# Patient Record
Sex: MALE | Race: BLACK OR AFRICAN AMERICAN | URBAN - METROPOLITAN AREA
[De-identification: names, ages, dates, MRNs, and addresses within clinical notes are randomized per-mention and may not be internally consistent; named-entity substitution may affect disease eponyms.]

---

## 2017-10-09 ENCOUNTER — EMERGENCY (EMERGENCY)
Facility: HOSPITAL | Age: 72
LOS: 1 days | End: 2017-10-09
Payer: MEDICARE

## 2017-10-09 PROCEDURE — 99284 EMERGENCY DEPT VISIT MOD MDM: CPT

## 2018-11-11 ENCOUNTER — EMERGENCY (EMERGENCY)
Facility: HOSPITAL | Age: 73
LOS: 1 days | End: 2018-11-11
Payer: MEDICARE

## 2018-11-11 PROCEDURE — 99284 EMERGENCY DEPT VISIT MOD MDM: CPT

## 2018-11-11 PROCEDURE — 72125 CT NECK SPINE W/O DYE: CPT | Mod: 26

## 2018-11-11 PROCEDURE — 71045 X-RAY EXAM CHEST 1 VIEW: CPT | Mod: 26

## 2018-11-11 PROCEDURE — 70450 CT HEAD/BRAIN W/O DYE: CPT | Mod: 26

## 2018-11-11 PROCEDURE — 72170 X-RAY EXAM OF PELVIS: CPT | Mod: 26

## 2019-08-10 ENCOUNTER — EMERGENCY (EMERGENCY)
Facility: HOSPITAL | Age: 74
LOS: 1 days | End: 2019-08-10
Admitting: EMERGENCY MEDICINE
Payer: MEDICARE

## 2019-08-10 PROCEDURE — 99283 EMERGENCY DEPT VISIT LOW MDM: CPT

## 2019-10-29 ENCOUNTER — EMERGENCY (EMERGENCY)
Facility: HOSPITAL | Age: 74
LOS: 1 days | End: 2019-10-29
Admitting: EMERGENCY MEDICINE
Payer: MEDICARE

## 2019-10-29 PROCEDURE — 99284 EMERGENCY DEPT VISIT MOD MDM: CPT

## 2019-10-29 PROCEDURE — 93010 ELECTROCARDIOGRAM REPORT: CPT

## 2020-01-05 ENCOUNTER — EMERGENCY (EMERGENCY)
Facility: HOSPITAL | Age: 75
LOS: 1 days | End: 2020-01-05
Admitting: EMERGENCY MEDICINE
Payer: MEDICARE

## 2020-01-05 PROCEDURE — 70450 CT HEAD/BRAIN W/O DYE: CPT | Mod: 26

## 2020-01-05 PROCEDURE — 99284 EMERGENCY DEPT VISIT MOD MDM: CPT

## 2020-01-24 ENCOUNTER — OUTPATIENT (OUTPATIENT)
Dept: OUTPATIENT SERVICES | Facility: HOSPITAL | Age: 75
LOS: 1 days | End: 2020-01-24

## 2020-01-24 ENCOUNTER — INPATIENT (INPATIENT)
Facility: HOSPITAL | Age: 75
LOS: 6 days | Discharge: HOME | End: 2020-01-31
Payer: MEDICARE

## 2020-01-24 PROCEDURE — 99285 EMERGENCY DEPT VISIT HI MDM: CPT

## 2020-01-24 PROCEDURE — 70450 CT HEAD/BRAIN W/O DYE: CPT | Mod: 26

## 2020-01-24 PROCEDURE — 71046 X-RAY EXAM CHEST 2 VIEWS: CPT | Mod: 26

## 2020-01-25 ENCOUNTER — OUTPATIENT (OUTPATIENT)
Dept: OUTPATIENT SERVICES | Facility: HOSPITAL | Age: 75
LOS: 1 days | End: 2020-01-25

## 2020-01-25 PROCEDURE — 76770 US EXAM ABDO BACK WALL COMP: CPT | Mod: 26

## 2020-01-25 PROCEDURE — 71250 CT THORAX DX C-: CPT | Mod: 26

## 2020-01-26 ENCOUNTER — OUTPATIENT (OUTPATIENT)
Dept: OUTPATIENT SERVICES | Facility: HOSPITAL | Age: 75
LOS: 1 days | End: 2020-01-26

## 2020-01-27 ENCOUNTER — OUTPATIENT (OUTPATIENT)
Dept: OUTPATIENT SERVICES | Facility: HOSPITAL | Age: 75
LOS: 1 days | End: 2020-01-27

## 2020-01-28 ENCOUNTER — OUTPATIENT (OUTPATIENT)
Dept: OUTPATIENT SERVICES | Facility: HOSPITAL | Age: 75
LOS: 1 days | End: 2020-01-28

## 2020-01-29 ENCOUNTER — OUTPATIENT (OUTPATIENT)
Dept: OUTPATIENT SERVICES | Facility: HOSPITAL | Age: 75
LOS: 1 days | End: 2020-01-29

## 2020-01-30 ENCOUNTER — OUTPATIENT (OUTPATIENT)
Dept: OUTPATIENT SERVICES | Facility: HOSPITAL | Age: 75
LOS: 1 days | End: 2020-01-30

## 2020-01-31 ENCOUNTER — OUTPATIENT (OUTPATIENT)
Dept: OUTPATIENT SERVICES | Facility: HOSPITAL | Age: 75
LOS: 1 days | End: 2020-01-31

## 2023-12-19 ENCOUNTER — INPATIENT (INPATIENT)
Facility: HOSPITAL | Age: 78
LOS: 8 days | Discharge: NURSING, REHAB OR LONG-TERM CARE | DRG: 682 | End: 2023-12-28
Attending: INTERNAL MEDICINE | Admitting: INTERNAL MEDICINE
Payer: MEDICARE

## 2023-12-19 VITALS
WEIGHT: 179.9 LBS | HEART RATE: 82 BPM | OXYGEN SATURATION: 100 % | HEIGHT: 69 IN | RESPIRATION RATE: 18 BRPM | SYSTOLIC BLOOD PRESSURE: 192 MMHG | DIASTOLIC BLOOD PRESSURE: 70 MMHG

## 2023-12-19 DIAGNOSIS — F20.9 SCHIZOPHRENIA, UNSPECIFIED: ICD-10-CM

## 2023-12-19 DIAGNOSIS — I10 ESSENTIAL (PRIMARY) HYPERTENSION: ICD-10-CM

## 2023-12-19 DIAGNOSIS — N19 UNSPECIFIED KIDNEY FAILURE: ICD-10-CM

## 2023-12-19 DIAGNOSIS — D64.9 ANEMIA, UNSPECIFIED: ICD-10-CM

## 2023-12-19 LAB
ALBUMIN SERPL ELPH-MCNC: 4.1 G/DL — SIGNIFICANT CHANGE UP (ref 3.3–5)
ALBUMIN SERPL ELPH-MCNC: 4.1 G/DL — SIGNIFICANT CHANGE UP (ref 3.3–5)
ALP SERPL-CCNC: 96 U/L — SIGNIFICANT CHANGE UP (ref 40–120)
ALP SERPL-CCNC: 96 U/L — SIGNIFICANT CHANGE UP (ref 40–120)
ALT FLD-CCNC: 11 U/L — SIGNIFICANT CHANGE UP (ref 10–45)
ALT FLD-CCNC: 11 U/L — SIGNIFICANT CHANGE UP (ref 10–45)
AMPHET UR-MCNC: NEGATIVE — SIGNIFICANT CHANGE UP
AMPHET UR-MCNC: NEGATIVE — SIGNIFICANT CHANGE UP
ANION GAP SERPL CALC-SCNC: 12 MMOL/L — SIGNIFICANT CHANGE UP (ref 5–17)
ANION GAP SERPL CALC-SCNC: 12 MMOL/L — SIGNIFICANT CHANGE UP (ref 5–17)
APAP SERPL-MCNC: <15 UG/ML — SIGNIFICANT CHANGE UP (ref 10–30)
APAP SERPL-MCNC: <15 UG/ML — SIGNIFICANT CHANGE UP (ref 10–30)
APPEARANCE UR: CLEAR — SIGNIFICANT CHANGE UP
APPEARANCE UR: CLEAR — SIGNIFICANT CHANGE UP
AST SERPL-CCNC: 21 U/L — SIGNIFICANT CHANGE UP (ref 10–40)
AST SERPL-CCNC: 21 U/L — SIGNIFICANT CHANGE UP (ref 10–40)
BACTERIA # UR AUTO: NEGATIVE /HPF — SIGNIFICANT CHANGE UP
BACTERIA # UR AUTO: NEGATIVE /HPF — SIGNIFICANT CHANGE UP
BARBITURATES UR SCN-MCNC: NEGATIVE — SIGNIFICANT CHANGE UP
BARBITURATES UR SCN-MCNC: NEGATIVE — SIGNIFICANT CHANGE UP
BASOPHILS # BLD AUTO: 0.02 K/UL — SIGNIFICANT CHANGE UP (ref 0–0.2)
BASOPHILS # BLD AUTO: 0.02 K/UL — SIGNIFICANT CHANGE UP (ref 0–0.2)
BASOPHILS NFR BLD AUTO: 0.5 % — SIGNIFICANT CHANGE UP (ref 0–2)
BASOPHILS NFR BLD AUTO: 0.5 % — SIGNIFICANT CHANGE UP (ref 0–2)
BENZODIAZ UR-MCNC: NEGATIVE — SIGNIFICANT CHANGE UP
BENZODIAZ UR-MCNC: NEGATIVE — SIGNIFICANT CHANGE UP
BILIRUB SERPL-MCNC: 0.2 MG/DL — SIGNIFICANT CHANGE UP (ref 0.2–1.2)
BILIRUB SERPL-MCNC: 0.2 MG/DL — SIGNIFICANT CHANGE UP (ref 0.2–1.2)
BILIRUB UR-MCNC: NEGATIVE — SIGNIFICANT CHANGE UP
BILIRUB UR-MCNC: NEGATIVE — SIGNIFICANT CHANGE UP
BUN SERPL-MCNC: 75 MG/DL — HIGH (ref 7–23)
BUN SERPL-MCNC: 75 MG/DL — HIGH (ref 7–23)
CALCIUM SERPL-MCNC: 8.8 MG/DL — SIGNIFICANT CHANGE UP (ref 8.4–10.5)
CALCIUM SERPL-MCNC: 8.8 MG/DL — SIGNIFICANT CHANGE UP (ref 8.4–10.5)
CAST: 0 /LPF — SIGNIFICANT CHANGE UP (ref 0–4)
CAST: 0 /LPF — SIGNIFICANT CHANGE UP (ref 0–4)
CHLORIDE SERPL-SCNC: 105 MMOL/L — SIGNIFICANT CHANGE UP (ref 96–108)
CHLORIDE SERPL-SCNC: 105 MMOL/L — SIGNIFICANT CHANGE UP (ref 96–108)
CO2 SERPL-SCNC: 22 MMOL/L — SIGNIFICANT CHANGE UP (ref 22–31)
CO2 SERPL-SCNC: 22 MMOL/L — SIGNIFICANT CHANGE UP (ref 22–31)
COCAINE METAB.OTHER UR-MCNC: NEGATIVE — SIGNIFICANT CHANGE UP
COCAINE METAB.OTHER UR-MCNC: NEGATIVE — SIGNIFICANT CHANGE UP
COLOR SPEC: YELLOW — SIGNIFICANT CHANGE UP
COLOR SPEC: YELLOW — SIGNIFICANT CHANGE UP
CREAT SERPL-MCNC: 4.46 MG/DL — HIGH (ref 0.5–1.3)
CREAT SERPL-MCNC: 4.46 MG/DL — HIGH (ref 0.5–1.3)
DIFF PNL FLD: ABNORMAL
DIFF PNL FLD: ABNORMAL
EGFR: 13 ML/MIN/1.73M2 — LOW
EGFR: 13 ML/MIN/1.73M2 — LOW
EOSINOPHIL # BLD AUTO: 0.04 K/UL — SIGNIFICANT CHANGE UP (ref 0–0.5)
EOSINOPHIL # BLD AUTO: 0.04 K/UL — SIGNIFICANT CHANGE UP (ref 0–0.5)
EOSINOPHIL NFR BLD AUTO: 1 % — SIGNIFICANT CHANGE UP (ref 0–6)
EOSINOPHIL NFR BLD AUTO: 1 % — SIGNIFICANT CHANGE UP (ref 0–6)
ETHANOL SERPL-MCNC: <10 MG/DL — SIGNIFICANT CHANGE UP (ref 0–10)
ETHANOL SERPL-MCNC: <10 MG/DL — SIGNIFICANT CHANGE UP (ref 0–10)
GLUCOSE BLDC GLUCOMTR-MCNC: 167 MG/DL — HIGH (ref 70–99)
GLUCOSE BLDC GLUCOMTR-MCNC: 167 MG/DL — HIGH (ref 70–99)
GLUCOSE SERPL-MCNC: 59 MG/DL — LOW (ref 70–99)
GLUCOSE SERPL-MCNC: 59 MG/DL — LOW (ref 70–99)
GLUCOSE UR QL: NEGATIVE MG/DL — SIGNIFICANT CHANGE UP
GLUCOSE UR QL: NEGATIVE MG/DL — SIGNIFICANT CHANGE UP
HCT VFR BLD CALC: 29.4 % — LOW (ref 39–50)
HCT VFR BLD CALC: 29.4 % — LOW (ref 39–50)
HGB BLD-MCNC: 9.6 G/DL — LOW (ref 13–17)
HGB BLD-MCNC: 9.6 G/DL — LOW (ref 13–17)
IMM GRANULOCYTES NFR BLD AUTO: 0.5 % — SIGNIFICANT CHANGE UP (ref 0–0.9)
IMM GRANULOCYTES NFR BLD AUTO: 0.5 % — SIGNIFICANT CHANGE UP (ref 0–0.9)
KETONES UR-MCNC: NEGATIVE MG/DL — SIGNIFICANT CHANGE UP
KETONES UR-MCNC: NEGATIVE MG/DL — SIGNIFICANT CHANGE UP
LEUKOCYTE ESTERASE UR-ACNC: NEGATIVE — SIGNIFICANT CHANGE UP
LEUKOCYTE ESTERASE UR-ACNC: NEGATIVE — SIGNIFICANT CHANGE UP
LYMPHOCYTES # BLD AUTO: 1.17 K/UL — SIGNIFICANT CHANGE UP (ref 1–3.3)
LYMPHOCYTES # BLD AUTO: 1.17 K/UL — SIGNIFICANT CHANGE UP (ref 1–3.3)
LYMPHOCYTES # BLD AUTO: 28.6 % — SIGNIFICANT CHANGE UP (ref 13–44)
LYMPHOCYTES # BLD AUTO: 28.6 % — SIGNIFICANT CHANGE UP (ref 13–44)
MCHC RBC-ENTMCNC: 28.9 PG — SIGNIFICANT CHANGE UP (ref 27–34)
MCHC RBC-ENTMCNC: 28.9 PG — SIGNIFICANT CHANGE UP (ref 27–34)
MCHC RBC-ENTMCNC: 32.7 GM/DL — SIGNIFICANT CHANGE UP (ref 32–36)
MCHC RBC-ENTMCNC: 32.7 GM/DL — SIGNIFICANT CHANGE UP (ref 32–36)
MCV RBC AUTO: 88.6 FL — SIGNIFICANT CHANGE UP (ref 80–100)
MCV RBC AUTO: 88.6 FL — SIGNIFICANT CHANGE UP (ref 80–100)
METHADONE UR-MCNC: NEGATIVE — SIGNIFICANT CHANGE UP
METHADONE UR-MCNC: NEGATIVE — SIGNIFICANT CHANGE UP
MONOCYTES # BLD AUTO: 0.42 K/UL — SIGNIFICANT CHANGE UP (ref 0–0.9)
MONOCYTES # BLD AUTO: 0.42 K/UL — SIGNIFICANT CHANGE UP (ref 0–0.9)
MONOCYTES NFR BLD AUTO: 10.3 % — SIGNIFICANT CHANGE UP (ref 2–14)
MONOCYTES NFR BLD AUTO: 10.3 % — SIGNIFICANT CHANGE UP (ref 2–14)
NEUTROPHILS # BLD AUTO: 2.42 K/UL — SIGNIFICANT CHANGE UP (ref 1.8–7.4)
NEUTROPHILS # BLD AUTO: 2.42 K/UL — SIGNIFICANT CHANGE UP (ref 1.8–7.4)
NEUTROPHILS NFR BLD AUTO: 59.1 % — SIGNIFICANT CHANGE UP (ref 43–77)
NEUTROPHILS NFR BLD AUTO: 59.1 % — SIGNIFICANT CHANGE UP (ref 43–77)
NITRITE UR-MCNC: NEGATIVE — SIGNIFICANT CHANGE UP
NITRITE UR-MCNC: NEGATIVE — SIGNIFICANT CHANGE UP
NRBC # BLD: 0 /100 WBCS — SIGNIFICANT CHANGE UP (ref 0–0)
NRBC # BLD: 0 /100 WBCS — SIGNIFICANT CHANGE UP (ref 0–0)
OPIATES UR-MCNC: NEGATIVE — SIGNIFICANT CHANGE UP
OPIATES UR-MCNC: NEGATIVE — SIGNIFICANT CHANGE UP
OXYCODONE UR-MCNC: NEGATIVE — SIGNIFICANT CHANGE UP
OXYCODONE UR-MCNC: NEGATIVE — SIGNIFICANT CHANGE UP
PCP SPEC-MCNC: SIGNIFICANT CHANGE UP
PCP SPEC-MCNC: SIGNIFICANT CHANGE UP
PCP UR-MCNC: NEGATIVE — SIGNIFICANT CHANGE UP
PCP UR-MCNC: NEGATIVE — SIGNIFICANT CHANGE UP
PH UR: 6.5 — SIGNIFICANT CHANGE UP (ref 5–8)
PH UR: 6.5 — SIGNIFICANT CHANGE UP (ref 5–8)
PLATELET # BLD AUTO: 159 K/UL — SIGNIFICANT CHANGE UP (ref 150–400)
PLATELET # BLD AUTO: 159 K/UL — SIGNIFICANT CHANGE UP (ref 150–400)
POTASSIUM SERPL-MCNC: 4.4 MMOL/L — SIGNIFICANT CHANGE UP (ref 3.5–5.3)
POTASSIUM SERPL-MCNC: 4.4 MMOL/L — SIGNIFICANT CHANGE UP (ref 3.5–5.3)
POTASSIUM SERPL-SCNC: 4.4 MMOL/L — SIGNIFICANT CHANGE UP (ref 3.5–5.3)
POTASSIUM SERPL-SCNC: 4.4 MMOL/L — SIGNIFICANT CHANGE UP (ref 3.5–5.3)
PROT SERPL-MCNC: 7.2 G/DL — SIGNIFICANT CHANGE UP (ref 6–8.3)
PROT SERPL-MCNC: 7.2 G/DL — SIGNIFICANT CHANGE UP (ref 6–8.3)
PROT UR-MCNC: 30 MG/DL
PROT UR-MCNC: 30 MG/DL
RBC # BLD: 3.32 M/UL — LOW (ref 4.2–5.8)
RBC # BLD: 3.32 M/UL — LOW (ref 4.2–5.8)
RBC # FLD: 14.5 % — SIGNIFICANT CHANGE UP (ref 10.3–14.5)
RBC # FLD: 14.5 % — SIGNIFICANT CHANGE UP (ref 10.3–14.5)
RBC CASTS # UR COMP ASSIST: 0 /HPF — SIGNIFICANT CHANGE UP (ref 0–4)
RBC CASTS # UR COMP ASSIST: 0 /HPF — SIGNIFICANT CHANGE UP (ref 0–4)
SALICYLATES SERPL-MCNC: <2 MG/DL — LOW (ref 15–30)
SALICYLATES SERPL-MCNC: <2 MG/DL — LOW (ref 15–30)
SODIUM SERPL-SCNC: 139 MMOL/L — SIGNIFICANT CHANGE UP (ref 135–145)
SODIUM SERPL-SCNC: 139 MMOL/L — SIGNIFICANT CHANGE UP (ref 135–145)
SP GR SPEC: 1.01 — SIGNIFICANT CHANGE UP (ref 1–1.03)
SP GR SPEC: 1.01 — SIGNIFICANT CHANGE UP (ref 1–1.03)
SQUAMOUS # UR AUTO: 0 /HPF — SIGNIFICANT CHANGE UP (ref 0–5)
SQUAMOUS # UR AUTO: 0 /HPF — SIGNIFICANT CHANGE UP (ref 0–5)
THC UR QL: NEGATIVE — SIGNIFICANT CHANGE UP
THC UR QL: NEGATIVE — SIGNIFICANT CHANGE UP
UROBILINOGEN FLD QL: 0.2 MG/DL — SIGNIFICANT CHANGE UP (ref 0.2–1)
UROBILINOGEN FLD QL: 0.2 MG/DL — SIGNIFICANT CHANGE UP (ref 0.2–1)
VALPROATE SERPL-MCNC: 39 UG/ML — LOW (ref 50–100)
VALPROATE SERPL-MCNC: 39 UG/ML — LOW (ref 50–100)
WBC # BLD: 4.09 K/UL — SIGNIFICANT CHANGE UP (ref 3.8–10.5)
WBC # BLD: 4.09 K/UL — SIGNIFICANT CHANGE UP (ref 3.8–10.5)
WBC # FLD AUTO: 4.09 K/UL — SIGNIFICANT CHANGE UP (ref 3.8–10.5)
WBC # FLD AUTO: 4.09 K/UL — SIGNIFICANT CHANGE UP (ref 3.8–10.5)
WBC UR QL: 1 /HPF — SIGNIFICANT CHANGE UP (ref 0–5)
WBC UR QL: 1 /HPF — SIGNIFICANT CHANGE UP (ref 0–5)

## 2023-12-19 PROCEDURE — 99222 1ST HOSP IP/OBS MODERATE 55: CPT

## 2023-12-19 PROCEDURE — 99285 EMERGENCY DEPT VISIT HI MDM: CPT

## 2023-12-19 PROCEDURE — 76770 US EXAM ABDO BACK WALL COMP: CPT | Mod: 26

## 2023-12-19 RX ORDER — DEXTROSE 50 % IN WATER 50 %
12.5 SYRINGE (ML) INTRAVENOUS ONCE
Refills: 0 | Status: DISCONTINUED | OUTPATIENT
Start: 2023-12-19 | End: 2023-12-28

## 2023-12-19 RX ORDER — HYDRALAZINE HCL 50 MG
25 TABLET ORAL EVERY 8 HOURS
Refills: 0 | Status: DISCONTINUED | OUTPATIENT
Start: 2023-12-19 | End: 2023-12-28

## 2023-12-19 RX ORDER — ISOSORBIDE MONONITRATE 60 MG/1
30 TABLET, EXTENDED RELEASE ORAL DAILY
Refills: 0 | Status: DISCONTINUED | OUTPATIENT
Start: 2023-12-19 | End: 2023-12-28

## 2023-12-19 RX ORDER — GLUCAGON INJECTION, SOLUTION 0.5 MG/.1ML
1 INJECTION, SOLUTION SUBCUTANEOUS ONCE
Refills: 0 | Status: DISCONTINUED | OUTPATIENT
Start: 2023-12-19 | End: 2023-12-28

## 2023-12-19 RX ORDER — SODIUM CHLORIDE 9 MG/ML
1000 INJECTION, SOLUTION INTRAVENOUS
Refills: 0 | Status: DISCONTINUED | OUTPATIENT
Start: 2023-12-19 | End: 2023-12-28

## 2023-12-19 RX ORDER — INSULIN LISPRO 100/ML
VIAL (ML) SUBCUTANEOUS
Refills: 0 | Status: DISCONTINUED | OUTPATIENT
Start: 2023-12-19 | End: 2023-12-28

## 2023-12-19 RX ORDER — TRAZODONE HCL 50 MG
100 TABLET ORAL AT BEDTIME
Refills: 0 | Status: DISCONTINUED | OUTPATIENT
Start: 2023-12-19 | End: 2023-12-28

## 2023-12-19 RX ORDER — INSULIN GLARGINE 100 [IU]/ML
8 INJECTION, SOLUTION SUBCUTANEOUS AT BEDTIME
Refills: 0 | Status: DISCONTINUED | OUTPATIENT
Start: 2023-12-19 | End: 2023-12-28

## 2023-12-19 RX ORDER — HYDRALAZINE HCL 50 MG
25 TABLET ORAL ONCE
Refills: 0 | Status: COMPLETED | OUTPATIENT
Start: 2023-12-19 | End: 2023-12-19

## 2023-12-19 RX ORDER — DEXTROSE 50 % IN WATER 50 %
25 SYRINGE (ML) INTRAVENOUS ONCE
Refills: 0 | Status: DISCONTINUED | OUTPATIENT
Start: 2023-12-19 | End: 2023-12-28

## 2023-12-19 RX ORDER — CLOPIDOGREL BISULFATE 75 MG/1
75 TABLET, FILM COATED ORAL DAILY
Refills: 0 | Status: DISCONTINUED | OUTPATIENT
Start: 2023-12-19 | End: 2023-12-28

## 2023-12-19 RX ORDER — TAMSULOSIN HYDROCHLORIDE 0.4 MG/1
0.4 CAPSULE ORAL AT BEDTIME
Refills: 0 | Status: DISCONTINUED | OUTPATIENT
Start: 2023-12-19 | End: 2023-12-28

## 2023-12-19 RX ORDER — FINASTERIDE 5 MG/1
5 TABLET, FILM COATED ORAL DAILY
Refills: 0 | Status: DISCONTINUED | OUTPATIENT
Start: 2023-12-19 | End: 2023-12-28

## 2023-12-19 RX ORDER — METOPROLOL TARTRATE 50 MG
100 TABLET ORAL DAILY
Refills: 0 | Status: DISCONTINUED | OUTPATIENT
Start: 2023-12-19 | End: 2023-12-28

## 2023-12-19 RX ORDER — DIVALPROEX SODIUM 500 MG/1
500 TABLET, DELAYED RELEASE ORAL
Refills: 0 | Status: DISCONTINUED | OUTPATIENT
Start: 2023-12-19 | End: 2023-12-28

## 2023-12-19 RX ORDER — DEXTROSE 50 % IN WATER 50 %
15 SYRINGE (ML) INTRAVENOUS ONCE
Refills: 0 | Status: DISCONTINUED | OUTPATIENT
Start: 2023-12-19 | End: 2023-12-28

## 2023-12-19 RX ADMIN — TAMSULOSIN HYDROCHLORIDE 0.4 MILLIGRAM(S): 0.4 CAPSULE ORAL at 22:18

## 2023-12-19 RX ADMIN — INSULIN GLARGINE 8 UNIT(S): 100 INJECTION, SOLUTION SUBCUTANEOUS at 22:17

## 2023-12-19 RX ADMIN — Medication 1: at 22:17

## 2023-12-19 RX ADMIN — Medication 100 MILLIGRAM(S): at 22:18

## 2023-12-19 RX ADMIN — Medication 25 MILLIGRAM(S): at 20:17

## 2023-12-19 RX ADMIN — Medication 1 MILLIGRAM(S): at 18:46

## 2023-12-19 RX ADMIN — Medication 25 MILLIGRAM(S): at 22:18

## 2023-12-19 NOTE — ED PROVIDER NOTE - CLINICAL SUMMARY MEDICAL DECISION MAKING FREE TEXT BOX
elderly male with prior psych history presents due to being an apparent threat to others. denies somatic complaints or recent trauma. no focal neuro deficit. VS with htn, otherwise wnl on arrival. screening ecg for QTC, lytes, tsh, drug screen, psych cs.

## 2023-12-19 NOTE — CONSULT NOTE ADULT - ATTENDING COMMENTS
Patient seen and evaluated this morning.  patient not cooperative with examination offers no history to me.  spoke with primary team who got collateral sounds like patient has known advanced renal dysfunction and was being worked up for dialysis.  at this moment there are no emergency indications for HD will monitor - continue to gather collateral and goals of care.
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
## 2023-12-19 NOTE — CONSULT NOTE ADULT - PROBLEM SELECTOR RECOMMENDATION 2
Pt with -190s in the ED. Pt on Hydralazine 25mg TID, Imdur 30mg QD, and Metoprolol tartrate 100mg QD. Recommend resuming home BP medications. Titrate medications for goal SBP <140.

## 2023-12-19 NOTE — H&P ADULT - ASSESSMENT
78 male h/o schizophrenia, suspected ESRD on HD, sent in for agitation and combativeness    schizophrenia  psy audra noted  resume home meds    suspected esrd on hd  renal consult f/u  pt has reportedly been refusing HD  unable to verify history as pt confused, and no one available to provide info from the nursing home  will need to clarify in am    htn  resume home meds    anemia  likely chronic  monitor    will need to clarify home meds             Advanced care planning was discussed with patient and family.  Advanced care planning forms were reviewed and discussed as appropriate.  Differential diagnosis and plan of care discussed with patient after the evaluation.   Pain assessed and judicious use of narcotics when appropriate was discussed.  Importance of Fall prevention discussed.  Counseling on Smoking and Alcohol cessation was offered when appropriate.  Counseling on Diet, exercise, and medication compliance was done.       Approx 75 minutes spent.
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
## 2023-12-19 NOTE — ED ADULT NURSE REASSESSMENT NOTE - DESCRIPTION
pt received ativan 1 mg po at 1846H for agitation pt received ativan 1 mg po at 1846H for agitation, Pt is being admitted to medicine
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
## 2023-12-19 NOTE — ED PROVIDER NOTE - ATTENDING CONTRIBUTION TO CARE
I, Kamran Milan, have performed a history and physical exam on this patient, and discussed their management with the resident. I have fully participated in the care of this patient. I agree with the history, physical exam, and plan as documented by the resident

## 2023-12-19 NOTE — ED ADULT NURSE REASSESSMENT NOTE - NS ED NURSE REASSESS COMMENT FT1
ACP Chambers contacted about pt elevated BP. Pt asymptomatic at this time. Pt pending med orders at this time.

## 2023-12-19 NOTE — CONSULT NOTE ADULT - PROBLEM SELECTOR RECOMMENDATION 9
Pt with HAYLEY in the setting of uncontrolled hypertension and BPH. On review of labs on [place], no prior labs available. Admission Scr was 4.46 with eGFR of 13 on 12/19/23. Attempted but unable to reach sister Enedina Keyes for further history ([phone]). Length of HAYLEY unknown. Pt with B/L LE edema on exam. Pt on finasteride and tamsulosin. Pt clinically stable. UA with 30mg of protein, SG 1.009, and trace blood. No UOP documented but urinating frequently in ED. Urine drug screen negative. Recommend checking urine lytes, UPCR, BNP, and to obtain kidney US. Bladder scan to evaluate for urinary retention. Recommend IV Lasix 40mg x1 if no evidence of urinary retention. If pt has urinary retention place houser catheter. Place on renal diet. Will assess HD needs daily. No HD at this time. Monitor labs and urine output. Avoid nephrotoxins. Dose medications as per eGFR. Pt with HAYLEY in the setting of uncontrolled hypertension and BPH. On review of labs on [place], no prior labs available. Admission Scr was 4.46 with eGFR of 13 on 12/19/23. Attempted but unable to reach sister Enedina Keyes for further history ([phone]). Length of HAYLEY unknown. Pt with B/L LE edema on exam. Pt on finasteride and tamsulosin. Pt clinically stable. UA with 30mg of protein, SG 1.009, and trace blood. No UOP documented but urinating frequently in ED. Urine drug screen negative. Recommend checking urine lytes, UPCR, BNP, and to obtain kidney US. Bladder scan to evaluate for urinary retention. Recommend IV Lasix 40mg x1 if no evidence of urinary retention. If pt has urinary retention place houser catheter. Place on renal diet. Will assess HD needs daily. No HD at this time. Monitor labs and urine output. Avoid nephrotoxins. Dose medications as per eGFR.

## 2023-12-19 NOTE — ED ADULT NURSE NOTE - NSFALLHARMRISKINTERV_ED_ALL_ED
Assistance OOB with selected safe patient handling equipment if applicable/Communicate risk of Fall with Harm to all staff, patient, and family/Monitor gait and stability/Provide patient with walking aids/Provide visual cue: red socks, yellow wristband, yellow gown, etc/Reinforce activity limits and safety measures with patient and family/Bed in lowest position, wheels locked, appropriate side rails in place/Call bell, personal items and telephone in reach/Instruct patient to call for assistance before getting out of bed/chair/stretcher/Non-slip footwear applied when patient is off stretcher/[place] to call system/Physically safe environment - no spills, clutter or unnecessary equipment/Purposeful Proactive Rounding/Room/bathroom lighting operational, light cord in reach Assistance OOB with selected safe patient handling equipment if applicable/Communicate risk of Fall with Harm to all staff, patient, and family/Monitor gait and stability/Provide patient with walking aids/Provide visual cue: red socks, yellow wristband, yellow gown, etc/Reinforce activity limits and safety measures with patient and family/Bed in lowest position, wheels locked, appropriate side rails in place/Call bell, personal items and telephone in reach/Instruct patient to call for assistance before getting out of bed/chair/stretcher/Non-slip footwear applied when patient is off stretcher/[place] to call system/Physically safe environment - no spills, clutter or unnecessary equipment/Purposeful Proactive Rounding/Room/bathroom lighting operational, light cord in reach

## 2023-12-19 NOTE — ED PROVIDER NOTE - PHYSICAL EXAMINATION
General: non-toxic, NAD  HEENT: NCAT, PERRL  Cardiac: RRR, no murmurs, 2+ peripheral pulses  Resp: CTAB  Extremities: no peripheral edema, calf tenderness, or leg size discrepancies  Skin: no visible rashes  Neuro: AAOx4, 5+motor, sensation grossly intact  Psych: speaks loudly. mildly agitated intermittently refusing blood work. no tangential speech. mildly pressured speech.

## 2023-12-19 NOTE — ED BEHAVIORAL HEALTH ASSESSMENT NOTE - CURRENT PASSIVE IDEATION:
- Pain well controlled, continue current pain regimen  - Increase ambulation, SCDs when not ambulating  - Continue regular diet  - AM H/H    Robyn Frankel PGY-1 None known
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
## 2023-12-19 NOTE — CHART NOTE - NSCHARTNOTEFT_GEN_A_CORE
Emergency Room : Patient is a 78 year old single  male presented to the ED for evaluation for aggressive behavior. Patient is wearing 5 hats on his head and speaking loudly.  Per chart review patient has history of dementia and Schizoaffective Disorder. Patient is resident of [facility].  LMSW introduced herself to the patient. Patient speaks loudly.  He informed he has been at the nursing home for years.  When asked about the aggressive behavior towards other residents and staff members patient denied. Presently patient is calmed and socializing with staff appropriately.    MARYSW contacted the facility staff at [facility] ([phone]) for collateral information.  Nursing Supervisor, Jennifer the patient was sent to the ED to be evaluated for abnormal labs (elevated Creatine). Nurse explained the patient has been increasingly confused and aggressive which they believe is contributed due to the abnormal lab results.  MARYSW met with the ED medical team and provided an update. Patient is pending further testing. Disposition is pending. Emergency Room : Patient is a 78 year old single  male presented to the ED for evaluation for aggressive behavior. Patient is wearing 5 hats on his head and speaking loudly.  Per chart review patient has history of dementia and Schizoaffective Disorder. Patient is resident of [facility].  LMSW introduced herself to the patient. Patient speaks loudly.  He informed he has been at the nursing home for years.  When asked about the aggressive behavior towards other residents and staff members patient denied. Presently patient is calmed and socializing with staff appropriately.    MARYSW contacted the facility staff at [facility] ([phone]) for collateral information.  Nursing Supervisor, Jennifer the patient was sent to the ED to be evaluated for abnormal labs (elevated Creatine). Nurse explained the patient has been increasingly confused and aggressive which they believe is contributed due to the abnormal lab results.  MARYSW met with the ED medical team and provided an update. Patient is pending further testing. Disposition is pending.

## 2023-12-19 NOTE — H&P ADULT - NSHPPHYSICALEXAM_GEN_ALL_CORE
Vital Signs Last 24 Hrs  T(C): 36.8 (19 Dec 2023 20:45), Max: 36.8 (19 Dec 2023 20:45)  T(F): 98.2 (19 Dec 2023 20:45), Max: 98.2 (19 Dec 2023 20:45)  HR: 82 (19 Dec 2023 20:45) (77 - 96)  BP: 185/84 (19 Dec 2023 20:45) (185/84 - 196/101)  BP(mean): --  RR: 18 (19 Dec 2023 20:45) (16 - 18)  SpO2: 99% (19 Dec 2023 20:45) (97% - 100%)    Parameters below as of 19 Dec 2023 20:45  Patient On (Oxygen Delivery Method): room air      PHYSICAL EXAM:  GENERAL: NAD, well-developed  HEAD:  Atraumatic, Normocephalic  EYES: EOMI, PERRLA, conjunctiva and sclera clear  NECK: Supple, No JVD  CHEST/LUNG: Clear to auscultation bilaterally; No wheeze  HEART: Regular rate and rhythm; No murmurs, rubs, or gallops  ABDOMEN: Soft, Nontender, Nondistended; Bowel sounds present  EXTREMITIES:  2+ Peripheral Pulses, No clubbing, cyanosis, or edema  PSYCH: AAOx3  NEUROLOGY: non-focal  SKIN: No rashes or lesions

## 2023-12-19 NOTE — CONSULT NOTE ADULT - SUBJECTIVE AND OBJECTIVE BOX
[facility] DIVISION OF KIDNEY DISEASES AND HYPERTENSION -- [phone]  -- INITIAL CONSULT NOTE  --------------------------------------------------------------------------------  HPI: 78M with PMH of HTN, HLD, Afib, BPH, COVID-19, dementia, insomnia, anemia, chronic viral hepatitis, MDD, and schizoaffective disorder who was sent in from [facility] for aggressive behavior and agitation. Nephrology Service consulted for HAYLEY. Of note, pt is poor historian therefore majority of history obtained from paperwork from Nursing Home paperwork.     On review of labs on [place], no prior labs available. Admission Scr was 4.46 with eGFR of 13 on 12/19/23.     Pt seen and evaluated in the ED. Pt reports feeling well and offers no complaints at this time. Denies HA, fevers/chills, CP, SOB, abdominal pain, urinary symptoms, n/v, diarrhea/constipation.     PAST HISTORY  --------------------------------------------------------------------------------  PAST MEDICAL & SURGICAL HISTORY:  As per HPI    FAMILY HISTORY:  Sister with kidney disease    PAST SOCIAL HISTORY:  No history of smoking    ALLERGIES & MEDICATIONS  --------------------------------------------------------------------------------  Allergies  Allergy Status Unknown    Intolerances    Standing Inpatient Medications  PRN Inpatient Medications  REVIEW OF SYSTEMS  --------------------------------------------------------------------------------  Gen: No fevers/chills  Skin: No rashes  Head/Eyes/Ears: No HA  Respiratory: No dyspnea, cough  CV: No chest pain  GI: No abdominal pain, diarrhea/constipation,   : No dysuria, hematuria  MSK: No edema    All other systems were reviewed and are negative, except as noted.    VITALS/PHYSICAL EXAM  --------------------------------------------------------------------------------  T(C): 36.7 (12-19-23 @ 18:30), Max: 36.7 (12-19-23 @ 18:30)  HR: 96 (12-19-23 @ 18:30) (82 - 96)  BP: 185/135 (12-19-23 @ 18:30) (185/135 - 192/70)  RR: 16 (12-19-23 @ 18:30) (16 - 18)  SpO2: 100% (12-19-23 @ 13:29) (100% - 100%)  Wt(kg): --  Height (cm): 175.3 (12-19-23 @ 13:29)  Weight (kg): 81.6 (12-19-23 @ 13:29)  BMI (kg/m2): 26.6 (12-19-23 @ 13:29)  BSA (m2): 1.98 (12-19-23 @ 13:29)    Physical Exam:  Gen: NAD, eating dinner  HEENT: MMM  Pulm: CTA B/L  CV: S1S2  Abd: Soft, +BS, non-tender to palpation  Ext: B/L LE edema  Neuro: Awake, confused but answers most questions appropriately  Skin: Warm and dry    LABS/STUDIES  --------------------------------------------------------------------------------              9.6    4.09  >-----------<  159      [12-19-23 @ 15:21]              29.4     139  |  105  |  75  ----------------------------<  59      [12-19-23 @ 15:21]  4.4   |  22  |  4.46        Ca     8.8     [12-19-23 @ 15:21]    TPro  7.2  /  Alb  4.1  /  TBili  0.2  /  DBili  x   /  AST  21  /  ALT  11  /  AlkPhos  96  [12-19-23 @ 15:21]    Creatinine Trend:  SCr 4.46 [12-19 @ 15:21] [facility] DIVISION OF KIDNEY DISEASES AND HYPERTENSION -- [phone]  -- INITIAL CONSULT NOTE  --------------------------------------------------------------------------------  HPI: 78M with PMH of HTN, HLD, Afib, BPH, COVID-19, dementia, insomnia, anemia, chronic viral hepatitis, MDD, and schizoaffective disorder who was sent in from [facility] for aggressive behavior and agitation. Nephrology Service consulted for HAYLEY. Of note, pt is poor historian therefore majority of history obtained from paperwork from Nursing Home paperwork.     On review of labs on [place], no prior labs available. Admission Scr was 4.46 with eGFR of 13 on 12/19/23.     Pt seen and evaluated in the ED. Pt reports feeling well and offers no complaints at this time. Denies HA, fevers/chills, CP, SOB, abdominal pain, urinary symptoms, n/v, diarrhea/constipation.     PAST HISTORY  --------------------------------------------------------------------------------  PAST MEDICAL & SURGICAL HISTORY:  As per HPI    FAMILY HISTORY:  Sister with kidney disease    PAST SOCIAL HISTORY:  No history of smoking    ALLERGIES & MEDICATIONS  --------------------------------------------------------------------------------  Allergies  Allergy Status Unknown    Intolerances    Standing Inpatient Medications  PRN Inpatient Medications  REVIEW OF SYSTEMS  --------------------------------------------------------------------------------  Gen: No fevers/chills  Skin: No rashes  Head/Eyes/Ears: No HA  Respiratory: No dyspnea, cough  CV: No chest pain  GI: No abdominal pain, diarrhea/constipation,   : No dysuria, hematuria  MSK: No edema    All other systems were reviewed and are negative, except as noted.    VITALS/PHYSICAL EXAM  --------------------------------------------------------------------------------  T(C): 36.7 (12-19-23 @ 18:30), Max: 36.7 (12-19-23 @ 18:30)  HR: 96 (12-19-23 @ 18:30) (82 - 96)  BP: 185/135 (12-19-23 @ 18:30) (185/135 - 192/70)  RR: 16 (12-19-23 @ 18:30) (16 - 18)  SpO2: 100% (12-19-23 @ 13:29) (100% - 100%)  Wt(kg): --  Height (cm): 175.3 (12-19-23 @ 13:29)  Weight (kg): 81.6 (12-19-23 @ 13:29)  BMI (kg/m2): 26.6 (12-19-23 @ 13:29)  BSA (m2): 1.98 (12-19-23 @ 13:29)    Physical Exam:  Gen: NAD, eating dinner  HEENT: MMM  Pulm: CTA B/L  CV: S1S2  Abd: Soft, +BS, non-tender to palpation  Ext: B/L LE edema  Neuro: Awake, confused but answers most questions appropriately  Skin: Warm and dry    LABS/STUDIES  --------------------------------------------------------------------------------              9.6    4.09  >-----------<  159      [12-19-23 @ 15:21]              29.4     139  |  105  |  75  ----------------------------<  59      [12-19-23 @ 15:21]  4.4   |  22  |  4.46        Ca     8.8     [12-19-23 @ 15:21]    TPro  7.2  /  Alb  4.1  /  TBili  0.2  /  DBili  x   /  AST  21  /  ALT  11  /  AlkPhos  96  [12-19-23 @ 15:21]    Creatinine Trend:  SCr 4.46 [12-19 @ 15:21] [facility] DIVISION OF KIDNEY DISEASES AND HYPERTENSION -- [phone]  -- INITIAL CONSULT NOTE  --------------------------------------------------------------------------------    HPI: 78M with PMH of HTN, HLD, Afib, BPH, COVID-19, dementia, insomnia, anemia, chronic viral hepatitis, MDD, and schizoaffective disorder who was sent in from [facility] for aggressive behavior and agitation. Nephrology Service consulted for HAYLEY. Of note, pt is poor historian therefore majority of history obtained from paperwork from Nursing Home paperwork.     On review of labs on [place], no prior labs available. Admission Scr was 4.46 with eGFR of 13 on 12/19/23.     Pt seen and evaluated in the ED. Pt reports feeling well and offers no complaints at this time. Denies HA, fevers/chills, CP, SOB, abdominal pain, urinary symptoms, n/v, diarrhea/constipation.     PAST HISTORY  --------------------------------------------------------------------------------  PAST MEDICAL & SURGICAL HISTORY:  As per HPI    FAMILY HISTORY:  Sister with kidney disease    PAST SOCIAL HISTORY:  No history of smoking    ALLERGIES & MEDICATIONS  --------------------------------------------------------------------------------  Allergies  Allergy Status Unknown    Intolerances    Standing Inpatient Medications  PRN Inpatient Medications  REVIEW OF SYSTEMS  --------------------------------------------------------------------------------  Gen: No fevers/chills  Skin: No rashes  Head/Eyes/Ears: No HA  Respiratory: No dyspnea, cough  CV: No chest pain  GI: No abdominal pain, diarrhea/constipation,   : No dysuria, hematuria  MSK: No edema    All other systems were reviewed and are negative, except as noted.    VITALS/PHYSICAL EXAM  --------------------------------------------------------------------------------  T(C): 36.7 (12-19-23 @ 18:30), Max: 36.7 (12-19-23 @ 18:30)  HR: 96 (12-19-23 @ 18:30) (82 - 96)  BP: 185/135 (12-19-23 @ 18:30) (185/135 - 192/70)  RR: 16 (12-19-23 @ 18:30) (16 - 18)  SpO2: 100% (12-19-23 @ 13:29) (100% - 100%)  Wt(kg): --  Height (cm): 175.3 (12-19-23 @ 13:29)  Weight (kg): 81.6 (12-19-23 @ 13:29)  BMI (kg/m2): 26.6 (12-19-23 @ 13:29)  BSA (m2): 1.98 (12-19-23 @ 13:29)    Physical Exam:  Gen: NAD, eating dinner  HEENT: MMM  Pulm: CTA B/L  CV: S1S2  Abd: Soft, +BS, non-tender to palpation  Ext: B/L LE edema  Neuro: Awake, confused but answers most questions appropriately  Skin: Warm and dry    LABS/STUDIES  --------------------------------------------------------------------------------              9.6    4.09  >-----------<  159      [12-19-23 @ 15:21]              29.4     139  |  105  |  75  ----------------------------<  59      [12-19-23 @ 15:21]  4.4   |  22  |  4.46        Ca     8.8     [12-19-23 @ 15:21]    TPro  7.2  /  Alb  4.1  /  TBili  0.2  /  DBili  x   /  AST  21  /  ALT  11  /  AlkPhos  96  [12-19-23 @ 15:21]    Creatinine Trend:  SCr 4.46 [12-19 @ 15:21] [facility] DIVISION OF KIDNEY DISEASES AND HYPERTENSION -- [phone]  -- INITIAL CONSULT NOTE  --------------------------------------------------------------------------------    HPI: 78M with PMH of HTN, HLD, Afib, BPH, COVID-19, dementia, insomnia, anemia, chronic viral hepatitis, MDD, and schizoaffective disorder who was sent in from [facility] for aggressive behavior and agitation. Nephrology Service consulted for HAYLEY. Of note, pt is poor historian therefore majority of history obtained from paperwork from Nursing Home paperwork.     On review of labs on [place], no prior labs available. Admission Scr was 4.46 with eGFR of 13 on 12/19/23.     Pt seen and evaluated in the ED. Pt reports feeling well and offers no complaints at this time. Denies HA, fevers/chills, CP, SOB, abdominal pain, urinary symptoms, n/v, diarrhea/constipation.     PAST HISTORY  --------------------------------------------------------------------------------  PAST MEDICAL & SURGICAL HISTORY:  As per HPI    FAMILY HISTORY:  Sister with kidney disease    PAST SOCIAL HISTORY:  No history of smoking    ALLERGIES & MEDICATIONS  --------------------------------------------------------------------------------  Allergies  Allergy Status Unknown    Intolerances    Standing Inpatient Medications  PRN Inpatient Medications  REVIEW OF SYSTEMS  --------------------------------------------------------------------------------  Gen: No fevers/chills  Skin: No rashes  Head/Eyes/Ears: No HA  Respiratory: No dyspnea, cough  CV: No chest pain  GI: No abdominal pain, diarrhea/constipation,   : No dysuria, hematuria  MSK: No edema    All other systems were reviewed and are negative, except as noted.    VITALS/PHYSICAL EXAM  --------------------------------------------------------------------------------  T(C): 36.7 (12-19-23 @ 18:30), Max: 36.7 (12-19-23 @ 18:30)  HR: 96 (12-19-23 @ 18:30) (82 - 96)  BP: 185/135 (12-19-23 @ 18:30) (185/135 - 192/70)  RR: 16 (12-19-23 @ 18:30) (16 - 18)  SpO2: 100% (12-19-23 @ 13:29) (100% - 100%)  Wt(kg): --  Height (cm): 175.3 (12-19-23 @ 13:29)  Weight (kg): 81.6 (12-19-23 @ 13:29)  BMI (kg/m2): 26.6 (12-19-23 @ 13:29)  BSA (m2): 1.98 (12-19-23 @ 13:29)    Physical Exam:  Gen: NAD, eating dinner  HEENT: MMM  Pulm: CTA B/L  CV: S1S2  Abd: Soft, +BS, non-tender to palpation  Ext: B/L LE edema  Neuro: Awake, confused but answers most questions appropriately  Skin: Warm and dry    LABS/STUDIES  --------------------------------------------------------------------------------              9.6    4.09  >-----------<  159      [12-19-23 @ 15:21]              29.4     139  |  105  |  75  ----------------------------<  59      [12-19-23 @ 15:21]  4.4   |  22  |  4.46        Ca     8.8     [12-19-23 @ 15:21]    TPro  7.2  /  Alb  4.1  /  TBili  0.2  /  DBili  x   /  AST  21  /  ALT  11  /  AlkPhos  96  [12-19-23 @ 15:21]    Creatinine Trend:  SCr 4.46 [12-19 @ 15:21]

## 2023-12-19 NOTE — ED PROVIDER NOTE - PROGRESS NOTE DETAILS
Jennifer (Gonzalo Levin PGY3 psych aware and will eval. Jennifer Levin (Rodriguez) PGY3 psych at bedside. Junior PGY3: contacted [facility]. Patient was sent in for elevated BUN/Cr (Cr 5) yesterday. Unknown baseline. hx of DM. Patient is cleared by psych. Will be admitted to medicine. Nephrology consult placed. Agreeable to evaluate the patient. Junior PGY3: contacted [facility]. Patient was sent in for elevated BUN/Cr (Cr 5) yesterday. Unknown baseline. hx of DM. Patient is cleared by psych. Will be admitted to medicine. Nephrology consult placed. Agreeable to evaluate the patient.

## 2023-12-19 NOTE — ED PROVIDER NOTE - NS ED ROS FT
Constitutional: no fevers, chills  HEENT: no HA, vision changes, rhinorrhea, sore throat  Cardiac: no chest pain, palpitations  Respiratory: no SOB, cough or hemoptysis  GI: no n/v/d/c, abd pain, bloody or dark stools  : no dysuria, frequency, or hematuria  MSK: no joint pain, neck pain or back pain  Skin: no rashes, jaundice, pruritis  Neuro: no numbness/tingling, weakness, unsteady gait  Psych: no suicidal thoughts

## 2023-12-19 NOTE — ED ADULT TRIAGE NOTE - ARRIVAL FROM
[facility]

## 2023-12-19 NOTE — ED ADULT NURSE NOTE - OBJECTIVE STATEMENT
78 year old male BIB ambulance from [facility] for aggression towards staff and residents. Pt denied suicidal  and homicidal ideations, delusions, hallucinations. Pt said he has lived at the residence for a few years and he uses a wheelchair to get around. Pt arrived very loud and verbally abusive to staff but eventually calmed down w/o PRN. Pt said he usually goes to the [facility] for follow up. Pt stated he was at dialysis two days ago and has been compliant with meds. Pt denied use of illicit drugs and alcohol but said he smokes cigarettes. 78 year old male BIB ambulance from [facility] for aggression towards staff and residents. Pt denied suicidal  and homicidal ideations, delusions, hallucinations. Pt said he has lived at the residence for a few years and he uses a wheelchair to get around. Pt arrived very loud and verbally abusive to staff but eventually calmed down w/o PRN. Pt said he usually goes to the [facility] for follow up. Pt stated he was at dialysis two days ago and has been compliant with meds. Pt denied use of illicit drugs and alcohol but said he smokes cigarettes.

## 2023-12-19 NOTE — ED PROVIDER NOTE - OBJECTIVE STATEMENT
78 hx MDD schizoaffective presents from [facility] for threatening to hit people with a bat. denies any recent trauma fever chills cp sob suicidal thoughts. "nothing is wrong [with me] just take the blood and send me back" 78 hx MDD schizoaffective presents from [facility] for threatening to hit people with a bat. denies any recent trauma fever chills cp sob suicidal thoughts. "nothing is wrong [with me] just take the blood and send me back"

## 2023-12-20 LAB
GLUCOSE BLDC GLUCOMTR-MCNC: 123 MG/DL — HIGH (ref 70–99)
GLUCOSE BLDC GLUCOMTR-MCNC: 123 MG/DL — HIGH (ref 70–99)
GLUCOSE BLDC GLUCOMTR-MCNC: 148 MG/DL — HIGH (ref 70–99)
GLUCOSE BLDC GLUCOMTR-MCNC: 148 MG/DL — HIGH (ref 70–99)
GLUCOSE BLDC GLUCOMTR-MCNC: 149 MG/DL — HIGH (ref 70–99)
GLUCOSE BLDC GLUCOMTR-MCNC: 149 MG/DL — HIGH (ref 70–99)
GLUCOSE BLDC GLUCOMTR-MCNC: 213 MG/DL — HIGH (ref 70–99)
GLUCOSE BLDC GLUCOMTR-MCNC: 213 MG/DL — HIGH (ref 70–99)
GLUCOSE BLDC GLUCOMTR-MCNC: 56 MG/DL — LOW (ref 70–99)
GLUCOSE BLDC GLUCOMTR-MCNC: 56 MG/DL — LOW (ref 70–99)
GLUCOSE BLDC GLUCOMTR-MCNC: 59 MG/DL — LOW (ref 70–99)
GLUCOSE BLDC GLUCOMTR-MCNC: 59 MG/DL — LOW (ref 70–99)
GLUCOSE BLDC GLUCOMTR-MCNC: 76 MG/DL — SIGNIFICANT CHANGE UP (ref 70–99)
GLUCOSE BLDC GLUCOMTR-MCNC: 76 MG/DL — SIGNIFICANT CHANGE UP (ref 70–99)
TSH SERPL-MCNC: 2.59 UIU/ML — SIGNIFICANT CHANGE UP (ref 0.27–4.2)
TSH SERPL-MCNC: 2.59 UIU/ML — SIGNIFICANT CHANGE UP (ref 0.27–4.2)

## 2023-12-20 PROCEDURE — 99223 1ST HOSP IP/OBS HIGH 75: CPT

## 2023-12-20 PROCEDURE — 99231 SBSQ HOSP IP/OBS SF/LOW 25: CPT

## 2023-12-20 RX ORDER — DEXTROSE 50 % IN WATER 50 %
12.5 SYRINGE (ML) INTRAVENOUS ONCE
Refills: 0 | Status: COMPLETED | OUTPATIENT
Start: 2023-12-20 | End: 2023-12-20

## 2023-12-20 RX ORDER — HALOPERIDOL DECANOATE 100 MG/ML
5 INJECTION INTRAMUSCULAR ONCE
Refills: 0 | Status: COMPLETED | OUTPATIENT
Start: 2023-12-20 | End: 2023-12-20

## 2023-12-20 RX ORDER — OLANZAPINE 15 MG/1
5 TABLET, FILM COATED ORAL ONCE
Refills: 0 | Status: DISCONTINUED | OUTPATIENT
Start: 2023-12-20 | End: 2023-12-20

## 2023-12-20 RX ORDER — CHLORHEXIDINE GLUCONATE 213 G/1000ML
1 SOLUTION TOPICAL DAILY
Refills: 0 | Status: DISCONTINUED | OUTPATIENT
Start: 2023-12-20 | End: 2023-12-28

## 2023-12-20 RX ORDER — DIVALPROEX SODIUM 500 MG/1
500 TABLET, DELAYED RELEASE ORAL ONCE
Refills: 0 | Status: COMPLETED | OUTPATIENT
Start: 2023-12-20 | End: 2023-12-20

## 2023-12-20 RX ADMIN — Medication 25 MILLIGRAM(S): at 14:09

## 2023-12-20 RX ADMIN — TAMSULOSIN HYDROCHLORIDE 0.4 MILLIGRAM(S): 0.4 CAPSULE ORAL at 22:31

## 2023-12-20 RX ADMIN — Medication 12.5 GRAM(S): at 09:11

## 2023-12-20 RX ADMIN — ISOSORBIDE MONONITRATE 30 MILLIGRAM(S): 60 TABLET, EXTENDED RELEASE ORAL at 11:55

## 2023-12-20 RX ADMIN — Medication 100 MILLIGRAM(S): at 06:04

## 2023-12-20 RX ADMIN — CHLORHEXIDINE GLUCONATE 1 APPLICATION(S): 213 SOLUTION TOPICAL at 11:56

## 2023-12-20 RX ADMIN — INSULIN GLARGINE 8 UNIT(S): 100 INJECTION, SOLUTION SUBCUTANEOUS at 22:32

## 2023-12-20 RX ADMIN — FINASTERIDE 5 MILLIGRAM(S): 5 TABLET, FILM COATED ORAL at 11:54

## 2023-12-20 RX ADMIN — Medication 2: at 16:42

## 2023-12-20 RX ADMIN — DIVALPROEX SODIUM 500 MILLIGRAM(S): 500 TABLET, DELAYED RELEASE ORAL at 06:04

## 2023-12-20 RX ADMIN — DIVALPROEX SODIUM 500 MILLIGRAM(S): 500 TABLET, DELAYED RELEASE ORAL at 18:19

## 2023-12-20 RX ADMIN — HALOPERIDOL DECANOATE 5 MILLIGRAM(S): 100 INJECTION INTRAMUSCULAR at 02:32

## 2023-12-20 RX ADMIN — Medication 25 MILLIGRAM(S): at 22:32

## 2023-12-20 RX ADMIN — Medication 100 MILLIGRAM(S): at 22:32

## 2023-12-20 RX ADMIN — CLOPIDOGREL BISULFATE 75 MILLIGRAM(S): 75 TABLET, FILM COATED ORAL at 11:54

## 2023-12-20 NOTE — PATIENT PROFILE ADULT - FUNCTIONAL ASSESSMENT - BASIC MOBILITY 6.
2-calculated by average/Not able to assess (calculate score using [facility] averaging method)  2-calculated by average/Not able to assess (calculate score using [facility] averaging method)

## 2023-12-20 NOTE — PATIENT PROFILE ADULT - DO YOU FEEL LIKE HURTING YOURSELF OR OTHERS?
Clinic Administered Medication Documentation    MEDICATION LIST:   Injectable Medication Documentation    Patient was given Cyanocobalamin (B-12). Prior to medication administration, verified patients identity using patient s name and date of birth. Please see MAR and medication order for additional information. Patient instructed to remain in clinic for 15 minutes and report any adverse reaction to staff immediately .      Was entire vial of medication used? Yes  Vial/Syringe: Single dose vial  Expiration Date:  07/21  Was this medication supplied by the patient? No   Emmy Hauser LPN       no

## 2023-12-20 NOTE — PATIENT PROFILE ADULT - FALL HARM RISK - HARM RISK INTERVENTIONS
Assistance with ambulation/Assistance OOB with selected safe patient handling equipment/Communicate Risk of Fall with Harm to all staff/Discuss with provider need for PT consult/Monitor for mental status changes/Monitor gait and stability/Move patient closer to nurses' station/Provide patient with walking aids - walker, cane, crutches/Reinforce activity limits and safety measures with patient and family/Reorient to person, place and time as needed/Tailored Fall Risk Interventions/Toileting schedule using arm’s reach rule for commode and bathroom/Use of alarms - bed, chair and/or voice tab/Visual Cue: Yellow wristband and red socks/Bed in lowest position, wheels locked, appropriate side rails in place/Call bell, personal items and telephone in reach/Instruct patient to call for assistance before getting out of bed or chair/Non-slip footwear when patient is out of bed/[place] to call system/Physically safe environment - no spills, clutter or unnecessary equipment/Purposeful Proactive Rounding/Room/bathroom lighting operational, light cord in reach Assistance with ambulation/Assistance OOB with selected safe patient handling equipment/Communicate Risk of Fall with Harm to all staff/Discuss with provider need for PT consult/Monitor for mental status changes/Monitor gait and stability/Move patient closer to nurses' station/Provide patient with walking aids - walker, cane, crutches/Reinforce activity limits and safety measures with patient and family/Reorient to person, place and time as needed/Tailored Fall Risk Interventions/Toileting schedule using arm’s reach rule for commode and bathroom/Use of alarms - bed, chair and/or voice tab/Visual Cue: Yellow wristband and red socks/Bed in lowest position, wheels locked, appropriate side rails in place/Call bell, personal items and telephone in reach/Instruct patient to call for assistance before getting out of bed or chair/Non-slip footwear when patient is out of bed/[place] to call system/Physically safe environment - no spills, clutter or unnecessary equipment/Purposeful Proactive Rounding/Room/bathroom lighting operational, light cord in reach

## 2023-12-20 NOTE — PROGRESS NOTE ADULT - SUBJECTIVE AND OBJECTIVE BOX
MILTON ARIAS  78y Male  MRN:[id number]    Patient is a 78y old  Male who presents with a chief complaint of   HPI:  Pt is a 77 y/o male, hx Schizophrenia, dementia, in current psych tx at [facility] Dr. Dupree, on depakote , trazodone, bib ems for agitation at half-way, was threatening to hurt staff with bat. Pt poor historian, talking very loudly at baseline, yelling. Pt denies being agitated at the nursing home, denies threatening staff. Pt denies depression, anxiety, fili, psychosis. pt reports fair sleep, appetite. pt repeatedly stated he just wants to go home. Pt states he usually goes to the [facility] for dialysis treatment, upset he was brought to [facility]. collateral obtained from supervisor, Yovana [phone], states pt has been noncompliant with meds for long time, not attending dialysis treatmetns. They are concenred pt has become more irritable, threatenign staff at times, sometimes hitting staff. Pt has not reported SI, no psychosis, fili.  SAD, schizophrenia  pt poor historian unable to verify history (19 Dec 2023 20:56)      Patient seen and evaluated at bedside. No acute events overnight except as noted.    Interval HPI: overnight events noted     PAST MEDICAL & SURGICAL HISTORY:  ESRD on dialysis      Schizophrenia          REVIEW OF SYSTEMS:  as per hpi       VITALS:  Vital Signs Last 24 Hrs  T(C): 36.3 (20 Dec 2023 08:58), Max: 36.8 (19 Dec 2023 20:45)  T(F): 97.3 (20 Dec 2023 08:58), Max: 98.2 (19 Dec 2023 20:45)  HR: 65 (20 Dec 2023 11:49) (57 - 96)  BP: 183/96 (20 Dec 2023 11:49) (160/85 - 209/90)  BP(mean): --  RR: 18 (20 Dec 2023 08:58) (16 - 18)  SpO2: 98% (20 Dec 2023 08:58) (97% - 99%)    Parameters below as of 20 Dec 2023 08:58  Patient On (Oxygen Delivery Method): room air      CAPILLARY BLOOD GLUCOSE      POCT Blood Glucose.: 76 mg/dL (20 Dec 2023 12:08)  POCT Blood Glucose.: 148 mg/dL (20 Dec 2023 09:31)  POCT Blood Glucose.: 59 mg/dL (20 Dec 2023 09:07)  POCT Blood Glucose.: 56 mg/dL (20 Dec 2023 08:56)  POCT Blood Glucose.: 167 mg/dL (19 Dec 2023 22:08)    I&O's Summary      PHYSICAL EXAM:  GENERAL: NAD, well-developed  HEAD:  Atraumatic, Normocephalic  EYES: EOMI, PERRLA, conjunctiva and sclera clear  NECK: Supple, No JVD  CHEST/LUNG: Clear to auscultation bilaterally; No wheeze  HEART: S1, S2; No murmurs, rubs, or gallops  ABDOMEN: Soft, Nontender, Nondistended; Bowel sounds present  EXTREMITIES:  2+ Peripheral Pulses, No clubbing, cyanosis, or edema  PSYCH: Normal affect  NEUROLOGY: AAOX2-3; non-focal  SKIN: No rashes or lesions    Consultant(s) Notes Reviewed:  [x ] YES  [ ] NO  Care Discussed with Consultants/Other Providers [ x] YES  [ ] NO    MEDS:  MEDICATIONS  (STANDING):  chlorhexidine 2% Cloths 1 Application(s) Topical daily  clopidogrel Tablet 75 milliGRAM(s) Oral daily  dextrose 5%. 1000 milliLiter(s) (100 mL/Hr) IV Continuous <Continuous>  dextrose 5%. 1000 milliLiter(s) (50 mL/Hr) IV Continuous <Continuous>  dextrose 50% Injectable 25 Gram(s) IV Push once  dextrose 50% Injectable 25 Gram(s) IV Push once  dextrose 50% Injectable 12.5 Gram(s) IV Push once  divalproex  milliGRAM(s) Oral two times a day  finasteride 5 milliGRAM(s) Oral daily  glucagon  Injectable 1 milliGRAM(s) IntraMuscular once  hydrALAZINE 25 milliGRAM(s) Oral every 8 hours  insulin glargine Injectable (LANTUS) 8 Unit(s) SubCutaneous at bedtime  insulin lispro (ADMELOG) corrective regimen sliding scale   SubCutaneous three times a day before meals  isosorbide   mononitrate ER Tablet (IMDUR) 30 milliGRAM(s) Oral daily  metoprolol tartrate 100 milliGRAM(s) Oral daily  tamsulosin 0.4 milliGRAM(s) Oral at bedtime  traZODone 100 milliGRAM(s) Oral at bedtime    MEDICATIONS  (PRN):  dextrose Oral Gel 15 Gram(s) Oral once PRN Blood Glucose LESS THAN 70 milliGRAM(s)/deciliter    ALLERGIES:  Allergy Status Unknown      LABS:                        9.6    4.09  )-----------( 159      ( 19 Dec 2023 15:21 )             29.4         139  |  105  |  75<H>  ----------------------------<  59<L>  4.4   |  22  |  4.46<H>    Ca    8.8      19 Dec 2023 15:21    TPro  7.2  /  Alb  4.1  /  TBili  0.2  /  DBili  x   /  AST  21  /  ALT  11  /  AlkPhos  96            LIVER FUNCTIONS - ( 19 Dec 2023 15:21 )  Alb: 4.1 g/dL / Pro: 7.2 g/dL / ALK PHOS: 96 U/L / ALT: 11 U/L / AST: 21 U/L / GGT: x           Urinalysis Basic - ( 19 Dec 2023 15:51 )    Color: Yellow / Appearance: Clear / S.009 / pH: x  Gluc: x / Ketone: Negative mg/dL  / Bili: Negative / Urobili: 0.2 mg/dL   Blood: x / Protein: 30 mg/dL / Nitrite: Negative   Leuk Esterase: Negative / RBC: 0 /HPF / WBC 1 /HPF   Sq Epi: x / Non Sq Epi: 0 /HPF / Bacteria: Negative /HPF      TSH: Thyroid Stimulating Hormone, Serum: 2.59 uIU/mL ( @ 15:21)     < from: US Kidney and Bladder (23 @ 23:39) >  IMPRESSION:  1.  No hydronephrosis.  2.  Increased echogenicity compatible with medical renal disease.  3.  Layering debris within the urinary bladder. Correlate with urinalysis.    --- End of Report ---        < end of copied text >   MILTON ARIAS  78y Male  MRN:[id number]    Patient is a 78y old  Male who presents with a chief complaint of   HPI:  Pt is a 77 y/o male, hx Schizophrenia, dementia, in current psych tx at [facility] Dr. Dupree, on depakote , trazodone, bib ems for agitation at [facility], was threatening to hurt staff with bat. Pt poor historian, talking very loudly at baseline, yelling. Pt denies being agitated at the nursing home, denies threatening staff. Pt denies depression, anxiety, fili, psychosis. pt reports fair sleep, appetite. pt repeatedly stated he just wants to go home. Pt states he usually goes to the  for dialysis treatment, upset he was brought to [facility]. collateral obtained from supervisor, Yovana [phone], states pt has been noncompliant with meds for long time, not attending dialysis treatmetns. They are concenred pt has become more irritable, threatenign staff at times, sometimes hitting staff. Pt has not reported SI, no psychosis, fili.  SAD, schizophrenia  pt poor historian unable to verify history (19 Dec 2023 20:56)      Patient seen and evaluated at bedside. No acute events overnight except as noted.    Interval HPI: overnight events noted     PAST MEDICAL & SURGICAL HISTORY:  ESRD on dialysis      Schizophrenia          REVIEW OF SYSTEMS:  as per hpi       VITALS:  Vital Signs Last 24 Hrs  T(C): 36.3 (20 Dec 2023 08:58), Max: 36.8 (19 Dec 2023 20:45)  T(F): 97.3 (20 Dec 2023 08:58), Max: 98.2 (19 Dec 2023 20:45)  HR: 65 (20 Dec 2023 11:49) (57 - 96)  BP: 183/96 (20 Dec 2023 11:49) (160/85 - 209/90)  BP(mean): --  RR: 18 (20 Dec 2023 08:58) (16 - 18)  SpO2: 98% (20 Dec 2023 08:58) (97% - 99%)    Parameters below as of 20 Dec 2023 08:58  Patient On (Oxygen Delivery Method): room air      CAPILLARY BLOOD GLUCOSE      POCT Blood Glucose.: 76 mg/dL (20 Dec 2023 12:08)  POCT Blood Glucose.: 148 mg/dL (20 Dec 2023 09:31)  POCT Blood Glucose.: 59 mg/dL (20 Dec 2023 09:07)  POCT Blood Glucose.: 56 mg/dL (20 Dec 2023 08:56)  POCT Blood Glucose.: 167 mg/dL (19 Dec 2023 22:08)    I&O's Summary      PHYSICAL EXAM:  GENERAL: NAD, well-developed  HEAD:  Atraumatic, Normocephalic  EYES: EOMI, PERRLA, conjunctiva and sclera clear  NECK: Supple, No JVD  CHEST/LUNG: Clear to auscultation bilaterally; No wheeze  HEART: S1, S2; No murmurs, rubs, or gallops  ABDOMEN: Soft, Nontender, Nondistended; Bowel sounds present  EXTREMITIES:  2+ Peripheral Pulses, No clubbing, cyanosis, or edema  PSYCH: Normal affect  NEUROLOGY: AAOX2-3; non-focal  SKIN: No rashes or lesions    Consultant(s) Notes Reviewed:  [x ] YES  [ ] NO  Care Discussed with Consultants/Other Providers [ x] YES  [ ] NO    MEDS:  MEDICATIONS  (STANDING):  chlorhexidine 2% Cloths 1 Application(s) Topical daily  clopidogrel Tablet 75 milliGRAM(s) Oral daily  dextrose 5%. 1000 milliLiter(s) (100 mL/Hr) IV Continuous <Continuous>  dextrose 5%. 1000 milliLiter(s) (50 mL/Hr) IV Continuous <Continuous>  dextrose 50% Injectable 25 Gram(s) IV Push once  dextrose 50% Injectable 25 Gram(s) IV Push once  dextrose 50% Injectable 12.5 Gram(s) IV Push once  divalproex  milliGRAM(s) Oral two times a day  finasteride 5 milliGRAM(s) Oral daily  glucagon  Injectable 1 milliGRAM(s) IntraMuscular once  hydrALAZINE 25 milliGRAM(s) Oral every 8 hours  insulin glargine Injectable (LANTUS) 8 Unit(s) SubCutaneous at bedtime  insulin lispro (ADMELOG) corrective regimen sliding scale   SubCutaneous three times a day before meals  isosorbide   mononitrate ER Tablet (IMDUR) 30 milliGRAM(s) Oral daily  metoprolol tartrate 100 milliGRAM(s) Oral daily  tamsulosin 0.4 milliGRAM(s) Oral at bedtime  traZODone 100 milliGRAM(s) Oral at bedtime    MEDICATIONS  (PRN):  dextrose Oral Gel 15 Gram(s) Oral once PRN Blood Glucose LESS THAN 70 milliGRAM(s)/deciliter    ALLERGIES:  Allergy Status Unknown      LABS:                        9.6    4.09  )-----------( 159      ( 19 Dec 2023 15:21 )             29.4         139  |  105  |  75<H>  ----------------------------<  59<L>  4.4   |  22  |  4.46<H>    Ca    8.8      19 Dec 2023 15:21    TPro  7.2  /  Alb  4.1  /  TBili  0.2  /  DBili  x   /  AST  21  /  ALT  11  /  AlkPhos  96            LIVER FUNCTIONS - ( 19 Dec 2023 15:21 )  Alb: 4.1 g/dL / Pro: 7.2 g/dL / ALK PHOS: 96 U/L / ALT: 11 U/L / AST: 21 U/L / GGT: x           Urinalysis Basic - ( 19 Dec 2023 15:51 )    Color: Yellow / Appearance: Clear / S.009 / pH: x  Gluc: x / Ketone: Negative mg/dL  / Bili: Negative / Urobili: 0.2 mg/dL   Blood: x / Protein: 30 mg/dL / Nitrite: Negative   Leuk Esterase: Negative / RBC: 0 /HPF / WBC 1 /HPF   Sq Epi: x / Non Sq Epi: 0 /HPF / Bacteria: Negative /HPF      TSH: Thyroid Stimulating Hormone, Serum: 2.59 uIU/mL ( @ 15:21)     < from: US Kidney and Bladder (23 @ 23:39) >  IMPRESSION:  1.  No hydronephrosis.  2.  Increased echogenicity compatible with medical renal disease.  3.  Layering debris within the urinary bladder. Correlate with urinalysis.    --- End of Report ---        < end of copied text >

## 2023-12-20 NOTE — BH CONSULTATION LIAISON PROGRESS NOTE - NSBHASSESSMENTFT_PSY_ALL_CORE
Pt is a 77 y/o male, hx Schizophrenia, dementia, in current psych tx at [facility] Dr. Dupree, on depakote , trazodone, bib ems for agitation at senior care, was threatening to hurt staff with bat. Pt poor historian, talking very loudly at baseline, yelling. Pt denies being agitated at the nursing home, denies threatening staff. Pt denies depression, anxiety, fili, psychosis. pt reports fair sleep, appetite. pt repeatedly stated he just wants to go home. Pt states he usually goes to the [facility] for dialysis treatment, upset he was brought to [facility]. collateral obtained from supervisor, Yovana  Pt is a 77 y/o male, hx Schizophrenia, dementia, in current psych tx at [facility] Dr. Dupree, on depakote , trazodone, bib ems for agitation at longterm, was threatening to hurt staff with bat. Pt poor historian, talking very loudly at baseline, yelling. Pt denies being agitated at the nursing home, denies threatening staff. Pt denies depression, anxiety, fili, psychosis. pt reports fair sleep, appetite. pt repeatedly stated he just wants to go home. Pt states he usually goes to the [facility] for dialysis treatment, upset he was brought to [facility]. collateral obtained from supervisor, Yovana

## 2023-12-20 NOTE — BH CONSULTATION LIAISON PROGRESS NOTE - NSBHCONSULTFOLLOWAFTERCARE_PSY_A_CORE FT
f/u with outpt psychiarist Dr. Toledo at [facility]  f/u with outpt psychiarist Dr. Toledo at [facility]

## 2023-12-20 NOTE — CHART NOTE - NSCHARTNOTEFT_GEN_A_CORE
pt is very agitated, combative, climbing out of bed, unable to be reoriented,   s/p Ativan 1 mg IM in ER,   --   - -Haldol 5 mg IM administered, pt is still severely agitated, and Zyprexa 5 mg ordered (not given),   __ but eventually calmed down and is resting in bed comfortably,   -- continue home meds: depakote 500 mg BID and Trazodone 100 mg at bedtime   __ follow up psych recs,   -- continue monitoring

## 2023-12-21 LAB
GLUCOSE BLDC GLUCOMTR-MCNC: 112 MG/DL — HIGH (ref 70–99)
GLUCOSE BLDC GLUCOMTR-MCNC: 112 MG/DL — HIGH (ref 70–99)
GLUCOSE BLDC GLUCOMTR-MCNC: 126 MG/DL — HIGH (ref 70–99)
GLUCOSE BLDC GLUCOMTR-MCNC: 126 MG/DL — HIGH (ref 70–99)
GLUCOSE BLDC GLUCOMTR-MCNC: 176 MG/DL — HIGH (ref 70–99)
GLUCOSE BLDC GLUCOMTR-MCNC: 176 MG/DL — HIGH (ref 70–99)
GLUCOSE BLDC GLUCOMTR-MCNC: 195 MG/DL — HIGH (ref 70–99)
GLUCOSE BLDC GLUCOMTR-MCNC: 195 MG/DL — HIGH (ref 70–99)

## 2023-12-21 PROCEDURE — 99232 SBSQ HOSP IP/OBS MODERATE 35: CPT

## 2023-12-21 RX ADMIN — DIVALPROEX SODIUM 500 MILLIGRAM(S): 500 TABLET, DELAYED RELEASE ORAL at 17:43

## 2023-12-21 RX ADMIN — Medication 100 MILLIGRAM(S): at 05:22

## 2023-12-21 RX ADMIN — FINASTERIDE 5 MILLIGRAM(S): 5 TABLET, FILM COATED ORAL at 12:32

## 2023-12-21 RX ADMIN — ISOSORBIDE MONONITRATE 30 MILLIGRAM(S): 60 TABLET, EXTENDED RELEASE ORAL at 12:33

## 2023-12-21 RX ADMIN — Medication 1: at 12:32

## 2023-12-21 RX ADMIN — Medication 25 MILLIGRAM(S): at 14:10

## 2023-12-21 RX ADMIN — Medication 25 MILLIGRAM(S): at 21:45

## 2023-12-21 RX ADMIN — DIVALPROEX SODIUM 500 MILLIGRAM(S): 500 TABLET, DELAYED RELEASE ORAL at 05:22

## 2023-12-21 RX ADMIN — CHLORHEXIDINE GLUCONATE 1 APPLICATION(S): 213 SOLUTION TOPICAL at 12:33

## 2023-12-21 RX ADMIN — CLOPIDOGREL BISULFATE 75 MILLIGRAM(S): 75 TABLET, FILM COATED ORAL at 12:33

## 2023-12-21 RX ADMIN — Medication 100 MILLIGRAM(S): at 21:45

## 2023-12-21 RX ADMIN — INSULIN GLARGINE 8 UNIT(S): 100 INJECTION, SOLUTION SUBCUTANEOUS at 21:45

## 2023-12-21 RX ADMIN — TAMSULOSIN HYDROCHLORIDE 0.4 MILLIGRAM(S): 0.4 CAPSULE ORAL at 21:45

## 2023-12-21 RX ADMIN — Medication 25 MILLIGRAM(S): at 05:22

## 2023-12-21 NOTE — DISCHARGE NOTE PROVIDER - NSDCCPCAREPLAN_GEN_ALL_CORE_FT
PRINCIPAL DISCHARGE DIAGNOSIS  Diagnosis: Kidney failure  Assessment and Plan of Treatment: Avoid taking NSAIDs (ex: Ibuprofen, Advil, Celebrex, Naprosyn) and other agents that can harm the kidneys such as intravenous contrast for diagnostic testing, combination cold medications, etc. until you are instructed to do so by your Primary Care Physician.  Have all of your medications adjusted for your renal function by your Health Care Provider.  Blood pressure control is important.  Take all medication as prescribed.  Do not overconsume foods that are high in potassium, such as bananas, until you are instructed to do so by your primary care physician.        SECONDARY DISCHARGE DIAGNOSES  Diagnosis: Schizophrenia, unspecified type  Assessment and Plan of Treatment: Continue outpatient follow up with psychiatrist Dr. Toledo at [facility].     PRINCIPAL DISCHARGE DIAGNOSIS  Diagnosis: Kidney failure  Assessment and Plan of Treatment: Avoid taking NSAIDs (ex: Ibuprofen, Advil, Celebrex, Naprosyn) and other agents that can harm the kidneys such as intravenous contrast for diagnostic testing, combination cold medications, etc. until you are instructed to do so by your Primary Care Physician.  Have all of your medications adjusted for your renal function by your Health Care Provider.  Blood pressure control is important.  Take all medication as prescribed.  Do not overconsume foods that are high in potassium, such as bananas, until you are instructed to do so by your primary care physician.        SECONDARY DISCHARGE DIAGNOSES  Diagnosis: Schizophrenia, unspecified type  Assessment and Plan of Treatment: Continue outpatient follow up with psychiatrist Dr. Toledo at [facility].     PRINCIPAL DISCHARGE DIAGNOSIS  Diagnosis: Kidney failure  Assessment and Plan of Treatment: ESRD. Avoid taking NSAIDs (ex: Ibuprofen, Advil, Celebrex, Naprosyn) and other agents that can harm the kidneys such as intravenous contrast for diagnostic testing, combination cold medications, etc. until you are instructed to do so by your Primary Care Physician.  Have all of your medications adjusted for your renal function by your Health Care Provider.  Blood pressure control is important.  Take all medication as prescribed.  Do not overconsume foods that are high in potassium, such as bananas, until you are instructed to do so by your primary care physician.        SECONDARY DISCHARGE DIAGNOSES  Diagnosis: Schizophrenia, unspecified type  Assessment and Plan of Treatment: Continue outpatient follow up with psychiatrist Dr. Toledo at [facility].     PRINCIPAL DISCHARGE DIAGNOSIS  Diagnosis: Kidney failure  Assessment and Plan of Treatment: ESRD. Avoid taking NSAIDs (ex: Ibuprofen, Advil, Celebrex, Naprosyn) and other agents that can harm the kidneys such as intravenous contrast for diagnostic testing, combination cold medications, etc. until you are instructed to do so by your Primary Care Physician.  Have all of your medications adjusted for your renal function by your Health Care Provider.  Blood pressure control is important.  Take all medication as prescribed.  Do not overconsume foods that are high in potassium, such as bananas, until you are instructed to do so by your primary care physician.        SECONDARY DISCHARGE DIAGNOSES  Diagnosis: Schizophrenia, unspecified type  Assessment and Plan of Treatment: Continue outpatient follow up with psychiatrist Dr. Toledo at [facility].

## 2023-12-21 NOTE — DIETITIAN INITIAL EVALUATION ADULT - REASON INDICATOR FOR ASSESSMENT
Nutrition consult warranted for: MST score 2 or more   Information obtained from: electronic medical record, transfer records and RN. Pt with agitation; currently on a 1:1; unable to participate in nutritional assessment at this time.   Chart reviewed, events noted.

## 2023-12-21 NOTE — DISCHARGE NOTE PROVIDER - NSDCFUADDAPPT_GEN_ALL_CORE_FT
APPTS ARE READY TO BE MADE: [x] YES    Best Family or Patient Contact (if needed):    Additional Information about above appointments (if needed):    1:   2:   3:     Other comments or requests:    APPTS ARE READY TO BE MADE: [x] YES    Best Family or Patient Contact (if needed):    Additional Information about above appointments (if needed):    1:   2:   3:     Other comments or requests:   Patient is being discharged to [facility] nursing home/ rehab. Patient/caregiver will arrange follow up appointments.   APPTS ARE READY TO BE MADE: [x] YES    Best Family or Patient Contact (if needed):    Additional Information about above appointments (if needed):    1:   2:   3:     Other comments or requests:   Patient is being discharged to [facility] nursing home/ rehab. Patient/caregiver will arrange follow up appointments.

## 2023-12-21 NOTE — PROGRESS NOTE ADULT - SUBJECTIVE AND OBJECTIVE BOX
MILTON ARIAS  78y Male  MRN:[id number]    Patient is a 78y old  Male who presents with a chief complaint of   HPI:  Pt is a 79 y/o male, hx Schizophrenia, dementia, in current psych tx at [facility] Dr. Dupree, on depakote , trazodone, bib ems for agitation at assisted, was threatening to hurt staff with bat. Pt poor historian, talking very loudly at baseline, yelling. Pt denies being agitated at the nursing home, denies threatening staff. Pt denies depression, anxiety, fili, psychosis. pt reports fair sleep, appetite. pt repeatedly stated he just wants to go home. Pt states he usually goes to the [facility] for dialysis treatment, upset he was brought to [facility]. collateral obtained from supervisor, Yovana [phone], states pt has been noncompliant with meds for long time, not attending dialysis treatmetns. They are concenred pt has become more irritable, threatenign staff at times, sometimes hitting staff. Pt has not reported SI, no psychosis, fili.  SAD, schizophrenia  pt poor historian unable to verify history (19 Dec 2023 20:56)      Patient seen and evaluated at bedside. No acute events overnight except as noted.    Interval HPI: overnight events noted     PAST MEDICAL & SURGICAL HISTORY:  ESRD on dialysis      Schizophrenia          REVIEW OF SYSTEMS:  as per hpi       VITALS:   Vital Signs Last 24 Hrs  T(C): 36.2 (20 Dec 2023 18:13), Max: 36.2 (20 Dec 2023 18:13)  T(F): 97.2 (20 Dec 2023 18:13), Max: 97.2 (20 Dec 2023 18:13)  HR: 69 (21 Dec 2023 14:18) (69 - 73)  BP: 147/67 (21 Dec 2023 14:18) (138/64 - 163/70)  BP(mean): --  RR: 18 (21 Dec 2023 14:18) (18 - 18)  SpO2: 100% (21 Dec 2023 14:18) (99% - 100%)    Parameters below as of 21 Dec 2023 14:18  Patient On (Oxygen Delivery Method): room air        PHYSICAL EXAM:  GENERAL: NAD, well-developed,    HEAD:  Atraumatic, Normocephalic  EYES: EOMI, PERRLA, conjunctiva and sclera clear  NECK: Supple, No JVD  CHEST/LUNG: Clear to auscultation bilaterally; No wheeze  HEART: S1, S2; No murmurs, rubs, or gallops  ABDOMEN: Soft, Nontender, Nondistended; Bowel sounds present  EXTREMITIES:  2+ Peripheral Pulses, No clubbing, cyanosis, or edema  PSYCH: Normal affect  NEUROLOGY: AAOX2-3; non-focal  SKIN: No rashes or lesions    Consultant(s) Notes Reviewed:  [x ] YES  [ ] NO  Care Discussed with Consultants/Other Providers [ x] YES  [ ] NO    MEDS:   MEDICATIONS  (STANDING):  chlorhexidine 2% Cloths 1 Application(s) Topical daily  clopidogrel Tablet 75 milliGRAM(s) Oral daily  dextrose 5%. 1000 milliLiter(s) (50 mL/Hr) IV Continuous <Continuous>  dextrose 5%. 1000 milliLiter(s) (100 mL/Hr) IV Continuous <Continuous>  dextrose 50% Injectable 25 Gram(s) IV Push once  dextrose 50% Injectable 25 Gram(s) IV Push once  dextrose 50% Injectable 12.5 Gram(s) IV Push once  divalproex  milliGRAM(s) Oral two times a day  finasteride 5 milliGRAM(s) Oral daily  glucagon  Injectable 1 milliGRAM(s) IntraMuscular once  hydrALAZINE 25 milliGRAM(s) Oral every 8 hours  insulin glargine Injectable (LANTUS) 8 Unit(s) SubCutaneous at bedtime  insulin lispro (ADMELOG) corrective regimen sliding scale   SubCutaneous three times a day before meals  isosorbide   mononitrate ER Tablet (IMDUR) 30 milliGRAM(s) Oral daily  metoprolol tartrate 100 milliGRAM(s) Oral daily  tamsulosin 0.4 milliGRAM(s) Oral at bedtime  traZODone 100 milliGRAM(s) Oral at bedtime    MEDICATIONS  (PRN):  dextrose Oral Gel 15 Gram(s) Oral once PRN Blood Glucose LESS THAN 70 milliGRAM(s)/deciliter        ALLERGIES:  Allergy Status Unknown      LABS:       TSH: Thyroid Stimulating Hormone, Serum: 2.59 uIU/mL (12-19 @ 15:21)     < from: US Kidney and Bladder (12.19.23 @ 23:39) >  IMPRESSION:  1.  No hydronephrosis.  2.  Increased echogenicity compatible with medical renal disease.  3.  Layering debris within the urinary bladder. Correlate with urinalysis.    --- End of Report ---        < end of copied text >   MILTON ARIAS  78y Male  MRN:[id number]    Patient is a 78y old  Male who presents with a chief complaint of   HPI:  Pt is a 79 y/o male, hx Schizophrenia, dementia, in current psych tx at [facility] Dr. Dupree, on depakote , trazodone, bib ems for agitation at correction, was threatening to hurt staff with bat. Pt poor historian, talking very loudly at baseline, yelling. Pt denies being agitated at the nursing home, denies threatening staff. Pt denies depression, anxiety, fili, psychosis. pt reports fair sleep, appetite. pt repeatedly stated he just wants to go home. Pt states he usually goes to the [facility] for dialysis treatment, upset he was brought to [facility]. collateral obtained from supervisor, Yovana [phone], states pt has been noncompliant with meds for long time, not attending dialysis treatmetns. They are concenred pt has become more irritable, threatenign staff at times, sometimes hitting staff. Pt has not reported SI, no psychosis, fili.  SAD, schizophrenia  pt poor historian unable to verify history (19 Dec 2023 20:56)      Patient seen and evaluated at bedside. No acute events overnight except as noted.    Interval HPI: overnight events noted     PAST MEDICAL & SURGICAL HISTORY:  ESRD on dialysis      Schizophrenia          REVIEW OF SYSTEMS:  as per hpi       VITALS:   Vital Signs Last 24 Hrs  T(C): 36.2 (20 Dec 2023 18:13), Max: 36.2 (20 Dec 2023 18:13)  T(F): 97.2 (20 Dec 2023 18:13), Max: 97.2 (20 Dec 2023 18:13)  HR: 69 (21 Dec 2023 14:18) (69 - 73)  BP: 147/67 (21 Dec 2023 14:18) (138/64 - 163/70)  BP(mean): --  RR: 18 (21 Dec 2023 14:18) (18 - 18)  SpO2: 100% (21 Dec 2023 14:18) (99% - 100%)    Parameters below as of 21 Dec 2023 14:18  Patient On (Oxygen Delivery Method): room air        PHYSICAL EXAM:  GENERAL: NAD, well-developed,    HEAD:  Atraumatic, Normocephalic  EYES: EOMI, PERRLA, conjunctiva and sclera clear  NECK: Supple, No JVD  CHEST/LUNG: Clear to auscultation bilaterally; No wheeze  HEART: S1, S2; No murmurs, rubs, or gallops  ABDOMEN: Soft, Nontender, Nondistended; Bowel sounds present  EXTREMITIES:  2+ Peripheral Pulses, No clubbing, cyanosis, or edema  PSYCH: Normal affect  NEUROLOGY: AAOX2-3; non-focal  SKIN: No rashes or lesions    Consultant(s) Notes Reviewed:  [x ] YES  [ ] NO  Care Discussed with Consultants/Other Providers [ x] YES  [ ] NO    MEDS:   MEDICATIONS  (STANDING):  chlorhexidine 2% Cloths 1 Application(s) Topical daily  clopidogrel Tablet 75 milliGRAM(s) Oral daily  dextrose 5%. 1000 milliLiter(s) (50 mL/Hr) IV Continuous <Continuous>  dextrose 5%. 1000 milliLiter(s) (100 mL/Hr) IV Continuous <Continuous>  dextrose 50% Injectable 25 Gram(s) IV Push once  dextrose 50% Injectable 25 Gram(s) IV Push once  dextrose 50% Injectable 12.5 Gram(s) IV Push once  divalproex  milliGRAM(s) Oral two times a day  finasteride 5 milliGRAM(s) Oral daily  glucagon  Injectable 1 milliGRAM(s) IntraMuscular once  hydrALAZINE 25 milliGRAM(s) Oral every 8 hours  insulin glargine Injectable (LANTUS) 8 Unit(s) SubCutaneous at bedtime  insulin lispro (ADMELOG) corrective regimen sliding scale   SubCutaneous three times a day before meals  isosorbide   mononitrate ER Tablet (IMDUR) 30 milliGRAM(s) Oral daily  metoprolol tartrate 100 milliGRAM(s) Oral daily  tamsulosin 0.4 milliGRAM(s) Oral at bedtime  traZODone 100 milliGRAM(s) Oral at bedtime    MEDICATIONS  (PRN):  dextrose Oral Gel 15 Gram(s) Oral once PRN Blood Glucose LESS THAN 70 milliGRAM(s)/deciliter        ALLERGIES:  Allergy Status Unknown      LABS:       TSH: Thyroid Stimulating Hormone, Serum: 2.59 uIU/mL (12-19 @ 15:21)     < from: US Kidney and Bladder (12.19.23 @ 23:39) >  IMPRESSION:  1.  No hydronephrosis.  2.  Increased echogenicity compatible with medical renal disease.  3.  Layering debris within the urinary bladder. Correlate with urinalysis.    --- End of Report ---        < end of copied text >

## 2023-12-21 NOTE — PROGRESS NOTE ADULT - PROBLEM SELECTOR PLAN 1
Pt with HAYLEY in the setting of uncontrolled hypertension and BPH. On review of labs [facility] [place], no prior labs available. Admission Scr was 4.46 with eGFR of 13 on 12/19/23. UA unremarkable and ultrasound with no obstruction.  At present there is no emergency indication for dialysis.  Will need collateral information about underyling disease according to patient he was being evaluated for dialysis.  Monitor labs and urine output. Avoid nephrotoxins. Dose medications as per eGFR. Pt with HAYLEY in the setting of uncontrolled hypertension and BPH. [facility] [place], no prior labs available. Admission Scr was 4.46 with eGFR of 13 on 12/19/23. UA unremarkable and ultrasound with no obstruction.  At present there is no emergency indication for dialysis.  Will need collateral information about underyling disease according to patient he was being evaluated for dialysis.  Monitor labs and urine output. Avoid nephrotoxins. Dose medications as per eGFR.

## 2023-12-21 NOTE — DISCHARGE NOTE PROVIDER - DISCHARGE SERVICE FOR PATIENT
on the discharge service for the patient. I have reviewed and made amendments to the documentation where necessary. -5.89

## 2023-12-21 NOTE — DIETITIAN INITIAL EVALUATION ADULT - ORAL INTAKE PTA/DIET HISTORY
Pt transferred from [facility]. Per transfer records, Pt was previously on a regular diet and thin liquids; receiving 3 meals a day. Pt was receiving Magic cups TID; noted use of ferrous sulfate and multivitamin. NKFA/intolerances recorded. No difficulties chewing/swallowing recorded.  Pt transferred from [facility]. Per transfer records, Pt was previously on a regular diet and thin liquids; receiving 3 meals a day. Pt was receiving Magic cups TID; noted use of ferrous sulfate and multivitamin. NKFA/intolerances recorded. No difficulties chewing/swallowing recorded.

## 2023-12-21 NOTE — DISCHARGE NOTE PROVIDER - HOSPITAL COURSE
HPI:  Pt is a 77 y/o male, hx Schizophrenia, dementia, in current psych tx at nursing home [facility] Dr. Dupree, on depakote , trazodone, bib ems for agitation at alf, was threatening to hurt staff with bat. Pt poor historian, talking very loudly at baseline, yelling. Pt denies being agitated at the nursing home, denies threatening staff. Pt denies depression, anxiety, fili, psychosis. pt reports fair sleep, appetite. pt repeatedly stated he just wants to go home. Pt states he usually goes to the [facility] for dialysis treatment, upset he was brought to [facility]. collateral obtained from supervisor, Yovana [phone], states pt has been noncompliant with meds for long time, not attending dialysis treatments. They are concerned pt has become more irritable, threatening staff at times, sometimes hitting staff. Pt has not reported SI, no psychosis, fili.  SAD, schizophrenia  pt poor historian unable to verify history (19 Dec 2023 20:56)    Hospital Course: Behavorial health team was consulted recommending continue depakote, trazodone and f/u with outpt psychiatrist Dr. Toledo at [facility]. Nephrology was consult for HAYLEY in the setting of uncontrolled hypertension and BPH. On [facility] [place], no prior labs available. Admission Scr was 4.46 with eGFR of 13 on 12/19/23. UA unremarkable and ultrasound with no obstruction. Per nephro - at present there is no emergency indication for dialysis. Avoid nephrotoxins.   Discharge/Dispo/Med rec discussed with attending  ____. Patient medically cleared for discharge ____ with outpatient follow up     Important Medication Changes and Reason:    Active or Pending Issues Requiring Follow-up:    Advanced Directives:   [ ] Full code  [ ] DNR  [ ] Hospice    Discharge Diagnoses:         HPI:  Pt is a 77 y/o male, hx Schizophrenia, dementia, in current psych tx at nursing home [facility] Dr. Dupree, on depakote , trazodone, bib ems for agitation at residential, was threatening to hurt staff with bat. Pt poor historian, talking very loudly at baseline, yelling. Pt denies being agitated at the nursing home, denies threatening staff. Pt denies depression, anxiety, fili, psychosis. pt reports fair sleep, appetite. pt repeatedly stated he just wants to go home. Pt states he usually goes to the [facility] for dialysis treatment, upset he was brought to [facility]. collateral obtained from supervisor, Yovana [phone], states pt has been noncompliant with meds for long time, not attending dialysis treatments. They are concerned pt has become more irritable, threatening staff at times, sometimes hitting staff. Pt has not reported SI, no psychosis, fili.  SAD, schizophrenia  pt poor historian unable to verify history (19 Dec 2023 20:56)    Hospital Course: Behavorial health team was consulted recommending continue depakote, trazodone and f/u with outpt psychiatrist Dr. Toledo at [facility]. Nephrology was consult for HAYLEY in the setting of uncontrolled hypertension and BPH. On [facility] [place], no prior labs available. Admission Scr was 4.46 with eGFR of 13 on 12/19/23. UA unremarkable and ultrasound with no obstruction. Per nephro - at present there is no emergency indication for dialysis. Avoid nephrotoxins.   Discharge/Dispo/Med rec discussed with attending  ____. Patient medically cleared for discharge ____ with outpatient follow up     Important Medication Changes and Reason:    Active or Pending Issues Requiring Follow-up:    Advanced Directives:   [ ] Full code  [ ] DNR  [ ] Hospice    Discharge Diagnoses:         HPI:  Pt is a 79 y/o male, hx Schizophrenia, dementia, in current psych tx at nursing home [facility] Dr. Dupree, on depakote , trazodone, bib ems for agitation at senior living, was threatening to hurt staff with bat. Pt poor historian, talking very loudly at baseline, yelling. Pt denies being agitated at the nursing home, denies threatening staff. Pt denies depression, anxiety, fili, psychosis. pt reports fair sleep, appetite. pt repeatedly stated he just wants to go home. Pt states he usually goes to the [facility] for dialysis treatment, upset he was brought to [facility]. collateral obtained from supervisor, Yovana [phone], states pt has been noncompliant with meds for long time, not attending dialysis treatments. They are concerned pt has become more irritable, threatening staff at times, sometimes hitting staff. Pt has not reported SI, no psychosis, fili.  SAD, schizophrenia  pt poor historian unable to verify history (19 Dec 2023 20:56)    Hospital Course: Behavorial health team was consulted recommending continue depakote, trazodone and f/u with outpt psychiatrist Dr. Toledo at [facility]. Nephrology was consult for HAYLEY in the setting of uncontrolled hypertension and BPH. On [facility] [place], no prior labs available. Admission Scr was 4.46 with eGFR of 13 on 12/19/23. UA unremarkable and ultrasound with no obstruction. Per nephro - at present there is no emergency indication for dialysis. Avoid nephrotoxins.   Discharge/Dispo/Med rec discussed with attending  ____. Patient medically cleared for discharge ____ with outpatient follow up     Important Medication Changes and Reason:    Active or Pending Issues Requiring Follow-up:  Renal  Psych    Advanced Directives:   [ x] Full code  [ ] DNR  [ ] Hospice    Discharge Diagnoses:  Agitation/Schizophrenia  ESRD         HPI:  Pt is a 77 y/o male, hx Schizophrenia, dementia, in current psych tx at nursing home [facility] Dr. Dupree, on depakote , trazodone, bib ems for agitation at halfway, was threatening to hurt staff with bat. Pt poor historian, talking very loudly at baseline, yelling. Pt denies being agitated at the nursing home, denies threatening staff. Pt denies depression, anxiety, fili, psychosis. pt reports fair sleep, appetite. pt repeatedly stated he just wants to go home. Pt states he usually goes to the [facility] for dialysis treatment, upset he was brought to [facility]. collateral obtained from supervisor, Yovana [phone], states pt has been noncompliant with meds for long time, not attending dialysis treatments. They are concerned pt has become more irritable, threatening staff at times, sometimes hitting staff. Pt has not reported SI, no psychosis, fili.  SAD, schizophrenia  pt poor historian unable to verify history (19 Dec 2023 20:56)    Hospital Course: Behavorial health team was consulted recommending continue depakote, trazodone and f/u with outpt psychiatrist Dr. Toledo at [facility]. Nephrology was consult for HAYLEY in the setting of uncontrolled hypertension and BPH. [facility] [place], no prior labs available. Admission Scr was 4.46 with eGFR of 13 on 12/19/23. UA unremarkable and ultrasound with no obstruction. Per nephro - at present there is no emergency indication for dialysis. Avoid nephrotoxins.   Discharge/Dispo/Med rec discussed with attending  ____. Patient medically cleared for discharge ____ with outpatient follow up     Important Medication Changes and Reason:    Active or Pending Issues Requiring Follow-up:  Renal  Psych    Advanced Directives:   [ x] Full code  [ ] DNR  [ ] Hospice    Discharge Diagnoses:  Agitation/Schizophrenia  ESRD         HPI:  Pt is a 79 y/o male, hx Schizophrenia, dementia, in current psych tx at nursing home [facility] Dr. Dupree, on depakote , trazodone, bib ems for agitation at correction, was threatening to hurt staff with bat. Pt poor historian, talking very loudly at baseline, yelling. Pt denies being agitated at the nursing home, denies threatening staff. Pt denies depression, anxiety, fili, psychosis. pt reports fair sleep, appetite. pt repeatedly stated he just wants to go home. Pt states he usually goes to the [facility] for dialysis treatment, upset he was brought to [facility]. collateral obtained from supervisor, Yovana [phone], states pt has been noncompliant with meds for long time, not attending dialysis treatments. They are concerned pt has become more irritable, threatening staff at times, sometimes hitting staff. Pt has not reported SI, no psychosis, fili.  SAD, schizophrenia  pt poor historian unable to verify history (19 Dec 2023 20:56)    Hospital Course: Behavorial health team was consulted recommending continue depakote, trazodone and f/u with outpt psychiatrist Dr. Toledo at [facility]. Nephrology was consult for HAYLEY in the setting of uncontrolled hypertension and BPH. On review of labs on [place] HIE, no prior labs available. Admission Scr was 4.46 with eGFR of 13 on 12/19/23. UA unremarkable and ultrasound with no obstruction. Per nephro - at present there is no emergency indication for dialysis. Avoid nephrotoxins.   Discharge/Dispo/Med rec discussed with attending Dr. Granger. Patient medically cleared for discharge  with outpatient follow up .Dispo/Disch/Med rec  DALTON Granger    Important Medication Changes and Reason:    Active or Pending Issues Requiring Follow-up:  Renal  Psych    Advanced Directives:   [ x] Full code  [ ] DNR  [ ] Hospice    Discharge Diagnoses:  Agitation/Schizophrenia  ESRD         HPI:  Pt is a 77 y/o male, hx Schizophrenia, dementia, in current psych tx at nursing home [facility] Dr. Dupree, on depakote , trazodone, bib ems for agitation at prison, was threatening to hurt staff with bat. Pt poor historian, talking very loudly at baseline, yelling. Pt denies being agitated at the nursing home, denies threatening staff. Pt denies depression, anxiety, fili, psychosis. pt reports fair sleep, appetite. pt repeatedly stated he just wants to go home. Pt states he usually goes to the [facility] for dialysis treatment, upset he was brought to [facility]. collateral obtained from supervisor, Yovana [phone], states pt has been noncompliant with meds for long time, not attending dialysis treatments. They are concerned pt has become more irritable, threatening staff at times, sometimes hitting staff. Pt has not reported SI, no psychosis, fili.  SAD, schizophrenia  pt poor historian unable to verify history (19 Dec 2023 20:56)    Hospital Course: Behavorial health team was consulted recommending continue depakote, trazodone and f/u with outpt psychiatrist Dr. Toledo at [facility]. Nephrology was consult for HAYLEY in the setting of uncontrolled hypertension and BPH. On review of labs on [place] HIE, no prior labs available. Admission Scr was 4.46 with eGFR of 13 on 12/19/23. UA unremarkable and ultrasound with no obstruction. Per nephro - at present there is no emergency indication for dialysis. Avoid nephrotoxins.   Discharge/Dispo/Med rec discussed with attending Dr. Granger. Patient medically cleared for discharge  with outpatient follow up .Dispo/Disch/Med rec  DALTON Granger    Important Medication Changes and Reason:    Active or Pending Issues Requiring Follow-up:  Renal  Psych    Advanced Directives:   [ x] Full code  [ ] DNR  [ ] Hospice    Discharge Diagnoses:  Agitation/Schizophrenia  ESRD

## 2023-12-21 NOTE — DISCHARGE NOTE PROVIDER - PROVIDER TOKENS
PROVIDER:[TOKEN:[[id number]],FOLLOWUP:[1 week]] PROVIDER:[TOKEN:[[id number]],FOLLOWUP:[1 week]]

## 2023-12-21 NOTE — PROGRESS NOTE ADULT - SUBJECTIVE AND OBJECTIVE BOX
[facility] Division of Kidney Diseases & Hypertension  FOLLOW UP NOTE  --------------------------------------------------------------------------------  Chief Complaint: Agitation    24 hour events/subjective: Patient seen and examined this morning agitated but more conversants reports he was being evaluated for dialysis but has not started yet.  Patient was very distracted yelling about his cologne.  Unable to obtain meaningful review of systems.    PAST HISTORY  --------------------------------------------------------------------------------  No significant changes to PMH, PSH, FHx, SHx, unless otherwise noted    ALLERGIES & MEDICATIONS  --------------------------------------------------------------------------------  Allergies    Allergy Status Unknown    Intolerances      Standing Inpatient Medications  chlorhexidine 2% Cloths 1 Application(s) Topical daily  clopidogrel Tablet 75 milliGRAM(s) Oral daily  dextrose 5%. 1000 milliLiter(s) IV Continuous <Continuous>  dextrose 5%. 1000 milliLiter(s) IV Continuous <Continuous>  dextrose 50% Injectable 25 Gram(s) IV Push once  dextrose 50% Injectable 12.5 Gram(s) IV Push once  dextrose 50% Injectable 25 Gram(s) IV Push once  divalproex  milliGRAM(s) Oral two times a day  finasteride 5 milliGRAM(s) Oral daily  glucagon  Injectable 1 milliGRAM(s) IntraMuscular once  hydrALAZINE 25 milliGRAM(s) Oral every 8 hours  insulin glargine Injectable (LANTUS) 8 Unit(s) SubCutaneous at bedtime  insulin lispro (ADMELOG) corrective regimen sliding scale   SubCutaneous three times a day before meals  isosorbide   mononitrate ER Tablet (IMDUR) 30 milliGRAM(s) Oral daily  metoprolol tartrate 100 milliGRAM(s) Oral daily  tamsulosin 0.4 milliGRAM(s) Oral at bedtime  traZODone 100 milliGRAM(s) Oral at bedtime    PRN Inpatient Medications  dextrose Oral Gel 15 Gram(s) Oral once PRN      REVIEW OF SYSTEMS  --------------------------------------------------------------------------------  limited as above    VITALS/PHYSICAL EXAM  --------------------------------------------------------------------------------  T(C): 36.2 (12-20-23 @ 18:13), Max: 36.3 (12-20-23 @ 08:58)  HR: 69 (12-21-23 @ 05:29) (57 - 73)  BP: 163/70 (12-21-23 @ 05:29) (138/64 - 183/96)  ABP: --  ABP(mean): --  RR: 18 (12-21-23 @ 05:29) (18 - 18)  SpO2: 99% (12-21-23 @ 05:29) (98% - 99%)  CVP(mm Hg): --  Height (cm): 175.3 (12-19-23 @ 13:29)  Weight (kg): 81.6 (12-19-23 @ 13:29)  BMI (kg/m2): 26.6 (12-19-23 @ 13:29)  BSA (m2): 1.98 (12-19-23 @ 13:29)      Physical Exam:  	Gen: no distress  	HEENT: anicteric  	Pulm: CTA B/L  	CV: S1 and S2  	Abd: soft  	: no houser  	LE: no edema  	Psych: angry affect  	Skin: dry no catheters, no fistulas noted    LABS/STUDIES  --------------------------------------------------------------------------------              9.6    4.09  >-----------<  159      [12-19-23 @ 15:21]              29.4     139  |  105  |  75  ----------------------------<  59      [12-19-23 @ 15:21]  4.4   |  22  |  4.46        Ca     8.8     [12-19-23 @ 15:21]    TPro  7.2  /  Alb  4.1  /  TBili  0.2  /  DBili  x   /  AST  21  /  ALT  11  /  AlkPhos  96  [12-19-23 @ 15:21]          Creatinine Trend:  SCr 4.46 [12-19 @ 15:21]    Urinalysis - [12-19-23 @ 15:51]      Color Yellow / Appearance Clear / SG 1.009 / pH 6.5      Gluc Negative / Ketone Negative  / Bili Negative / Urobili 0.2       Blood Trace / Protein 30 / Leuk Est Negative / Nitrite Negative      RBC 0 / WBC 1 / Hyaline  / Gran  / Sq Epi  / Non Sq Epi 0 / Bacteria Negative      TSH 2.59      [12-19-23 @ 15:21]       [facility] Division of Kidney Diseases & Hypertension  FOLLOW UP NOTE  --------------------------------------------------------------------------------  Chief Complaint: Agitation    24 hour events/subjective: Patient seen and examined this morning agitated but more conversants reports he was being evaluated for dialysis but has not started yet.  Patient was very distracted yelling about his cologne.  Unable to obtain meaningful review of systems.    PAST HISTORY  --------------------------------------------------------------------------------  No significant changes to PMH, PSH, FHx, SHx, unless otherwise noted    ALLERGIES & MEDICATIONS  --------------------------------------------------------------------------------  Allergies    Allergy Status Unknown    Intolerances      Standing Inpatient Medications  chlorhexidine 2% Cloths 1 Application(s) Topical daily  clopidogrel Tablet 75 milliGRAM(s) Oral daily  dextrose 5%. 1000 milliLiter(s) IV Continuous <Continuous>  dextrose 5%. 1000 milliLiter(s) IV Continuous <Continuous>  dextrose 50% Injectable 25 Gram(s) IV Push once  dextrose 50% Injectable 12.5 Gram(s) IV Push once  dextrose 50% Injectable 25 Gram(s) IV Push once  divalproex  milliGRAM(s) Oral two times a day  finasteride 5 milliGRAM(s) Oral daily  glucagon  Injectable 1 milliGRAM(s) IntraMuscular once  hydrALAZINE 25 milliGRAM(s) Oral every 8 hours  insulin glargine Injectable (LANTUS) 8 Unit(s) SubCutaneous at bedtime  insulin lispro (ADMELOG) corrective regimen sliding scale   SubCutaneous three times a day before meals  isosorbide   mononitrate ER Tablet (IMDUR) 30 milliGRAM(s) Oral daily  metoprolol tartrate 100 milliGRAM(s) Oral daily  tamsulosin 0.4 milliGRAM(s) Oral at bedtime  traZODone 100 milliGRAM(s) Oral at bedtime    PRN Inpatient Medications  dextrose Oral Gel 15 Gram(s) Oral once PRN      REVIEW OF SYSTEMS  --------------------------------------------------------------------------------  limited as above    VITALS/PHYSICAL EXAM  --------------------------------------------------------------------------------  T(C): 36.2 (12-20-23 @ 18:13), Max: 36.3 (12-20-23 @ 08:58)  HR: 69 (12-21-23 @ 05:29) (57 - 73)  BP: 163/70 (12-21-23 @ 05:29) (138/64 - 183/96)  ABP: --  ABP(mean): --  RR: 18 (12-21-23 @ 05:29) (18 - 18)  SpO2: 99% (12-21-23 @ 05:29) (98% - 99%)  CVP(mm Hg): --  Height (cm): 175.3 (12-19-23 @ 13:29)  Weight (kg): 81.6 (12-19-23 @ 13:29)  BMI (kg/m2): 26.6 (12-19-23 @ 13:29)  BSA (m2): 1.98 (12-19-23 @ 13:29)      Physical Exam:  	Gen: no distress  	HEENT: anicteric  	Pulm: CTA B/L  	CV: S1 and S2  	Abd: soft  	: no houser  	LE: no edema  	Psych: angry affect  	Skin: dry no catheters, no fistulas noted    LABS/STUDIES  --------------------------------------------------------------------------------              9.6    4.09  >-----------<  159      [12-19-23 @ 15:21]              29.4     139  |  105  |  75  ----------------------------<  59      [12-19-23 @ 15:21]  4.4   |  22  |  4.46        Ca     8.8     [12-19-23 @ 15:21]    TPro  7.2  /  Alb  4.1  /  TBili  0.2  /  DBili  x   /  AST  21  /  ALT  11  /  AlkPhos  96  [12-19-23 @ 15:21]          Creatinine Trend:  SCr 4.46 [12-19 @ 15:21]    Urinalysis - [12-19-23 @ 15:51]      Color Yellow / Appearance Clear / SG 1.009 / pH 6.5      Gluc Negative / Ketone Negative  / Bili Negative / Urobili 0.2       Blood Trace / Protein 30 / Leuk Est Negative / Nitrite Negative      RBC 0 / WBC 1 / Hyaline  / Gran  / Sq Epi  / Non Sq Epi 0 / Bacteria Negative      TSH 2.59      [12-19-23 @ 15:21]

## 2023-12-21 NOTE — DISCHARGE NOTE PROVIDER - NSDCMRMEDTOKEN_GEN_ALL_CORE_FT
acetaminophen 325 mg oral tablet: 2 tab(s) orally every 12 hours as needed  Artificial Tears ophthalmic solution: 1 drop(s) in the right eye 2 times a day  clopidogrel 75 mg oral tablet: 1 tab(s) orally once a day  Depakote 500 mg oral delayed release tablet: 1 tab(s) orally 2 times a day  DuoNeb 0.5 mg-2.5 mg/3 mL inhalation solution: 3 milliliter(s) by nebulizer every 6 hours  ferrous sulfate 325 mg (65 mg elemental iron) oral tablet: 1 tab(s) orally once a day (in the evening)  finasteride 5 mg oral tablet: 1 tab(s) orally once a day  glipiZIDE 5 mg oral tablet: 1 tab(s) orally once a day  hydrALAZINE 25 mg oral tablet: 1 tab(s) orally every 8 hours  isosorbide mononitrate 30 mg oral tablet, extended release: 1 tab(s) orally once a day  Lac-Hydrin 12% topical cream: Apply topically to affected area 2 times a day  Lantus 100 units/mL subcutaneous solution: 10 unit(s) subcutaneous once a day (at bedtime)  melatonin 10 mg oral tablet: 1 tab(s) orally once a day (at bedtime)  metoprolol tartrate 100 mg oral tablet: 1 tab(s) orally once a day  Multiple Vitamins oral tablet: 1 tab(s) orally once a day  Senna S 50 mg-8.6 mg oral tablet: 1 tab(s) orally once a day as needed  tamsulosin 0.4 mg oral capsule: 1 cap(s) orally once a day (at bedtime)  traZODone 100 mg oral tablet: 1 tab(s) orally once a day (at bedtime)   clopidogrel 75 mg oral tablet: 1 tab(s) orally once a day  Depakote 500 mg oral delayed release tablet: 1 tab(s) orally 2 times a day  ferrous sulfate 325 mg (65 mg elemental iron) oral tablet: 1 tab(s) orally once a day (in the evening)  finasteride 5 mg oral tablet: 1 tab(s) orally once a day  glipiZIDE 5 mg oral tablet: 1 tab(s) orally once a day  haloperidol 2 mg oral tablet: 1 tab(s) orally once a day (at bedtime)  hydrALAZINE 25 mg oral tablet: 1 tab(s) orally every 8 hours  isosorbide mononitrate 30 mg oral tablet, extended release: 1 tab(s) orally once a day  Lantus 100 units/mL subcutaneous solution: 10 unit(s) subcutaneous once a day (at bedtime)  metoprolol tartrate 100 mg oral tablet: 1 tab(s) orally once a day  Multiple Vitamins oral tablet: 1 tab(s) orally once a day  ocular lubricant ophthalmic solution: 1 drop(s) to each affected eye 4 times a day As needed Dry Eyes  Senna S 50 mg-8.6 mg oral tablet: 1 tab(s) orally once a day as needed  sodium bicarbonate 650 mg oral tablet: 1 tab(s) orally 3 times a day  tamsulosin 0.4 mg oral capsule: 1 cap(s) orally once a day (at bedtime)  traZODone 100 mg oral tablet: 1 tab(s) orally once a day (at bedtime)

## 2023-12-21 NOTE — DISCHARGE NOTE PROVIDER - NSDCQMSTROKE_NEU_ALL_CORE
Goal Outcome Evaluation:           Progress: improving  Outcome Evaluation: Pt resting in bed w/ spouse at bedside assisting w/ care. AOx4, VSS, some complaints of headache that was relieved w/ repositioing, otherwise no s/s of acute distress noted. Wound care performed w/ pt tolerating well. IV access and telemetry monitoring patent and maintained, will continue to monitor.   No

## 2023-12-21 NOTE — DIETITIAN INITIAL EVALUATION ADULT - ADD RECOMMEND
1) Recommend liberalizing diet to regular diet to optimize PO intake and offer more meal selections   2) RD to add diet mighty shakes TID + Magic cups TID  3) Monitor and encourage PO intake. Encourage use of daily menus. Honor dietary preferences as expressed as able.   4) Monitor weights, labs, hydration status, bowels, and skin integrity.

## 2023-12-21 NOTE — DISCHARGE NOTE PROVIDER - CARE PROVIDER_API CALL
Tena Dupree  Psychiatry  [address] [place] [id number]  Phone: [phone]  Fax: [phone]  Follow Up Time: 1 week   Tena Dupree  Psychiatry  [address] [place] [id number]  Phone: [phone]  Fax: [phone]  Follow Up Time: 1 week

## 2023-12-21 NOTE — DIETITIAN INITIAL EVALUATION ADULT - NS FNS DIET ORDER
Diet, Renal Restrictions:   For patients receiving Renal Replacement - No Protein Restr, No Conc K, No Conc Phos, Low Sodium (12-19-23 @ 21:16) [Active]

## 2023-12-21 NOTE — DIETITIAN INITIAL EVALUATION ADULT - PERTINENT MEDS FT
MEDICATIONS  (STANDING):  chlorhexidine 2% Cloths 1 Application(s) Topical daily  clopidogrel Tablet 75 milliGRAM(s) Oral daily  dextrose 5%. 1000 milliLiter(s) (50 mL/Hr) IV Continuous <Continuous>  dextrose 5%. 1000 milliLiter(s) (100 mL/Hr) IV Continuous <Continuous>  dextrose 50% Injectable 25 Gram(s) IV Push once  dextrose 50% Injectable 12.5 Gram(s) IV Push once  dextrose 50% Injectable 25 Gram(s) IV Push once  divalproex  milliGRAM(s) Oral two times a day  finasteride 5 milliGRAM(s) Oral daily  glucagon  Injectable 1 milliGRAM(s) IntraMuscular once  hydrALAZINE 25 milliGRAM(s) Oral every 8 hours  insulin glargine Injectable (LANTUS) 8 Unit(s) SubCutaneous at bedtime  insulin lispro (ADMELOG) corrective regimen sliding scale   SubCutaneous three times a day before meals  isosorbide   mononitrate ER Tablet (IMDUR) 30 milliGRAM(s) Oral daily  metoprolol tartrate 100 milliGRAM(s) Oral daily  tamsulosin 0.4 milliGRAM(s) Oral at bedtime  traZODone 100 milliGRAM(s) Oral at bedtime    MEDICATIONS  (PRN):  dextrose Oral Gel 15 Gram(s) Oral once PRN Blood Glucose LESS THAN 70 milliGRAM(s)/deciliter

## 2023-12-21 NOTE — DIETITIAN INITIAL EVALUATION ADULT - PERTINENT LABORATORY DATA
12-19    139  |  105  |  75<H>  ----------------------------<  59<L>  4.4   |  22  |  4.46<H>    Ca    8.8      19 Dec 2023 15:21    TPro  7.2  /  Alb  4.1  /  TBili  0.2  /  DBili  x   /  AST  21  /  ALT  11  /  AlkPhos  96  12-19  POCT Blood Glucose.: 112 mg/dL (12-21-23 @ 08:02)

## 2023-12-21 NOTE — DIETITIAN INITIAL EVALUATION ADULT - PHYSCIAL ASSESSMENT
Weight Hx Per:  - Source: transfer records, electronic medical record   - UBW: unknown    - Reported weight changes: none     Weight Hx Per [facility] HIE: none noted     Current Admission Weights:  - Dosing weight: 179.1 pounds/81.6 kg (12/19)    Weight Change:  - none     **  Will continue to monitor weight trends as available/able.     IBW: 160 pounds   %IBW: 112% Weight Hx Per:  - Source: transfer records, electronic medical record   - UBW: unknown    - Reported weight changes: none     Weight Hx Per [facility] HIE: none noted     Current Admission Weights:  - Dosing weight: 179.1 pounds/81.6 kg (12/19)    Weight Change:  - none     **  Will continue to monitor weight trends as available/able.     IBW: 160 pounds   %IBW: 112%

## 2023-12-21 NOTE — DIETITIAN INITIAL EVALUATION ADULT - OTHER INFO
"Chief Complaint   Patient presents with     Well Child     13 year     Pre Visit Planning - Done       Initial /81 (BP Location: Left arm, Patient Position: Chair, Cuff Size: Adult Regular)  Pulse 95  Temp 97.5  F (36.4  C) (Oral)  Ht 5' 3.5\" (1.613 m)  Wt 133 lb 2 oz (60.4 kg)  SpO2 98%  BMI 23.21 kg/m2 Estimated body mass index is 23.21 kg/(m^2) as calculated from the following:    Height as of this encounter: 5' 3.5\" (1.613 m).    Weight as of this encounter: 133 lb 2 oz (60.4 kg).  Medication Reconciliation: complete   Estee Luna MA      "
-Endo: BG being managed with Lantus 8u at bedtime and SSI.   -Renal: Pt with [facility]; nephrology following

## 2023-12-22 LAB
GLUCOSE BLDC GLUCOMTR-MCNC: 162 MG/DL — HIGH (ref 70–99)
GLUCOSE BLDC GLUCOMTR-MCNC: 162 MG/DL — HIGH (ref 70–99)
GLUCOSE BLDC GLUCOMTR-MCNC: 166 MG/DL — HIGH (ref 70–99)
GLUCOSE BLDC GLUCOMTR-MCNC: 166 MG/DL — HIGH (ref 70–99)
GLUCOSE BLDC GLUCOMTR-MCNC: 216 MG/DL — HIGH (ref 70–99)
GLUCOSE BLDC GLUCOMTR-MCNC: 216 MG/DL — HIGH (ref 70–99)
GLUCOSE BLDC GLUCOMTR-MCNC: 98 MG/DL — SIGNIFICANT CHANGE UP (ref 70–99)
GLUCOSE BLDC GLUCOMTR-MCNC: 98 MG/DL — SIGNIFICANT CHANGE UP (ref 70–99)

## 2023-12-22 PROCEDURE — 99232 SBSQ HOSP IP/OBS MODERATE 35: CPT

## 2023-12-22 PROCEDURE — 93010 ELECTROCARDIOGRAM REPORT: CPT

## 2023-12-22 RX ORDER — HALOPERIDOL DECANOATE 100 MG/ML
2 INJECTION INTRAMUSCULAR AT BEDTIME
Refills: 0 | Status: DISCONTINUED | OUTPATIENT
Start: 2023-12-22 | End: 2023-12-22

## 2023-12-22 RX ORDER — HALOPERIDOL DECANOATE 100 MG/ML
2 INJECTION INTRAMUSCULAR AT BEDTIME
Refills: 0 | Status: DISCONTINUED | OUTPATIENT
Start: 2023-12-22 | End: 2023-12-28

## 2023-12-22 RX ORDER — HALOPERIDOL DECANOATE 100 MG/ML
5 INJECTION INTRAMUSCULAR ONCE
Refills: 0 | Status: COMPLETED | OUTPATIENT
Start: 2023-12-22 | End: 2023-12-22

## 2023-12-22 RX ORDER — OLANZAPINE 15 MG/1
5 TABLET, FILM COATED ORAL ONCE
Refills: 0 | Status: DISCONTINUED | OUTPATIENT
Start: 2023-12-22 | End: 2023-12-22

## 2023-12-22 RX ADMIN — CLOPIDOGREL BISULFATE 75 MILLIGRAM(S): 75 TABLET, FILM COATED ORAL at 11:41

## 2023-12-22 RX ADMIN — Medication 25 MILLIGRAM(S): at 14:56

## 2023-12-22 RX ADMIN — Medication 25 MILLIGRAM(S): at 21:27

## 2023-12-22 RX ADMIN — DIVALPROEX SODIUM 500 MILLIGRAM(S): 500 TABLET, DELAYED RELEASE ORAL at 00:32

## 2023-12-22 RX ADMIN — INSULIN GLARGINE 8 UNIT(S): 100 INJECTION, SOLUTION SUBCUTANEOUS at 21:27

## 2023-12-22 RX ADMIN — FINASTERIDE 5 MILLIGRAM(S): 5 TABLET, FILM COATED ORAL at 11:41

## 2023-12-22 RX ADMIN — HALOPERIDOL DECANOATE 5 MILLIGRAM(S): 100 INJECTION INTRAMUSCULAR at 00:28

## 2023-12-22 RX ADMIN — Medication 100 MILLIGRAM(S): at 21:27

## 2023-12-22 RX ADMIN — Medication 2: at 12:23

## 2023-12-22 RX ADMIN — Medication 100 MILLIGRAM(S): at 06:04

## 2023-12-22 RX ADMIN — HALOPERIDOL DECANOATE 2 MILLIGRAM(S): 100 INJECTION INTRAMUSCULAR at 22:22

## 2023-12-22 RX ADMIN — DIVALPROEX SODIUM 500 MILLIGRAM(S): 500 TABLET, DELAYED RELEASE ORAL at 17:59

## 2023-12-22 RX ADMIN — TAMSULOSIN HYDROCHLORIDE 0.4 MILLIGRAM(S): 0.4 CAPSULE ORAL at 21:27

## 2023-12-22 RX ADMIN — Medication 25 MILLIGRAM(S): at 06:04

## 2023-12-22 RX ADMIN — ISOSORBIDE MONONITRATE 30 MILLIGRAM(S): 60 TABLET, EXTENDED RELEASE ORAL at 11:41

## 2023-12-22 NOTE — PROGRESS NOTE ADULT - PROBLEM SELECTOR PLAN 1
Pt with kidney failure in the setting of uncontrolled hypertension duration and etiology unknown at this time. On review of labs on [place], no prior labs available. Admission Scr was 4.46 with eGFR of 13 on 12/19/23. UA unremarkable and ultrasound with no obstruction.  No labs since 12/19.  At present there is no emergency indication for dialysis although would need labs to assess.  Primary team has been in touch with facility and patient corroborated that he is being "worked up" for dialysis but has not started.  Monitor labs and urine output. Avoid nephrotoxins. Dose medications as per eGFR. Pt with kidney failure in the setting of uncontrolled hypertension duration and etiology unknown at this time. On review of labs on [facility], no prior labs available. Admission Scr was 4.46 with eGFR of 13 on 12/19/23. UA unremarkable and ultrasound with no obstruction.  No labs since 12/19.  At present there is no emergency indication for dialysis although would need labs to assess.  Primary team has been in touch with facility and patient corroborated that he is being "worked up" for dialysis but has not started.  Monitor labs and urine output. Avoid nephrotoxins. Dose medications as per eGFR.

## 2023-12-22 NOTE — BH CONSULTATION LIAISON PROGRESS NOTE - NSBHCONSULTFOLLOWAFTERCARE_PSY_A_CORE FT
f/u with outpt psychiarist Dr. Toledo at [facility]  f/u with outpt psychiarist Dr. Toledo at [facility]

## 2023-12-22 NOTE — PROGRESS NOTE ADULT - SUBJECTIVE AND OBJECTIVE BOX
[facility] Division of Kidney Diseases & Hypertension  FOLLOW UP NOTE  --------------------------------------------------------------------------------  Chief Complaint: elevated creatinine    24 hour events/subjective: Patient seen and evaluated this morning.  Overnight events noted code gray called.  He is very angry and upset today and not cooperative with review of systems keeps shouting that he wants to go back to "his" hospital.  allowed exam.        PAST HISTORY  --------------------------------------------------------------------------------  No significant changes to PMH, PSH, FHx, SHx, unless otherwise noted    ALLERGIES & MEDICATIONS  --------------------------------------------------------------------------------  Allergies    Allergy Status Unknown    Intolerances      Standing Inpatient Medications  chlorhexidine 2% Cloths 1 Application(s) Topical daily  clopidogrel Tablet 75 milliGRAM(s) Oral daily  dextrose 5%. 1000 milliLiter(s) IV Continuous <Continuous>  dextrose 5%. 1000 milliLiter(s) IV Continuous <Continuous>  dextrose 50% Injectable 25 Gram(s) IV Push once  dextrose 50% Injectable 25 Gram(s) IV Push once  dextrose 50% Injectable 12.5 Gram(s) IV Push once  divalproex  milliGRAM(s) Oral two times a day  finasteride 5 milliGRAM(s) Oral daily  glucagon  Injectable 1 milliGRAM(s) IntraMuscular once  hydrALAZINE 25 milliGRAM(s) Oral every 8 hours  insulin glargine Injectable (LANTUS) 8 Unit(s) SubCutaneous at bedtime  insulin lispro (ADMELOG) corrective regimen sliding scale   SubCutaneous three times a day before meals  isosorbide   mononitrate ER Tablet (IMDUR) 30 milliGRAM(s) Oral daily  metoprolol tartrate 100 milliGRAM(s) Oral daily  tamsulosin 0.4 milliGRAM(s) Oral at bedtime  traZODone 100 milliGRAM(s) Oral at bedtime    PRN Inpatient Medications  dextrose Oral Gel 15 Gram(s) Oral once PRN      REVIEW OF SYSTEMS  --------------------------------------------------------------------------------  unable to obtain due to patient's agitation    VITALS/PHYSICAL EXAM  --------------------------------------------------------------------------------  T(C): 36.4 (12-21-23 @ 23:50), Max: 36.4 (12-21-23 @ 23:50)  HR: 78 (12-21-23 @ 23:50) (69 - 78)  BP: 158/74 (12-21-23 @ 23:50) (139/65 - 158/74)  ABP: --  ABP(mean): --  RR: 18 (12-21-23 @ 23:50) (18 - 18)  SpO2: 100% (12-21-23 @ 23:50) (100% - 100%)  CVP(mm Hg): --        12-21-23 @ 07:01  -  12-22-23 @ 06:58  --------------------------------------------------------  IN: 0 mL / OUT: 500 mL / NET: -500 mL    Physical Exam:  	Gen: no distress  	HEENT: anicteric  	Pulm: CTA B/L  	CV: S1 and S2  	Abd: soft  	: no houser  	LE: no edema  	Psych: angry affect  	Skin: dry no catheters, no fistulas noted    LABS/STUDIES  --------------------------------------------------------------------------------                Creatinine Trend:  SCr 4.46 [12-19 @ 15:21]    Urinalysis - [12-19-23 @ 15:51]      Color Yellow / Appearance Clear / SG 1.009 / pH 6.5      Gluc Negative / Ketone Negative  / Bili Negative / Urobili 0.2       Blood Trace / Protein 30 / Leuk Est Negative / Nitrite Negative      RBC 0 / WBC 1 / Hyaline  / Gran  / Sq Epi  / Non Sq Epi 0 / Bacteria Negative      TSH 2.59      [12-19-23 @ 15:21]       [facility] Division of Kidney Diseases & Hypertension  FOLLOW UP NOTE  --------------------------------------------------------------------------------  Chief Complaint: elevated creatinine    24 hour events/subjective: Patient seen and evaluated this morning.  Overnight events noted code gray called.  He is very angry and upset today and not cooperative with review of systems keeps shouting that he wants to go back to "his" hospital.  allowed exam.        PAST HISTORY  --------------------------------------------------------------------------------  No significant changes to PMH, PSH, FHx, SHx, unless otherwise noted    ALLERGIES & MEDICATIONS  --------------------------------------------------------------------------------  Allergies    Allergy Status Unknown    Intolerances      Standing Inpatient Medications  chlorhexidine 2% Cloths 1 Application(s) Topical daily  clopidogrel Tablet 75 milliGRAM(s) Oral daily  dextrose 5%. 1000 milliLiter(s) IV Continuous <Continuous>  dextrose 5%. 1000 milliLiter(s) IV Continuous <Continuous>  dextrose 50% Injectable 25 Gram(s) IV Push once  dextrose 50% Injectable 25 Gram(s) IV Push once  dextrose 50% Injectable 12.5 Gram(s) IV Push once  divalproex  milliGRAM(s) Oral two times a day  finasteride 5 milliGRAM(s) Oral daily  glucagon  Injectable 1 milliGRAM(s) IntraMuscular once  hydrALAZINE 25 milliGRAM(s) Oral every 8 hours  insulin glargine Injectable (LANTUS) 8 Unit(s) SubCutaneous at bedtime  insulin lispro (ADMELOG) corrective regimen sliding scale   SubCutaneous three times a day before meals  isosorbide   mononitrate ER Tablet (IMDUR) 30 milliGRAM(s) Oral daily  metoprolol tartrate 100 milliGRAM(s) Oral daily  tamsulosin 0.4 milliGRAM(s) Oral at bedtime  traZODone 100 milliGRAM(s) Oral at bedtime    PRN Inpatient Medications  dextrose Oral Gel 15 Gram(s) Oral once PRN      REVIEW OF SYSTEMS  --------------------------------------------------------------------------------  unable to obtain due to patient's agitation    VITALS/PHYSICAL EXAM  --------------------------------------------------------------------------------  T(C): 36.4 (12-21-23 @ 23:50), Max: 36.4 (12-21-23 @ 23:50)  HR: 78 (12-21-23 @ 23:50) (69 - 78)  BP: 158/74 (12-21-23 @ 23:50) (139/65 - 158/74)  ABP: --  ABP(mean): --  RR: 18 (12-21-23 @ 23:50) (18 - 18)  SpO2: 100% (12-21-23 @ 23:50) (100% - 100%)  CVP(mm Hg): --        12-21-23 @ 07:01  -  12-22-23 @ 06:58  --------------------------------------------------------  IN: 0 mL / OUT: 500 mL / NET: -500 mL    Physical Exam:  	Gen: no distress  	HEENT: anicteric  	Pulm: CTA B/L  	CV: S1 and S2  	Abd: soft  	: no houser  	LE: no edema  	Psych: angry affect  	Skin: dry no catheters, no fistulas noted    LABS/STUDIES  --------------------------------------------------------------------------------                Creatinine Trend:  SCr 4.46 [12-19 @ 15:21]    Urinalysis - [12-19-23 @ 15:51]      Color Yellow / Appearance Clear / SG 1.009 / pH 6.5      Gluc Negative / Ketone Negative  / Bili Negative / Urobili 0.2       Blood Trace / Protein 30 / Leuk Est Negative / Nitrite Negative      RBC 0 / WBC 1 / Hyaline  / Gran  / Sq Epi  / Non Sq Epi 0 / Bacteria Negative      TSH 2.59      [12-19-23 @ 15:21]

## 2023-12-22 NOTE — PROGRESS NOTE ADULT - SUBJECTIVE AND OBJECTIVE BOX
MILTON ARIAS  78y Male  MRN:[id number]    Patient is a 78y old  Male who presents with a chief complaint of   HPI:  Pt is a 79 y/o male, hx Schizophrenia, dementia, in current psych tx at [facility] Dr. Dupree, on depakote , trazodone, bib [facility], was threatening to hurt staff with bat. Pt poor historian, talking very loudly at baseline, yelling. Pt denies being agitated at the nursing home, denies threatening staff. Pt denies depression, anxiety, fili, psychosis. pt reports fair sleep, appetite. pt repeatedly stated he just wants to go home. Pt states he usually goes to the [facility] for dialysis treatment, upset he was brought to [facility]. collateral obtained from supervisor, Yovana [phone], states pt has been noncompliant with meds for long time, not attending dialysis treatmetns. They are concenred pt has become more irritable, threatenign staff at times, sometimes hitting staff. Pt has not reported SI, no psychosis, fili.  SAD, schizophrenia  pt poor historian unable to verify history (19 Dec 2023 20:56)      Patient seen and evaluated at bedside. No acute events overnight except as noted.    Interval HPI: overnight events noted     PAST MEDICAL & SURGICAL HISTORY:  ESRD on dialysis      Schizophrenia          REVIEW OF SYSTEMS:  as per hpi       VITALS:   Vital Signs Last 24 Hrs  T(C): 36.4 (21 Dec 2023 23:50), Max: 36.4 (21 Dec 2023 23:50)  T(F): 97.6 (21 Dec 2023 23:50), Max: 97.6 (21 Dec 2023 23:50)  HR: 78 (21 Dec 2023 23:50) (69 - 78)  BP: 158/74 (21 Dec 2023 23:50) (139/65 - 158/74)  BP(mean): --  RR: 18 (21 Dec 2023 23:50) (18 - 18)  SpO2: 100% (21 Dec 2023 23:50) (100% - 100%)    Parameters below as of 21 Dec 2023 23:50  Patient On (Oxygen Delivery Method): room air        PHYSICAL EXAM:  GENERAL: NAD, well-developed,    HEAD:  Atraumatic, Normocephalic  EYES: EOMI, PERRLA, conjunctiva and sclera clear  NECK: Supple, No JVD  CHEST/LUNG: Clear to auscultation bilaterally; No wheeze  HEART: S1, S2; No murmurs, rubs, or gallops  ABDOMEN: Soft, Nontender, Nondistended; Bowel sounds present  EXTREMITIES:  2+ Peripheral Pulses, No clubbing, cyanosis, or edema  PSYCH: Normal affect  NEUROLOGY: AAOX2-3; non-focal  SKIN: No rashes or lesions    Consultant(s) Notes Reviewed:  [x ] YES  [ ] NO  Care Discussed with Consultants/Other Providers [ x] YES  [ ] NO    MEDS:   MEDICATIONS  (STANDING):  chlorhexidine 2% Cloths 1 Application(s) Topical daily  clopidogrel Tablet 75 milliGRAM(s) Oral daily  dextrose 5%. 1000 milliLiter(s) (50 mL/Hr) IV Continuous <Continuous>  dextrose 5%. 1000 milliLiter(s) (100 mL/Hr) IV Continuous <Continuous>  dextrose 50% Injectable 25 Gram(s) IV Push once  dextrose 50% Injectable 25 Gram(s) IV Push once  dextrose 50% Injectable 12.5 Gram(s) IV Push once  divalproex  milliGRAM(s) Oral two times a day  finasteride 5 milliGRAM(s) Oral daily  glucagon  Injectable 1 milliGRAM(s) IntraMuscular once  hydrALAZINE 25 milliGRAM(s) Oral every 8 hours  insulin glargine Injectable (LANTUS) 8 Unit(s) SubCutaneous at bedtime  insulin lispro (ADMELOG) corrective regimen sliding scale   SubCutaneous three times a day before meals  isosorbide   mononitrate ER Tablet (IMDUR) 30 milliGRAM(s) Oral daily  metoprolol tartrate 100 milliGRAM(s) Oral daily  tamsulosin 0.4 milliGRAM(s) Oral at bedtime  traZODone 100 milliGRAM(s) Oral at bedtime    MEDICATIONS  (PRN):  dextrose Oral Gel 15 Gram(s) Oral once PRN Blood Glucose LESS THAN 70 milliGRAM(s)/deciliter        ALLERGIES:  Allergy Status Unknown      LABS:       TSH: Thyroid Stimulating Hormone, Serum: 2.59 uIU/mL (12-19 @ 15:21)     < from: US Kidney and Bladder (12.19.23 @ 23:39) >  IMPRESSION:  1.  No hydronephrosis.  2.  Increased echogenicity compatible with medical renal disease.  3.  Layering debris within the urinary bladder. Correlate with urinalysis.    --- End of Report ---        < end of copied text >   MILTON ARIAS  78y Male  MRN:[id number]    Patient is a 78y old  Male who presents with a chief complaint of   HPI:  Pt is a 79 y/o male, hx Schizophrenia, dementia, in current psych tx at [facility] Dr. Dupree, on depakote , trazodone, bib ems for agitation at skilled nursing, was threatening to hurt staff with bat. Pt poor historian, talking very loudly at baseline, yelling. Pt denies being agitated at the nursing home, denies threatening staff. Pt denies depression, anxiety, fili, psychosis. pt reports fair sleep, appetite. pt repeatedly stated he just wants to go home. Pt states he usually goes to the [facility] for dialysis treatment, upset he was brought to [facility]. collateral obtained from supervisor, Yovana [phone], states pt has been noncompliant with meds for long time, not attending dialysis treatmetns. They are concenred pt has become more irritable, threatenign staff at times, sometimes hitting staff. Pt has not reported SI, no psychosis, fili.  SAD, schizophrenia  pt poor historian unable to verify history (19 Dec 2023 20:56)      Patient seen and evaluated at bedside. No acute events overnight except as noted.    Interval HPI: overnight events noted     PAST MEDICAL & SURGICAL HISTORY:  ESRD on dialysis      Schizophrenia          REVIEW OF SYSTEMS:  as per hpi       VITALS:   Vital Signs Last 24 Hrs  T(C): 36.4 (21 Dec 2023 23:50), Max: 36.4 (21 Dec 2023 23:50)  T(F): 97.6 (21 Dec 2023 23:50), Max: 97.6 (21 Dec 2023 23:50)  HR: 78 (21 Dec 2023 23:50) (69 - 78)  BP: 158/74 (21 Dec 2023 23:50) (139/65 - 158/74)  BP(mean): --  RR: 18 (21 Dec 2023 23:50) (18 - 18)  SpO2: 100% (21 Dec 2023 23:50) (100% - 100%)    Parameters below as of 21 Dec 2023 23:50  Patient On (Oxygen Delivery Method): room air        PHYSICAL EXAM:  GENERAL: NAD, well-developed,    HEAD:  Atraumatic, Normocephalic  EYES: EOMI, PERRLA, conjunctiva and sclera clear  NECK: Supple, No JVD  CHEST/LUNG: Clear to auscultation bilaterally; No wheeze  HEART: S1, S2; No murmurs, rubs, or gallops  ABDOMEN: Soft, Nontender, Nondistended; Bowel sounds present  EXTREMITIES:  2+ Peripheral Pulses, No clubbing, cyanosis, or edema  PSYCH: Normal affect  NEUROLOGY: AAOX2-3; non-focal  SKIN: No rashes or lesions    Consultant(s) Notes Reviewed:  [x ] YES  [ ] NO  Care Discussed with Consultants/Other Providers [ x] YES  [ ] NO    MEDS:   MEDICATIONS  (STANDING):  chlorhexidine 2% Cloths 1 Application(s) Topical daily  clopidogrel Tablet 75 milliGRAM(s) Oral daily  dextrose 5%. 1000 milliLiter(s) (50 mL/Hr) IV Continuous <Continuous>  dextrose 5%. 1000 milliLiter(s) (100 mL/Hr) IV Continuous <Continuous>  dextrose 50% Injectable 25 Gram(s) IV Push once  dextrose 50% Injectable 25 Gram(s) IV Push once  dextrose 50% Injectable 12.5 Gram(s) IV Push once  divalproex  milliGRAM(s) Oral two times a day  finasteride 5 milliGRAM(s) Oral daily  glucagon  Injectable 1 milliGRAM(s) IntraMuscular once  hydrALAZINE 25 milliGRAM(s) Oral every 8 hours  insulin glargine Injectable (LANTUS) 8 Unit(s) SubCutaneous at bedtime  insulin lispro (ADMELOG) corrective regimen sliding scale   SubCutaneous three times a day before meals  isosorbide   mononitrate ER Tablet (IMDUR) 30 milliGRAM(s) Oral daily  metoprolol tartrate 100 milliGRAM(s) Oral daily  tamsulosin 0.4 milliGRAM(s) Oral at bedtime  traZODone 100 milliGRAM(s) Oral at bedtime    MEDICATIONS  (PRN):  dextrose Oral Gel 15 Gram(s) Oral once PRN Blood Glucose LESS THAN 70 milliGRAM(s)/deciliter        ALLERGIES:  Allergy Status Unknown      LABS:       TSH: Thyroid Stimulating Hormone, Serum: 2.59 uIU/mL (12-19 @ 15:21)     < from: US Kidney and Bladder (12.19.23 @ 23:39) >  IMPRESSION:  1.  No hydronephrosis.  2.  Increased echogenicity compatible with medical renal disease.  3.  Layering debris within the urinary bladder. Correlate with urinalysis.    --- End of Report ---        < end of copied text >

## 2023-12-22 NOTE — CHART NOTE - NSCHARTNOTEFT_GEN_A_CORE
Medicine PA Agitation Note    Code Grey called by RN for agitation. Patient seen and assessed, appears to be acutely agitated, combative, and throwing objects. Patient is A&Ox1  and is unable to be re-oriented. Patient poses a risk to both themselves and staff.     Plan  #Acute Agitation   -Pt on Depakote 500 mg, dose administered. Psych following.   -Haldol 5 mg IM administered STAT.   -C/w Depakote 500 mg BID and Trazadone 100 mg at bedtime.   - Enhanced supervision  -Will endorse to AM team     Karen Walter PA-C  Department of Medicine

## 2023-12-22 NOTE — BH CONSULTATION LIAISON PROGRESS NOTE - NSBHASSESSMENTFT_PSY_ALL_CORE
Pt is a 79 y/o male, hx Schizophrenia, dementia, in current psych tx at nursing home [facility] Dr. Dupree, on depakote , trazodone, bib EMS for agitation at residential, was threatening to hurt staff with bat. Pt poor historian, talking very loudly at baseline, yelling. Pt denies being agitated at the nursing home, denies threatening staff. Pt denies depression, anxiety, fili, psychosis. pt reports fair sleep, appetite. pt repeatedly stated he just wants to go home. Pt states he usually goes to the [facility] for dialysis treatment, upset he was brought to [facility].    Patient continues to be intermittently agitated overnight. Consider initiating Haldol 2mg p.o. at bedtime if QTc <500ms. Continue valproate, obtain levels. Pt is a 79 y/o male, hx Schizophrenia, dementia, in current psych tx at nursing home [facility] Dr. Dupree, on depakote , trazodone, bib EMS for agitation at [facility], was threatening to hurt staff with bat. Pt poor historian, talking very loudly at baseline, yelling. Pt denies being agitated at the nursing home, denies threatening staff. Pt denies depression, anxiety, fili, psychosis. pt reports fair sleep, appetite. pt repeatedly stated he just wants to go home. Pt states he usually goes to the [facility] for dialysis treatment, upset he was brought to [facility].    Patient continues to be intermittently agitated overnight. Consider initiating Haldol 2mg p.o. at bedtime if QTc <500ms. Continue valproate, obtain levels.

## 2023-12-23 LAB
GLUCOSE BLDC GLUCOMTR-MCNC: 185 MG/DL — HIGH (ref 70–99)
GLUCOSE BLDC GLUCOMTR-MCNC: 185 MG/DL — HIGH (ref 70–99)
GLUCOSE BLDC GLUCOMTR-MCNC: 204 MG/DL — HIGH (ref 70–99)
GLUCOSE BLDC GLUCOMTR-MCNC: 204 MG/DL — HIGH (ref 70–99)
GLUCOSE BLDC GLUCOMTR-MCNC: 245 MG/DL — HIGH (ref 70–99)
GLUCOSE BLDC GLUCOMTR-MCNC: 245 MG/DL — HIGH (ref 70–99)
GLUCOSE BLDC GLUCOMTR-MCNC: 75 MG/DL — SIGNIFICANT CHANGE UP (ref 70–99)
GLUCOSE BLDC GLUCOMTR-MCNC: 75 MG/DL — SIGNIFICANT CHANGE UP (ref 70–99)
GLUCOSE BLDC GLUCOMTR-MCNC: >600 MG/DL — CRITICAL HIGH (ref 70–99)
GLUCOSE BLDC GLUCOMTR-MCNC: >600 MG/DL — CRITICAL HIGH (ref 70–99)

## 2023-12-23 RX ADMIN — Medication 100 MILLIGRAM(S): at 06:19

## 2023-12-23 RX ADMIN — Medication 2: at 17:52

## 2023-12-23 RX ADMIN — ISOSORBIDE MONONITRATE 30 MILLIGRAM(S): 60 TABLET, EXTENDED RELEASE ORAL at 11:49

## 2023-12-23 RX ADMIN — DIVALPROEX SODIUM 500 MILLIGRAM(S): 500 TABLET, DELAYED RELEASE ORAL at 17:47

## 2023-12-23 RX ADMIN — Medication 25 MILLIGRAM(S): at 22:20

## 2023-12-23 RX ADMIN — CHLORHEXIDINE GLUCONATE 1 APPLICATION(S): 213 SOLUTION TOPICAL at 11:50

## 2023-12-23 RX ADMIN — Medication 25 MILLIGRAM(S): at 14:03

## 2023-12-23 RX ADMIN — INSULIN GLARGINE 8 UNIT(S): 100 INJECTION, SOLUTION SUBCUTANEOUS at 22:18

## 2023-12-23 RX ADMIN — Medication 100 MILLIGRAM(S): at 22:19

## 2023-12-23 RX ADMIN — Medication 2: at 12:27

## 2023-12-23 RX ADMIN — TAMSULOSIN HYDROCHLORIDE 0.4 MILLIGRAM(S): 0.4 CAPSULE ORAL at 22:20

## 2023-12-23 RX ADMIN — CLOPIDOGREL BISULFATE 75 MILLIGRAM(S): 75 TABLET, FILM COATED ORAL at 11:49

## 2023-12-23 RX ADMIN — Medication 25 MILLIGRAM(S): at 06:19

## 2023-12-23 RX ADMIN — HALOPERIDOL DECANOATE 2 MILLIGRAM(S): 100 INJECTION INTRAMUSCULAR at 22:20

## 2023-12-23 RX ADMIN — FINASTERIDE 5 MILLIGRAM(S): 5 TABLET, FILM COATED ORAL at 11:49

## 2023-12-23 RX ADMIN — DIVALPROEX SODIUM 500 MILLIGRAM(S): 500 TABLET, DELAYED RELEASE ORAL at 06:19

## 2023-12-23 NOTE — PROGRESS NOTE ADULT - SUBJECTIVE AND OBJECTIVE BOX
MILTON ARIAS  78y Male  MRN:[id number]    Patient is a 78y old  Male who presents with a chief complaint of   HPI:  Pt is a 79 y/o male, hx Schizophrenia, dementia, in current psych tx at [facility] Dr. Dupree, on depakote , trazodone, bib ems for agitation at long term, was threatening to hurt staff with bat. Pt poor historian, talking very loudly at baseline, yelling. Pt denies being agitated at the nursing home, denies threatening staff. Pt denies depression, anxiety, fili, psychosis. pt reports fair sleep, appetite. pt repeatedly stated he just wants to go home. Pt states he usually goes to the [facility] for dialysis treatment, upset he was brought to [facility]. collateral obtained from supervisor, Yovana [phone], states pt has been noncompliant with meds for long time, not attending dialysis treatmetns. They are concenred pt has become more irritable, threatenign staff at times, sometimes hitting staff. Pt has not reported SI, no psychosis, fili.  SAD, schizophrenia  pt poor historian unable to verify history (19 Dec 2023 20:56)      Patient seen and evaluated at bedside. No acute events overnight except as noted.    Interval HPI: overnight events noted     PAST MEDICAL & SURGICAL HISTORY:  ESRD on dialysis      Schizophrenia          REVIEW OF SYSTEMS:  as per hpi       VITALS:   Vital Signs Last 24 Hrs  T(C): 36.3 (23 Dec 2023 04:43), Max: 36.3 (23 Dec 2023 04:43)  T(F): 97.4 (23 Dec 2023 04:43), Max: 97.4 (23 Dec 2023 04:43)  HR: 84 (23 Dec 2023 04:43) (84 - 84)  BP: 155/62 (23 Dec 2023 04:43) (155/62 - 155/62)  BP(mean): --  RR: 18 (23 Dec 2023 04:43) (18 - 18)  SpO2: 98% (23 Dec 2023 04:43) (98% - 98%)    Parameters below as of 23 Dec 2023 04:43  Patient On (Oxygen Delivery Method): room air          PHYSICAL EXAM:  GENERAL: NAD, well-developed,    HEAD:  Atraumatic, Normocephalic  EYES: EOMI, PERRLA, conjunctiva and sclera clear  NECK: Supple, No JVD  CHEST/LUNG: Clear to auscultation bilaterally; No wheeze  HEART: S1, S2; No murmurs, rubs, or gallops  ABDOMEN: Soft, Nontender, Nondistended; Bowel sounds present  EXTREMITIES:  2+ Peripheral Pulses, No clubbing, cyanosis, or edema  PSYCH: Normal affect  NEUROLOGY: AAOX2-3; non-focal  SKIN: No rashes or lesions    Consultant(s) Notes Reviewed:  [x ] YES  [ ] NO  Care Discussed with Consultants/Other Providers [ x] YES  [ ] NO    MEDS:   MEDICATIONS  (STANDING):  chlorhexidine 2% Cloths 1 Application(s) Topical daily  clopidogrel Tablet 75 milliGRAM(s) Oral daily  dextrose 5%. 1000 milliLiter(s) (100 mL/Hr) IV Continuous <Continuous>  dextrose 5%. 1000 milliLiter(s) (50 mL/Hr) IV Continuous <Continuous>  dextrose 50% Injectable 25 Gram(s) IV Push once  dextrose 50% Injectable 12.5 Gram(s) IV Push once  dextrose 50% Injectable 25 Gram(s) IV Push once  divalproex  milliGRAM(s) Oral two times a day  finasteride 5 milliGRAM(s) Oral daily  glucagon  Injectable 1 milliGRAM(s) IntraMuscular once  haloperidol     Tablet 2 milliGRAM(s) Oral at bedtime  hydrALAZINE 25 milliGRAM(s) Oral every 8 hours  insulin glargine Injectable (LANTUS) 8 Unit(s) SubCutaneous at bedtime  insulin lispro (ADMELOG) corrective regimen sliding scale   SubCutaneous three times a day before meals  isosorbide   mononitrate ER Tablet (IMDUR) 30 milliGRAM(s) Oral daily  metoprolol tartrate 100 milliGRAM(s) Oral daily  tamsulosin 0.4 milliGRAM(s) Oral at bedtime  traZODone 100 milliGRAM(s) Oral at bedtime    MEDICATIONS  (PRN):  dextrose Oral Gel 15 Gram(s) Oral once PRN Blood Glucose LESS THAN 70 milliGRAM(s)/deciliter        ALLERGIES:  Allergy Status Unknown      LABS:       TSH: Thyroid Stimulating Hormone, Serum: 2.59 uIU/mL (12-19 @ 15:21)     < from: US Kidney and Bladder (12.19.23 @ 23:39) >  IMPRESSION:  1.  No hydronephrosis.  2.  Increased echogenicity compatible with medical renal disease.  3.  Layering debris within the urinary bladder. Correlate with urinalysis.    --- End of Report ---        < end of copied text >   MILTON ARIAS  78y Male  MRN:[id number]    Patient is a 78y old  Male who presents with a chief complaint of   HPI:  Pt is a 79 y/o male, hx Schizophrenia, dementia, in current psych tx at [facility] Dr. Dupree, on depakote , trazodone, bib ems for agitation at [facility], was threatening to hurt staff with bat. Pt poor historian, talking very loudly at baseline, yelling. Pt denies being agitated at the nursing home, denies threatening staff. Pt denies depression, anxiety, fili, psychosis. pt reports fair sleep, appetite. pt repeatedly stated he just wants to go home. Pt states he usually goes to the [facility] for dialysis treatment, upset he was brought to [facility]. collateral obtained from supervisor, Yovana [phone], states pt has been noncompliant with meds for long time, not attending dialysis treatmetns. They are concenred pt has become more irritable, threatenign staff at times, sometimes hitting staff. Pt has not reported SI, no psychosis, fili.  SAD, schizophrenia  pt poor historian unable to verify history (19 Dec 2023 20:56)      Patient seen and evaluated at bedside. No acute events overnight except as noted.    Interval HPI: overnight events noted     PAST MEDICAL & SURGICAL HISTORY:  ESRD on dialysis      Schizophrenia          REVIEW OF SYSTEMS:  as per hpi       VITALS:   Vital Signs Last 24 Hrs  T(C): 36.3 (23 Dec 2023 04:43), Max: 36.3 (23 Dec 2023 04:43)  T(F): 97.4 (23 Dec 2023 04:43), Max: 97.4 (23 Dec 2023 04:43)  HR: 84 (23 Dec 2023 04:43) (84 - 84)  BP: 155/62 (23 Dec 2023 04:43) (155/62 - 155/62)  BP(mean): --  RR: 18 (23 Dec 2023 04:43) (18 - 18)  SpO2: 98% (23 Dec 2023 04:43) (98% - 98%)    Parameters below as of 23 Dec 2023 04:43  Patient On (Oxygen Delivery Method): room air          PHYSICAL EXAM:  GENERAL: NAD, well-developed,    HEAD:  Atraumatic, Normocephalic  EYES: EOMI, PERRLA, conjunctiva and sclera clear  NECK: Supple, No JVD  CHEST/LUNG: Clear to auscultation bilaterally; No wheeze  HEART: S1, S2; No murmurs, rubs, or gallops  ABDOMEN: Soft, Nontender, Nondistended; Bowel sounds present  EXTREMITIES:  2+ Peripheral Pulses, No clubbing, cyanosis, or edema  PSYCH: Normal affect  NEUROLOGY: AAOX2-3; non-focal  SKIN: No rashes or lesions    Consultant(s) Notes Reviewed:  [x ] YES  [ ] NO  Care Discussed with Consultants/Other Providers [ x] YES  [ ] NO    MEDS:   MEDICATIONS  (STANDING):  chlorhexidine 2% Cloths 1 Application(s) Topical daily  clopidogrel Tablet 75 milliGRAM(s) Oral daily  dextrose 5%. 1000 milliLiter(s) (100 mL/Hr) IV Continuous <Continuous>  dextrose 5%. 1000 milliLiter(s) (50 mL/Hr) IV Continuous <Continuous>  dextrose 50% Injectable 25 Gram(s) IV Push once  dextrose 50% Injectable 12.5 Gram(s) IV Push once  dextrose 50% Injectable 25 Gram(s) IV Push once  divalproex  milliGRAM(s) Oral two times a day  finasteride 5 milliGRAM(s) Oral daily  glucagon  Injectable 1 milliGRAM(s) IntraMuscular once  haloperidol     Tablet 2 milliGRAM(s) Oral at bedtime  hydrALAZINE 25 milliGRAM(s) Oral every 8 hours  insulin glargine Injectable (LANTUS) 8 Unit(s) SubCutaneous at bedtime  insulin lispro (ADMELOG) corrective regimen sliding scale   SubCutaneous three times a day before meals  isosorbide   mononitrate ER Tablet (IMDUR) 30 milliGRAM(s) Oral daily  metoprolol tartrate 100 milliGRAM(s) Oral daily  tamsulosin 0.4 milliGRAM(s) Oral at bedtime  traZODone 100 milliGRAM(s) Oral at bedtime    MEDICATIONS  (PRN):  dextrose Oral Gel 15 Gram(s) Oral once PRN Blood Glucose LESS THAN 70 milliGRAM(s)/deciliter        ALLERGIES:  Allergy Status Unknown      LABS:       TSH: Thyroid Stimulating Hormone, Serum: 2.59 uIU/mL (12-19 @ 15:21)     < from: US Kidney and Bladder (12.19.23 @ 23:39) >  IMPRESSION:  1.  No hydronephrosis.  2.  Increased echogenicity compatible with medical renal disease.  3.  Layering debris within the urinary bladder. Correlate with urinalysis.    --- End of Report ---        < end of copied text >

## 2023-12-24 LAB
GLUCOSE BLDC GLUCOMTR-MCNC: 105 MG/DL — HIGH (ref 70–99)
GLUCOSE BLDC GLUCOMTR-MCNC: 105 MG/DL — HIGH (ref 70–99)
GLUCOSE BLDC GLUCOMTR-MCNC: 123 MG/DL — HIGH (ref 70–99)
GLUCOSE BLDC GLUCOMTR-MCNC: 123 MG/DL — HIGH (ref 70–99)
GLUCOSE BLDC GLUCOMTR-MCNC: 144 MG/DL — HIGH (ref 70–99)
GLUCOSE BLDC GLUCOMTR-MCNC: 144 MG/DL — HIGH (ref 70–99)
GLUCOSE BLDC GLUCOMTR-MCNC: 153 MG/DL — HIGH (ref 70–99)
GLUCOSE BLDC GLUCOMTR-MCNC: 153 MG/DL — HIGH (ref 70–99)

## 2023-12-24 RX ADMIN — Medication 100 MILLIGRAM(S): at 22:43

## 2023-12-24 RX ADMIN — Medication 1: at 12:26

## 2023-12-24 RX ADMIN — CHLORHEXIDINE GLUCONATE 1 APPLICATION(S): 213 SOLUTION TOPICAL at 12:25

## 2023-12-24 RX ADMIN — Medication 100 MILLIGRAM(S): at 06:14

## 2023-12-24 RX ADMIN — FINASTERIDE 5 MILLIGRAM(S): 5 TABLET, FILM COATED ORAL at 12:26

## 2023-12-24 RX ADMIN — Medication 25 MILLIGRAM(S): at 13:17

## 2023-12-24 RX ADMIN — CLOPIDOGREL BISULFATE 75 MILLIGRAM(S): 75 TABLET, FILM COATED ORAL at 12:25

## 2023-12-24 RX ADMIN — Medication 25 MILLIGRAM(S): at 22:43

## 2023-12-24 RX ADMIN — INSULIN GLARGINE 8 UNIT(S): 100 INJECTION, SOLUTION SUBCUTANEOUS at 22:43

## 2023-12-24 RX ADMIN — DIVALPROEX SODIUM 500 MILLIGRAM(S): 500 TABLET, DELAYED RELEASE ORAL at 06:14

## 2023-12-24 RX ADMIN — ISOSORBIDE MONONITRATE 30 MILLIGRAM(S): 60 TABLET, EXTENDED RELEASE ORAL at 12:25

## 2023-12-24 RX ADMIN — HALOPERIDOL DECANOATE 2 MILLIGRAM(S): 100 INJECTION INTRAMUSCULAR at 22:43

## 2023-12-24 RX ADMIN — DIVALPROEX SODIUM 500 MILLIGRAM(S): 500 TABLET, DELAYED RELEASE ORAL at 17:20

## 2023-12-24 RX ADMIN — TAMSULOSIN HYDROCHLORIDE 0.4 MILLIGRAM(S): 0.4 CAPSULE ORAL at 22:43

## 2023-12-24 RX ADMIN — Medication 25 MILLIGRAM(S): at 06:14

## 2023-12-24 NOTE — PROGRESS NOTE ADULT - SUBJECTIVE AND OBJECTIVE BOX
MILTON ARIAS  78y Male  MRN:[id number]    Patient is a 78y old  Male who presents with a chief complaint of   HPI:  Pt is a 79 y/o male, hx Schizophrenia, dementia, in current psych tx at [facility] Dr. Dupree, on depakote , trazodone, bib ems for [facility] jail, was threatening to hurt staff with bat. Pt poor historian, talking very loudly at baseline, yelling. Pt denies being agitated at the nursing home, denies threatening staff. Pt denies depression, anxiety, fili, psychosis. pt reports fair sleep, appetite. pt repeatedly stated he just wants to go home. Pt states he usually goes to the [facility] for dialysis treatment, upset he was brought to [facility]. collateral obtained from supervisor, Yovana [phone], states pt has been noncompliant with meds for long time, not attending dialysis treatmetns. They are concenred pt has become more irritable, threatenign staff at times, sometimes hitting staff. Pt has not reported SI, no psychosis, fili.  SAD, schizophrenia  pt poor historian unable to verify history (19 Dec 2023 20:56)      Patient seen and evaluated at bedside. No acute events overnight except as noted.    Interval HPI: overnight events noted     PAST MEDICAL & SURGICAL HISTORY:  ESRD on dialysis      Schizophrenia          REVIEW OF SYSTEMS:  as per hpi       VITALS:   Vital Signs Last 24 Hrs  T(C): --  T(F): --  HR: --  BP: --  BP(mean): --  RR: --  SpO2: --            PHYSICAL EXAM:  GENERAL: NAD, well-developed,    HEAD:  Atraumatic, Normocephalic  EYES: EOMI, PERRLA, conjunctiva and sclera clear  NECK: Supple, No JVD  CHEST/LUNG: Clear to auscultation bilaterally; No wheeze  HEART: S1, S2; No murmurs, rubs, or gallops  ABDOMEN: Soft, Nontender, Nondistended; Bowel sounds present  EXTREMITIES:  2+ Peripheral Pulses, No clubbing, cyanosis, or edema  PSYCH: Normal affect  NEUROLOGY: AAOX2-3; non-focal  SKIN: No rashes or lesions    Consultant(s) Notes Reviewed:  [x ] YES  [ ] NO  Care Discussed with Consultants/Other Providers [ x] YES  [ ] NO    MEDS:   MEDICATIONS  (STANDING):  chlorhexidine 2% Cloths 1 Application(s) Topical daily  clopidogrel Tablet 75 milliGRAM(s) Oral daily  dextrose 5%. 1000 milliLiter(s) (100 mL/Hr) IV Continuous <Continuous>  dextrose 5%. 1000 milliLiter(s) (50 mL/Hr) IV Continuous <Continuous>  dextrose 50% Injectable 25 Gram(s) IV Push once  dextrose 50% Injectable 12.5 Gram(s) IV Push once  dextrose 50% Injectable 25 Gram(s) IV Push once  divalproex  milliGRAM(s) Oral two times a day  finasteride 5 milliGRAM(s) Oral daily  glucagon  Injectable 1 milliGRAM(s) IntraMuscular once  haloperidol     Tablet 2 milliGRAM(s) Oral at bedtime  hydrALAZINE 25 milliGRAM(s) Oral every 8 hours  insulin glargine Injectable (LANTUS) 8 Unit(s) SubCutaneous at bedtime  insulin lispro (ADMELOG) corrective regimen sliding scale   SubCutaneous three times a day before meals  isosorbide   mononitrate ER Tablet (IMDUR) 30 milliGRAM(s) Oral daily  metoprolol tartrate 100 milliGRAM(s) Oral daily  tamsulosin 0.4 milliGRAM(s) Oral at bedtime  traZODone 100 milliGRAM(s) Oral at bedtime    MEDICATIONS  (PRN):  dextrose Oral Gel 15 Gram(s) Oral once PRN Blood Glucose LESS THAN 70 milliGRAM(s)/deciliter        ALLERGIES:  Allergy Status Unknown      LABS:       TSH: Thyroid Stimulating Hormone, Serum: 2.59 uIU/mL (12-19 @ 15:21)     < from: US Kidney and Bladder (12.19.23 @ 23:39) >  IMPRESSION:  1.  No hydronephrosis.  2.  Increased echogenicity compatible with medical renal disease.  3.  Layering debris within the urinary bladder. Correlate with urinalysis.    --- End of Report ---        < end of copied text >   MILTON ARIAS  78y Male  MRN:[id number]    Patient is a 78y old  Male who presents with a chief complaint of   HPI:  Pt is a 77 y/o male, hx Schizophrenia, dementia, in current psych tx at [facility] Dr. Dupree, on depakote , trazodone, bib ems for agitation at [facility], was threatening to hurt staff with bat. Pt poor historian, talking very loudly at baseline, yelling. Pt denies being agitated at the nursing home, denies threatening staff. Pt denies depression, anxiety, fili, psychosis. pt reports fair sleep, appetite. pt repeatedly stated he just wants to go home. Pt states he usually goes to the [facility] for dialysis treatment, upset he was brought to [facility]. collateral obtained from supervisor, Yovana [phone], states pt has been noncompliant with meds for long time, not attending dialysis treatmetns. They are concenred pt has become more irritable, threatenign staff at times, sometimes hitting staff. Pt has not reported SI, no psychosis, fili.  SAD, schizophrenia  pt poor historian unable to verify history (19 Dec 2023 20:56)      Patient seen and evaluated at bedside. No acute events overnight except as noted.    Interval HPI: overnight events noted     PAST MEDICAL & SURGICAL HISTORY:  ESRD on dialysis      Schizophrenia          REVIEW OF SYSTEMS:  as per hpi       VITALS:   Vital Signs Last 24 Hrs  T(C): --  T(F): --  HR: --  BP: --  BP(mean): --  RR: --  SpO2: --            PHYSICAL EXAM:  GENERAL: NAD, well-developed,    HEAD:  Atraumatic, Normocephalic  EYES: EOMI, PERRLA, conjunctiva and sclera clear  NECK: Supple, No JVD  CHEST/LUNG: Clear to auscultation bilaterally; No wheeze  HEART: S1, S2; No murmurs, rubs, or gallops  ABDOMEN: Soft, Nontender, Nondistended; Bowel sounds present  EXTREMITIES:  2+ Peripheral Pulses, No clubbing, cyanosis, or edema  PSYCH: Normal affect  NEUROLOGY: AAOX2-3; non-focal  SKIN: No rashes or lesions    Consultant(s) Notes Reviewed:  [x ] YES  [ ] NO  Care Discussed with Consultants/Other Providers [ x] YES  [ ] NO    MEDS:   MEDICATIONS  (STANDING):  chlorhexidine 2% Cloths 1 Application(s) Topical daily  clopidogrel Tablet 75 milliGRAM(s) Oral daily  dextrose 5%. 1000 milliLiter(s) (100 mL/Hr) IV Continuous <Continuous>  dextrose 5%. 1000 milliLiter(s) (50 mL/Hr) IV Continuous <Continuous>  dextrose 50% Injectable 25 Gram(s) IV Push once  dextrose 50% Injectable 12.5 Gram(s) IV Push once  dextrose 50% Injectable 25 Gram(s) IV Push once  divalproex  milliGRAM(s) Oral two times a day  finasteride 5 milliGRAM(s) Oral daily  glucagon  Injectable 1 milliGRAM(s) IntraMuscular once  haloperidol     Tablet 2 milliGRAM(s) Oral at bedtime  hydrALAZINE 25 milliGRAM(s) Oral every 8 hours  insulin glargine Injectable (LANTUS) 8 Unit(s) SubCutaneous at bedtime  insulin lispro (ADMELOG) corrective regimen sliding scale   SubCutaneous three times a day before meals  isosorbide   mononitrate ER Tablet (IMDUR) 30 milliGRAM(s) Oral daily  metoprolol tartrate 100 milliGRAM(s) Oral daily  tamsulosin 0.4 milliGRAM(s) Oral at bedtime  traZODone 100 milliGRAM(s) Oral at bedtime    MEDICATIONS  (PRN):  dextrose Oral Gel 15 Gram(s) Oral once PRN Blood Glucose LESS THAN 70 milliGRAM(s)/deciliter        ALLERGIES:  Allergy Status Unknown      LABS:       TSH: Thyroid Stimulating Hormone, Serum: 2.59 uIU/mL (12-19 @ 15:21)     < from: US Kidney and Bladder (12.19.23 @ 23:39) >  IMPRESSION:  1.  No hydronephrosis.  2.  Increased echogenicity compatible with medical renal disease.  3.  Layering debris within the urinary bladder. Correlate with urinalysis.    --- End of Report ---        < end of copied text >

## 2023-12-25 LAB
GLUCOSE BLDC GLUCOMTR-MCNC: 104 MG/DL — HIGH (ref 70–99)
GLUCOSE BLDC GLUCOMTR-MCNC: 104 MG/DL — HIGH (ref 70–99)
GLUCOSE BLDC GLUCOMTR-MCNC: 129 MG/DL — HIGH (ref 70–99)
GLUCOSE BLDC GLUCOMTR-MCNC: 129 MG/DL — HIGH (ref 70–99)
GLUCOSE BLDC GLUCOMTR-MCNC: 157 MG/DL — HIGH (ref 70–99)
GLUCOSE BLDC GLUCOMTR-MCNC: 157 MG/DL — HIGH (ref 70–99)
GLUCOSE BLDC GLUCOMTR-MCNC: 185 MG/DL — HIGH (ref 70–99)
GLUCOSE BLDC GLUCOMTR-MCNC: 185 MG/DL — HIGH (ref 70–99)

## 2023-12-25 RX ADMIN — DIVALPROEX SODIUM 500 MILLIGRAM(S): 500 TABLET, DELAYED RELEASE ORAL at 21:14

## 2023-12-25 RX ADMIN — Medication 25 MILLIGRAM(S): at 21:15

## 2023-12-25 RX ADMIN — ISOSORBIDE MONONITRATE 30 MILLIGRAM(S): 60 TABLET, EXTENDED RELEASE ORAL at 21:15

## 2023-12-25 RX ADMIN — DIVALPROEX SODIUM 500 MILLIGRAM(S): 500 TABLET, DELAYED RELEASE ORAL at 06:21

## 2023-12-25 RX ADMIN — Medication 100 MILLIGRAM(S): at 06:21

## 2023-12-25 RX ADMIN — HALOPERIDOL DECANOATE 2 MILLIGRAM(S): 100 INJECTION INTRAMUSCULAR at 21:15

## 2023-12-25 RX ADMIN — Medication 100 MILLIGRAM(S): at 21:15

## 2023-12-25 RX ADMIN — Medication 25 MILLIGRAM(S): at 06:21

## 2023-12-25 RX ADMIN — TAMSULOSIN HYDROCHLORIDE 0.4 MILLIGRAM(S): 0.4 CAPSULE ORAL at 21:15

## 2023-12-25 RX ADMIN — INSULIN GLARGINE 8 UNIT(S): 100 INJECTION, SOLUTION SUBCUTANEOUS at 21:15

## 2023-12-25 RX ADMIN — Medication 1 DROP(S): at 06:21

## 2023-12-25 RX ADMIN — CHLORHEXIDINE GLUCONATE 1 APPLICATION(S): 213 SOLUTION TOPICAL at 11:09

## 2023-12-25 NOTE — PROGRESS NOTE ADULT - SUBJECTIVE AND OBJECTIVE BOX
MILTON ARIAS  78y Male  MRN:[id number]    Patient is a 78y old  Male who presents with a chief complaint of   HPI:  Pt is a 79 y/o male, hx Schizophrenia, dementia, in current psych tx at [facility] Dr. Dupree, on depakote , trazodone, bib ems for agitation at [employer], was threatening to hurt staff with bat. Pt poor historian, talking very loudly at baseline, yelling. Pt denies being agitated at the nursing home, denies threatening staff. Pt denies depression, anxiety, fili, psychosis. pt reports fair sleep, appetite. pt repeatedly stated he just wants to go home. Pt states he usually goes to the [facility] for dialysis treatment, upset he was brought to [facility]. collateral obtained from supervisor, Yovana [phone], states pt has been noncompliant with meds for long time, not attending dialysis treatmetns. They are concenred pt has become more irritable, threatenign staff at times, sometimes hitting staff. Pt has not reported SI, no psychosis, fili.  SAD, schizophrenia  pt poor historian unable to verify history (19 Dec 2023 20:56)      Patient seen and evaluated at bedside. No acute events overnight except as noted.    Interval HPI: overnight events noted     PAST MEDICAL & SURGICAL HISTORY:  ESRD on dialysis      Schizophrenia          REVIEW OF SYSTEMS:  as per hpi       VITALS:   Vital Signs Last 24 Hrs  T(C): 36.4 (25 Dec 2023 12:45), Max: 36.4 (25 Dec 2023 00:17)  T(F): 97.5 (25 Dec 2023 12:45), Max: 97.5 (25 Dec 2023 00:17)  HR: 89 (25 Dec 2023 12:45) (81 - 89)  BP: 130/69 (25 Dec 2023 12:45) (130/69 - 150/67)  BP(mean): --  RR: 18 (25 Dec 2023 12:45) (18 - 18)  SpO2: 100% (25 Dec 2023 12:45) (100% - 100%)    Parameters below as of 25 Dec 2023 12:45  Patient On (Oxygen Delivery Method): room air          PHYSICAL EXAM:  GENERAL: NAD, well-developed,    HEAD:  Atraumatic, Normocephalic  EYES: EOMI, PERRLA, conjunctiva and sclera clear  NECK: Supple, No JVD  CHEST/LUNG: Clear to auscultation bilaterally; No wheeze  HEART: S1, S2; No murmurs, rubs, or gallops  ABDOMEN: Soft, Nontender, Nondistended; Bowel sounds present  EXTREMITIES:  2+ Peripheral Pulses, No clubbing, cyanosis, or edema  PSYCH: Normal affect  NEUROLOGY: AAOX2-3; non-focal  SKIN: No rashes or lesions    Consultant(s) Notes Reviewed:  [x ] YES  [ ] NO  Care Discussed with Consultants/Other Providers [ x] YES  [ ] NO    MEDS:   MEDICATIONS  (STANDING):  chlorhexidine 2% Cloths 1 Application(s) Topical daily  clopidogrel Tablet 75 milliGRAM(s) Oral daily  dextrose 5%. 1000 milliLiter(s) (100 mL/Hr) IV Continuous <Continuous>  dextrose 5%. 1000 milliLiter(s) (50 mL/Hr) IV Continuous <Continuous>  dextrose 50% Injectable 25 Gram(s) IV Push once  dextrose 50% Injectable 12.5 Gram(s) IV Push once  dextrose 50% Injectable 25 Gram(s) IV Push once  divalproex  milliGRAM(s) Oral two times a day  finasteride 5 milliGRAM(s) Oral daily  glucagon  Injectable 1 milliGRAM(s) IntraMuscular once  haloperidol     Tablet 2 milliGRAM(s) Oral at bedtime  hydrALAZINE 25 milliGRAM(s) Oral every 8 hours  insulin glargine Injectable (LANTUS) 8 Unit(s) SubCutaneous at bedtime  insulin lispro (ADMELOG) corrective regimen sliding scale   SubCutaneous three times a day before meals  isosorbide   mononitrate ER Tablet (IMDUR) 30 milliGRAM(s) Oral daily  metoprolol tartrate 100 milliGRAM(s) Oral daily  tamsulosin 0.4 milliGRAM(s) Oral at bedtime  traZODone 100 milliGRAM(s) Oral at bedtime    MEDICATIONS  (PRN):  dextrose Oral Gel 15 Gram(s) Oral once PRN Blood Glucose LESS THAN 70 milliGRAM(s)/deciliter        ALLERGIES:  Allergy Status Unknown      LABS:       TSH: Thyroid Stimulating Hormone, Serum: 2.59 uIU/mL (12-19 @ 15:21)     < from: US Kidney and Bladder (12.19.23 @ 23:39) >  IMPRESSION:  1.  No hydronephrosis.  2.  Increased echogenicity compatible with medical renal disease.  3.  Layering debris within the urinary bladder. Correlate with urinalysis.    --- End of Report ---        < end of copied text >   MILTON ARIAS  78y Male  MRN:[id number]    Patient is a 78y old  Male who presents with a chief complaint of   HPI:  Pt is a 79 y/o male, hx Schizophrenia, dementia, in current psych tx at [facility] Dr. Dupree, on depakote , trazodone, bib ems for agitation at long term, was threatening to hurt staff with bat. Pt poor historian, talking very loudly at baseline, yelling. Pt denies being agitated at the nursing home, denies threatening staff. Pt denies depression, anxiety, fili, psychosis. pt reports fair sleep, appetite. pt repeatedly stated he just wants to go home. Pt states he usually goes to the [facility] for dialysis treatment, upset he was brought to [facility]. collateral obtained from supervisor, Yovana [phone], states pt has been noncompliant with meds for long time, not attending dialysis treatmetns. They are concenred pt has become more irritable, threatenign staff at times, sometimes hitting staff. Pt has not reported SI, no psychosis, fili.  SAD, schizophrenia  pt poor historian unable to verify history (19 Dec 2023 20:56)      Patient seen and evaluated at bedside. No acute events overnight except as noted.    Interval HPI: overnight events noted     PAST MEDICAL & SURGICAL HISTORY:  ESRD on dialysis      Schizophrenia          REVIEW OF SYSTEMS:  as per hpi       VITALS:   Vital Signs Last 24 Hrs  T(C): 36.4 (25 Dec 2023 12:45), Max: 36.4 (25 Dec 2023 00:17)  T(F): 97.5 (25 Dec 2023 12:45), Max: 97.5 (25 Dec 2023 00:17)  HR: 89 (25 Dec 2023 12:45) (81 - 89)  BP: 130/69 (25 Dec 2023 12:45) (130/69 - 150/67)  BP(mean): --  RR: 18 (25 Dec 2023 12:45) (18 - 18)  SpO2: 100% (25 Dec 2023 12:45) (100% - 100%)    Parameters below as of 25 Dec 2023 12:45  Patient On (Oxygen Delivery Method): room air          PHYSICAL EXAM:  GENERAL: NAD, well-developed,    HEAD:  Atraumatic, Normocephalic  EYES: EOMI, PERRLA, conjunctiva and sclera clear  NECK: Supple, No JVD  CHEST/LUNG: Clear to auscultation bilaterally; No wheeze  HEART: S1, S2; No murmurs, rubs, or gallops  ABDOMEN: Soft, Nontender, Nondistended; Bowel sounds present  EXTREMITIES:  2+ Peripheral Pulses, No clubbing, cyanosis, or edema  PSYCH: Normal affect  NEUROLOGY: AAOX2-3; non-focal  SKIN: No rashes or lesions    Consultant(s) Notes Reviewed:  [x ] YES  [ ] NO  Care Discussed with Consultants/Other Providers [ x] YES  [ ] NO    MEDS:   MEDICATIONS  (STANDING):  chlorhexidine 2% Cloths 1 Application(s) Topical daily  clopidogrel Tablet 75 milliGRAM(s) Oral daily  dextrose 5%. 1000 milliLiter(s) (100 mL/Hr) IV Continuous <Continuous>  dextrose 5%. 1000 milliLiter(s) (50 mL/Hr) IV Continuous <Continuous>  dextrose 50% Injectable 25 Gram(s) IV Push once  dextrose 50% Injectable 12.5 Gram(s) IV Push once  dextrose 50% Injectable 25 Gram(s) IV Push once  divalproex  milliGRAM(s) Oral two times a day  finasteride 5 milliGRAM(s) Oral daily  glucagon  Injectable 1 milliGRAM(s) IntraMuscular once  haloperidol     Tablet 2 milliGRAM(s) Oral at bedtime  hydrALAZINE 25 milliGRAM(s) Oral every 8 hours  insulin glargine Injectable (LANTUS) 8 Unit(s) SubCutaneous at bedtime  insulin lispro (ADMELOG) corrective regimen sliding scale   SubCutaneous three times a day before meals  isosorbide   mononitrate ER Tablet (IMDUR) 30 milliGRAM(s) Oral daily  metoprolol tartrate 100 milliGRAM(s) Oral daily  tamsulosin 0.4 milliGRAM(s) Oral at bedtime  traZODone 100 milliGRAM(s) Oral at bedtime    MEDICATIONS  (PRN):  dextrose Oral Gel 15 Gram(s) Oral once PRN Blood Glucose LESS THAN 70 milliGRAM(s)/deciliter        ALLERGIES:  Allergy Status Unknown      LABS:       TSH: Thyroid Stimulating Hormone, Serum: 2.59 uIU/mL (12-19 @ 15:21)     < from: US Kidney and Bladder (12.19.23 @ 23:39) >  IMPRESSION:  1.  No hydronephrosis.  2.  Increased echogenicity compatible with medical renal disease.  3.  Layering debris within the urinary bladder. Correlate with urinalysis.    --- End of Report ---        < end of copied text >

## 2023-12-25 NOTE — PROVIDER CONTACT NOTE (OTHER) - ASSESSMENT
Pt agitated, code grey called. Pt pulled out IV during code grey- PA aware. Security, nurse managers and PA at bedside.
Pt is stable, resting in bed, restless, agitated when spoken too, no visible signs of distress, pt denies chest pain/SOB. Pt stated to RN: "Get the fuck out, leave me alone, I don't take insulin or other meds." BRAN Maza notified.
Pt is stable, agitated and restless, no visible signs of distress, pt denies chest pain/SOB.
Pt is axo3, asymptomatic of any hypertensive signs & symptoms. Pt bp elevated systolic 200s. NP notified and made aware

## 2023-12-25 NOTE — PROVIDER CONTACT NOTE (OTHER) - ACTION/TREATMENT ORDERED:
Meds to be held unitl 8pm, per BRAN Maza.
Code grey called, PA aware. Stat IM haldol ordered and administered. 6am depakote administered as per provider order.
Insulin and plavix to be held, hydralazine and imdur to be rescheduled and attempt to give later, as per BRAN Maza.
Hydralazine 25mg was given. NP made aware. Continue to monitor for blood pressure.

## 2023-12-25 NOTE — PROVIDER CONTACT NOTE (OTHER) - REASON
Hypertension
Pt extremely agitated
Pt refusing to take/be given any meds, pt becomes agitated when spoken to, NP Ladarius Maza notified.
Pt refused 12pm insulin and PO meds, pt educated, NP Link notified.

## 2023-12-25 NOTE — PROVIDER CONTACT NOTE (OTHER) - BACKGROUND
Pt Vitals have been showing elevated blood pressure.
Pt admitted with renal failure
Pt admitted with renal failure
Pt admitted for renal failure. Has h/o schizophrenia & ESRD but is noncompliant with HD.

## 2023-12-25 NOTE — PROVIDER CONTACT NOTE (OTHER) - SITUATION
Pt extremely agitated, threatening staff
Pt refusing to take/be given any meds, pt becomes agitated when spoken to, NP Ladarius Maza notified.
Pt stated to RN: "Get the fuck out, leave me alone, I don't take insulin or other meds." BRAN Maza notified. Pt educated, meds held as per BRAN Maza.
Pt Vitals have been showing elevated blood pressure.

## 2023-12-26 LAB
ANION GAP SERPL CALC-SCNC: 13 MMOL/L — SIGNIFICANT CHANGE UP (ref 5–17)
ANION GAP SERPL CALC-SCNC: 13 MMOL/L — SIGNIFICANT CHANGE UP (ref 5–17)
BUN SERPL-MCNC: 100 MG/DL — HIGH (ref 7–23)
BUN SERPL-MCNC: 100 MG/DL — HIGH (ref 7–23)
CALCIUM SERPL-MCNC: 8.8 MG/DL — SIGNIFICANT CHANGE UP (ref 8.4–10.5)
CALCIUM SERPL-MCNC: 8.8 MG/DL — SIGNIFICANT CHANGE UP (ref 8.4–10.5)
CHLORIDE SERPL-SCNC: 107 MMOL/L — SIGNIFICANT CHANGE UP (ref 96–108)
CHLORIDE SERPL-SCNC: 107 MMOL/L — SIGNIFICANT CHANGE UP (ref 96–108)
CO2 SERPL-SCNC: 15 MMOL/L — LOW (ref 22–31)
CO2 SERPL-SCNC: 15 MMOL/L — LOW (ref 22–31)
CREAT SERPL-MCNC: 5.04 MG/DL — HIGH (ref 0.5–1.3)
CREAT SERPL-MCNC: 5.04 MG/DL — HIGH (ref 0.5–1.3)
EGFR: 11 ML/MIN/1.73M2 — LOW
EGFR: 11 ML/MIN/1.73M2 — LOW
GLUCOSE BLDC GLUCOMTR-MCNC: 133 MG/DL — HIGH (ref 70–99)
GLUCOSE BLDC GLUCOMTR-MCNC: 133 MG/DL — HIGH (ref 70–99)
GLUCOSE BLDC GLUCOMTR-MCNC: 148 MG/DL — HIGH (ref 70–99)
GLUCOSE BLDC GLUCOMTR-MCNC: 148 MG/DL — HIGH (ref 70–99)
GLUCOSE BLDC GLUCOMTR-MCNC: 160 MG/DL — HIGH (ref 70–99)
GLUCOSE BLDC GLUCOMTR-MCNC: 160 MG/DL — HIGH (ref 70–99)
GLUCOSE BLDC GLUCOMTR-MCNC: 89 MG/DL — SIGNIFICANT CHANGE UP (ref 70–99)
GLUCOSE BLDC GLUCOMTR-MCNC: 89 MG/DL — SIGNIFICANT CHANGE UP (ref 70–99)
GLUCOSE SERPL-MCNC: 93 MG/DL — SIGNIFICANT CHANGE UP (ref 70–99)
GLUCOSE SERPL-MCNC: 93 MG/DL — SIGNIFICANT CHANGE UP (ref 70–99)
HCT VFR BLD CALC: 31 % — LOW (ref 39–50)
HCT VFR BLD CALC: 31 % — LOW (ref 39–50)
HGB BLD-MCNC: 9.7 G/DL — LOW (ref 13–17)
HGB BLD-MCNC: 9.7 G/DL — LOW (ref 13–17)
MCHC RBC-ENTMCNC: 28 PG — SIGNIFICANT CHANGE UP (ref 27–34)
MCHC RBC-ENTMCNC: 28 PG — SIGNIFICANT CHANGE UP (ref 27–34)
MCHC RBC-ENTMCNC: 31.3 GM/DL — LOW (ref 32–36)
MCHC RBC-ENTMCNC: 31.3 GM/DL — LOW (ref 32–36)
MCV RBC AUTO: 89.6 FL — SIGNIFICANT CHANGE UP (ref 80–100)
MCV RBC AUTO: 89.6 FL — SIGNIFICANT CHANGE UP (ref 80–100)
NRBC # BLD: 0 /100 WBCS — SIGNIFICANT CHANGE UP (ref 0–0)
NRBC # BLD: 0 /100 WBCS — SIGNIFICANT CHANGE UP (ref 0–0)
PLATELET # BLD AUTO: 137 K/UL — LOW (ref 150–400)
PLATELET # BLD AUTO: 137 K/UL — LOW (ref 150–400)
POTASSIUM SERPL-MCNC: 5.3 MMOL/L — SIGNIFICANT CHANGE UP (ref 3.5–5.3)
POTASSIUM SERPL-MCNC: 5.3 MMOL/L — SIGNIFICANT CHANGE UP (ref 3.5–5.3)
POTASSIUM SERPL-SCNC: 5.3 MMOL/L — SIGNIFICANT CHANGE UP (ref 3.5–5.3)
POTASSIUM SERPL-SCNC: 5.3 MMOL/L — SIGNIFICANT CHANGE UP (ref 3.5–5.3)
RBC # BLD: 3.46 M/UL — LOW (ref 4.2–5.8)
RBC # BLD: 3.46 M/UL — LOW (ref 4.2–5.8)
RBC # FLD: 14.4 % — SIGNIFICANT CHANGE UP (ref 10.3–14.5)
RBC # FLD: 14.4 % — SIGNIFICANT CHANGE UP (ref 10.3–14.5)
SODIUM SERPL-SCNC: 135 MMOL/L — SIGNIFICANT CHANGE UP (ref 135–145)
SODIUM SERPL-SCNC: 135 MMOL/L — SIGNIFICANT CHANGE UP (ref 135–145)
WBC # BLD: 4.37 K/UL — SIGNIFICANT CHANGE UP (ref 3.8–10.5)
WBC # BLD: 4.37 K/UL — SIGNIFICANT CHANGE UP (ref 3.8–10.5)
WBC # FLD AUTO: 4.37 K/UL — SIGNIFICANT CHANGE UP (ref 3.8–10.5)
WBC # FLD AUTO: 4.37 K/UL — SIGNIFICANT CHANGE UP (ref 3.8–10.5)

## 2023-12-26 PROCEDURE — 99232 SBSQ HOSP IP/OBS MODERATE 35: CPT

## 2023-12-26 RX ORDER — SODIUM BICARBONATE 1 MEQ/ML
650 SYRINGE (ML) INTRAVENOUS THREE TIMES A DAY
Refills: 0 | Status: DISCONTINUED | OUTPATIENT
Start: 2023-12-26 | End: 2023-12-28

## 2023-12-26 RX ADMIN — INSULIN GLARGINE 8 UNIT(S): 100 INJECTION, SOLUTION SUBCUTANEOUS at 21:47

## 2023-12-26 RX ADMIN — Medication 25 MILLIGRAM(S): at 05:26

## 2023-12-26 RX ADMIN — CLOPIDOGREL BISULFATE 75 MILLIGRAM(S): 75 TABLET, FILM COATED ORAL at 12:27

## 2023-12-26 RX ADMIN — DIVALPROEX SODIUM 500 MILLIGRAM(S): 500 TABLET, DELAYED RELEASE ORAL at 18:20

## 2023-12-26 RX ADMIN — DIVALPROEX SODIUM 500 MILLIGRAM(S): 500 TABLET, DELAYED RELEASE ORAL at 05:26

## 2023-12-26 RX ADMIN — TAMSULOSIN HYDROCHLORIDE 0.4 MILLIGRAM(S): 0.4 CAPSULE ORAL at 21:47

## 2023-12-26 RX ADMIN — Medication 100 MILLIGRAM(S): at 21:47

## 2023-12-26 RX ADMIN — Medication 25 MILLIGRAM(S): at 21:47

## 2023-12-26 RX ADMIN — ISOSORBIDE MONONITRATE 30 MILLIGRAM(S): 60 TABLET, EXTENDED RELEASE ORAL at 12:27

## 2023-12-26 RX ADMIN — Medication 650 MILLIGRAM(S): at 14:52

## 2023-12-26 RX ADMIN — Medication 100 MILLIGRAM(S): at 05:26

## 2023-12-26 RX ADMIN — FINASTERIDE 5 MILLIGRAM(S): 5 TABLET, FILM COATED ORAL at 12:27

## 2023-12-26 RX ADMIN — Medication 25 MILLIGRAM(S): at 14:51

## 2023-12-26 RX ADMIN — CHLORHEXIDINE GLUCONATE 1 APPLICATION(S): 213 SOLUTION TOPICAL at 12:29

## 2023-12-26 NOTE — DISCHARGE NOTE NURSING/CASE MANAGEMENT/SOCIAL WORK - NSDCPEFALRISK_GEN_ALL_CORE
For information on Fall & Injury Prevention, visit: [email or web address] OR  [email or web address] OR  https://www.cdc.gov/steadi/patient.html For information on Fall & Injury Prevention, visit: [email or web address] OR  [email or web address] OR  https://www.cdc.gov/steadi/patient.html

## 2023-12-26 NOTE — BH CONSULTATION LIAISON PROGRESS NOTE - NSBHASSESSMENTFT_PSY_ALL_CORE
Pt is a 79 y/o male, hx Schizophrenia, dementia, in current psych tx at nursing home [facility] Dr. Dupree, on depakote , trazodone, bib EMS for agitation at shelter, was threatening to hurt staff with bat. Pt poor historian, talking very loudly at baseline, yelling. Pt denies being agitated at the nursing home, denies threatening staff. Pt denies depression, anxiety, fili, psychosis. pt reports fair sleep, appetite. pt repeatedly stated he just wants to go home. Pt states he usually goes to the [facility] for dialysis treatment, upset he was brought to [facility].    Patient continues to be intermittently agitated overnight. Haldol 2mg p.o. at bedtime (standing) if QTc <500ms. Continue valproate, please obtain levels. Pt is a 77 y/o male, hx Schizophrenia, dementia, in current psych tx at nursing home [facility] Dr. Dupree, on depakote , trazodone, bib EMS for agitation at [facility], was threatening to hurt staff with bat. Pt poor historian, talking very loudly at baseline, yelling. Pt denies being agitated at the nursing home, denies threatening staff. Pt denies depression, anxiety, fili, psychosis. pt reports fair sleep, appetite. pt repeatedly stated he just wants to go home. Pt states he usually goes to the [facility] for dialysis treatment, upset he was brought to [facility].    Patient continues to be intermittently agitated overnight. Haldol 2mg p.o. at bedtime (standing) if QTc <500ms. Continue valproate, please obtain levels.

## 2023-12-26 NOTE — DISCHARGE NOTE NURSING/CASE MANAGEMENT/SOCIAL WORK - PATIENT PORTAL LINK FT
You can access the FollowMyHealth Patient Portal offered by [facility] by registering at the following website: [email or web address]. By joining [employer]’s FollowMyHealth portal, you will also be able to view your health information using other applications (apps) compatible with our system. You can access the FollowMyHealth Patient Portal offered by [facility] by registering at the following website: [email or web address]. By joining [employer]’s FollowMyHealth portal, you will also be able to view your health information using other applications (apps) compatible with our system.

## 2023-12-26 NOTE — PROGRESS NOTE ADULT - PROBLEM SELECTOR PLAN 1
Pt with kidney failure in the setting of uncontrolled hypertension duration and etiology unknown at this time. On review of labs on [place], no prior labs available. Admission Scr was 4.46 with eGFR of 13 on 12/19/23. UA unremarkable and ultrasound with no obstruction.  No labs since 12/19.  At present there is no emergency indication for dialysis although would need labs to assess.  Primary team has been in touch with facility and patient corroborated that he is being "worked up" for dialysis but has not started.  I have ordered labs or this morning and spoken with staff on floor. Pt with kidney failure in the setting of uncontrolled hypertension duration and etiology unknown at this time. On review of labs on [place], no prior labs available. Admission Scr was 4.46 with eGFR of 13 on 12/19/23. UA unremarkable and ultrasound with no obstruction.  No labs since 12/19.  At present there is no emergency indication for dialysis although would need labs to assess.  Primary team has been in touch with facility and patient corroborated that he is being "worked up" for dialysis but has not started.  I have ordered labs or this morning and spoken with staff on floor.

## 2023-12-26 NOTE — PROGRESS NOTE ADULT - SUBJECTIVE AND OBJECTIVE BOX
MILTON ARIAS  78y Male  MRN:[id number]    Patient is a 78y old  Male who presents with a chief complaint of   HPI:  Pt is a 79 y/o male, hx Schizophrenia, dementia, in current psych tx at [facility] Dr. Dupree, on depakote , trazodone, bib ems for agitation at jail, was threatening to hurt staff with bat. Pt poor historian, talking very loudly at baseline, yelling. Pt denies being agitated at the nursing home, denies threatening staff. Pt denies depression, anxiety, fili, psychosis. pt reports fair sleep, appetite. pt repeatedly stated he just wants to go home. Pt states he usually goes to the [facility] for dialysis treatment, upset he was brought to [facility]. collateral obtained from supervisor, Yovana [phone], states pt has been noncompliant with meds for long time, not attending dialysis treatmetns. They are concenred pt has become more irritable, threatenign staff at times, sometimes hitting staff. Pt has not reported SI, no psychosis, fili.  SAD, schizophrenia  pt poor historian unable to verify history (19 Dec 2023 20:56)      Patient seen and evaluated at bedside. No acute events overnight except as noted.    Interval HPI: overnight events noted     PAST MEDICAL & SURGICAL HISTORY:  ESRD on dialysis      Schizophrenia          REVIEW OF SYSTEMS:  as per hpi       VITALS:   Vital Signs Last 24 Hrs  T(C): 36.6 (26 Dec 2023 07:11), Max: 36.6 (26 Dec 2023 07:11)  T(F): 97.8 (26 Dec 2023 07:11), Max: 97.8 (26 Dec 2023 07:11)  HR: 67 (26 Dec 2023 07:11) (67 - 75)  BP: 134/77 (26 Dec 2023 07:11) (134/77 - 159/78)  BP(mean): --  RR: 18 (26 Dec 2023 07:11) (18 - 18)  SpO2: 100% (26 Dec 2023 07:11) (100% - 100%)    Parameters below as of 26 Dec 2023 07:11  Patient On (Oxygen Delivery Method): room air        PHYSICAL EXAM:  GENERAL: NAD, well-developed,    HEAD:  Atraumatic, Normocephalic  EYES: EOMI, PERRLA, conjunctiva and sclera clear  NECK: Supple, No JVD  CHEST/LUNG: Clear to auscultation bilaterally; No wheeze  HEART: S1, S2; No murmurs, rubs, or gallops  ABDOMEN: Soft, Nontender, Nondistended; Bowel sounds present  EXTREMITIES:  2+ Peripheral Pulses, No clubbing, cyanosis, or edema  PSYCH: Normal affect  NEUROLOGY: AAOX2-3; non-focal  SKIN: No rashes or lesions    Consultant(s) Notes Reviewed:  [x ] YES  [ ] NO  Care Discussed with Consultants/Other Providers [ x] YES  [ ] NO    MEDS:   MEDICATIONS  (STANDING):  chlorhexidine 2% Cloths 1 Application(s) Topical daily  clopidogrel Tablet 75 milliGRAM(s) Oral daily  dextrose 5%. 1000 milliLiter(s) (50 mL/Hr) IV Continuous <Continuous>  dextrose 5%. 1000 milliLiter(s) (100 mL/Hr) IV Continuous <Continuous>  dextrose 50% Injectable 25 Gram(s) IV Push once  dextrose 50% Injectable 25 Gram(s) IV Push once  dextrose 50% Injectable 12.5 Gram(s) IV Push once  divalproex  milliGRAM(s) Oral two times a day  finasteride 5 milliGRAM(s) Oral daily  glucagon  Injectable 1 milliGRAM(s) IntraMuscular once  haloperidol     Tablet 2 milliGRAM(s) Oral at bedtime  hydrALAZINE 25 milliGRAM(s) Oral every 8 hours  insulin glargine Injectable (LANTUS) 8 Unit(s) SubCutaneous at bedtime  insulin lispro (ADMELOG) corrective regimen sliding scale   SubCutaneous three times a day before meals  isosorbide   mononitrate ER Tablet (IMDUR) 30 milliGRAM(s) Oral daily  metoprolol tartrate 100 milliGRAM(s) Oral daily  tamsulosin 0.4 milliGRAM(s) Oral at bedtime  traZODone 100 milliGRAM(s) Oral at bedtime    MEDICATIONS  (PRN):  artificial tears (preservative free) Ophthalmic Solution 1 Drop(s) Both EYES four times a day PRN Dry Eyes  dextrose Oral Gel 15 Gram(s) Oral once PRN Blood Glucose LESS THAN 70 milliGRAM(s)/deciliter        ALLERGIES:  Allergy Status Unknown      LABS:                         9.7    4.37  )-----------( 137      ( 26 Dec 2023 09:51 )             31.0   12-26    135  |  107  |  100<H>  ----------------------------<  93  5.3   |  15<L>  |  5.04<H>    Ca    8.8      26 Dec 2023 09:51        TSH: Thyroid Stimulating Hormone, Serum: 2.59 uIU/mL (12-19 @ 15:21)     < from: US Kidney and Bladder (12.19.23 @ 23:39) >  IMPRESSION:  1.  No hydronephrosis.  2.  Increased echogenicity compatible with medical renal disease.  3.  Layering debris within the urinary bladder. Correlate with urinalysis.    --- End of Report ---        < end of copied text >   MILTON ARIAS  78y Male  MRN:[id number]    Patient is a 78y old  Male who presents with a chief complaint of   HPI:  Pt is a 77 y/o male, hx Schizophrenia, dementia, in current psych tx at [facility] Dr. Dupree, on depakote , trazodone, bib ems for agitation at [facility], was threatening to hurt staff with bat. Pt poor historian, talking very loudly at baseline, yelling. Pt denies being agitated at the nursing home, denies threatening staff. Pt denies depression, anxiety, fili, psychosis. pt reports fair sleep, appetite. pt repeatedly stated he just wants to go home. Pt states he usually goes to the [facility] for dialysis treatment, upset he was brought to [facility]. collateral obtained from supervisor, Yovana [phone], states pt has been noncompliant with meds for long time, not attending dialysis treatmetns. They are concenred pt has become more irritable, threatenign staff at times, sometimes hitting staff. Pt has not reported SI, no psychosis, fili.  SAD, schizophrenia  pt poor historian unable to verify history (19 Dec 2023 20:56)      Patient seen and evaluated at bedside. No acute events overnight except as noted.    Interval HPI: overnight events noted     PAST MEDICAL & SURGICAL HISTORY:  ESRD on dialysis      Schizophrenia          REVIEW OF SYSTEMS:  as per hpi       VITALS:   Vital Signs Last 24 Hrs  T(C): 36.6 (26 Dec 2023 07:11), Max: 36.6 (26 Dec 2023 07:11)  T(F): 97.8 (26 Dec 2023 07:11), Max: 97.8 (26 Dec 2023 07:11)  HR: 67 (26 Dec 2023 07:11) (67 - 75)  BP: 134/77 (26 Dec 2023 07:11) (134/77 - 159/78)  BP(mean): --  RR: 18 (26 Dec 2023 07:11) (18 - 18)  SpO2: 100% (26 Dec 2023 07:11) (100% - 100%)    Parameters below as of 26 Dec 2023 07:11  Patient On (Oxygen Delivery Method): room air        PHYSICAL EXAM:  GENERAL: NAD, well-developed,    HEAD:  Atraumatic, Normocephalic  EYES: EOMI, PERRLA, conjunctiva and sclera clear  NECK: Supple, No JVD  CHEST/LUNG: Clear to auscultation bilaterally; No wheeze  HEART: S1, S2; No murmurs, rubs, or gallops  ABDOMEN: Soft, Nontender, Nondistended; Bowel sounds present  EXTREMITIES:  2+ Peripheral Pulses, No clubbing, cyanosis, or edema  PSYCH: Normal affect  NEUROLOGY: AAOX2-3; non-focal  SKIN: No rashes or lesions    Consultant(s) Notes Reviewed:  [x ] YES  [ ] NO  Care Discussed with Consultants/Other Providers [ x] YES  [ ] NO    MEDS:   MEDICATIONS  (STANDING):  chlorhexidine 2% Cloths 1 Application(s) Topical daily  clopidogrel Tablet 75 milliGRAM(s) Oral daily  dextrose 5%. 1000 milliLiter(s) (50 mL/Hr) IV Continuous <Continuous>  dextrose 5%. 1000 milliLiter(s) (100 mL/Hr) IV Continuous <Continuous>  dextrose 50% Injectable 25 Gram(s) IV Push once  dextrose 50% Injectable 25 Gram(s) IV Push once  dextrose 50% Injectable 12.5 Gram(s) IV Push once  divalproex  milliGRAM(s) Oral two times a day  finasteride 5 milliGRAM(s) Oral daily  glucagon  Injectable 1 milliGRAM(s) IntraMuscular once  haloperidol     Tablet 2 milliGRAM(s) Oral at bedtime  hydrALAZINE 25 milliGRAM(s) Oral every 8 hours  insulin glargine Injectable (LANTUS) 8 Unit(s) SubCutaneous at bedtime  insulin lispro (ADMELOG) corrective regimen sliding scale   SubCutaneous three times a day before meals  isosorbide   mononitrate ER Tablet (IMDUR) 30 milliGRAM(s) Oral daily  metoprolol tartrate 100 milliGRAM(s) Oral daily  tamsulosin 0.4 milliGRAM(s) Oral at bedtime  traZODone 100 milliGRAM(s) Oral at bedtime    MEDICATIONS  (PRN):  artificial tears (preservative free) Ophthalmic Solution 1 Drop(s) Both EYES four times a day PRN Dry Eyes  dextrose Oral Gel 15 Gram(s) Oral once PRN Blood Glucose LESS THAN 70 milliGRAM(s)/deciliter        ALLERGIES:  Allergy Status Unknown      LABS:                         9.7    4.37  )-----------( 137      ( 26 Dec 2023 09:51 )             31.0   12-26    135  |  107  |  100<H>  ----------------------------<  93  5.3   |  15<L>  |  5.04<H>    Ca    8.8      26 Dec 2023 09:51        TSH: Thyroid Stimulating Hormone, Serum: 2.59 uIU/mL (12-19 @ 15:21)     < from: US Kidney and Bladder (12.19.23 @ 23:39) >  IMPRESSION:  1.  No hydronephrosis.  2.  Increased echogenicity compatible with medical renal disease.  3.  Layering debris within the urinary bladder. Correlate with urinalysis.    --- End of Report ---        < end of copied text >

## 2023-12-26 NOTE — BH CONSULTATION LIAISON PROGRESS NOTE - NSBHCONSULTFOLLOWAFTERCARE_PSY_A_CORE FT
f/u with outpt psychiarist Dr. Toledo at [facility]  f/u with outpt psychiarist Dr. Toledo at [facility]

## 2023-12-26 NOTE — PROGRESS NOTE ADULT - SUBJECTIVE AND OBJECTIVE BOX
[facility] Division of Kidney Diseases & Hypertension  FOLLOW UP NOTE  --------------------------------------------------------------------------------  Chief Complaint: altered mentation agitation    24 hour events/subjective: Patient was seen and evaluated at bedside this morning.  No chest pain reports peeing a lot and pooping a lot.  No complaints otherwise still wants to go home.    PAST HISTORY  --------------------------------------------------------------------------------  No significant changes to PMH, PSH, FHx, SHx, unless otherwise noted    ALLERGIES & MEDICATIONS  --------------------------------------------------------------------------------  Allergies    Allergy Status Unknown    Intolerances      Standing Inpatient Medications  chlorhexidine 2% Cloths 1 Application(s) Topical daily  clopidogrel Tablet 75 milliGRAM(s) Oral daily  dextrose 5%. 1000 milliLiter(s) IV Continuous <Continuous>  dextrose 5%. 1000 milliLiter(s) IV Continuous <Continuous>  dextrose 50% Injectable 12.5 Gram(s) IV Push once  dextrose 50% Injectable 25 Gram(s) IV Push once  dextrose 50% Injectable 25 Gram(s) IV Push once  divalproex  milliGRAM(s) Oral two times a day  finasteride 5 milliGRAM(s) Oral daily  glucagon  Injectable 1 milliGRAM(s) IntraMuscular once  haloperidol     Tablet 2 milliGRAM(s) Oral at bedtime  hydrALAZINE 25 milliGRAM(s) Oral every 8 hours  insulin glargine Injectable (LANTUS) 8 Unit(s) SubCutaneous at bedtime  insulin lispro (ADMELOG) corrective regimen sliding scale   SubCutaneous three times a day before meals  isosorbide   mononitrate ER Tablet (IMDUR) 30 milliGRAM(s) Oral daily  metoprolol tartrate 100 milliGRAM(s) Oral daily  tamsulosin 0.4 milliGRAM(s) Oral at bedtime  traZODone 100 milliGRAM(s) Oral at bedtime    PRN Inpatient Medications  artificial tears (preservative free) Ophthalmic Solution 1 Drop(s) Both EYES four times a day PRN  dextrose Oral Gel 15 Gram(s) Oral once PRN      REVIEW OF SYSTEMS  --------------------------------------------------------------------------------  Gen: no fever  Respiratory: no sob  CV: no cp  GI: no ab pain  : as per hpi  MSK: no complaints    VITALS/PHYSICAL EXAM  --------------------------------------------------------------------------------  T(C): 36.6 (12-26-23 @ 07:11), Max: 36.6 (12-26-23 @ 07:11)  HR: 67 (12-26-23 @ 07:11) (67 - 89)  BP: 134/77 (12-26-23 @ 07:11) (130/69 - 159/78)  ABP: --  ABP(mean): --  RR: 18 (12-26-23 @ 07:11) (18 - 18)  SpO2: 100% (12-26-23 @ 07:11) (100% - 100%)  CVP(mm Hg): --        Physical Exam:  	Gen: no distress  	HEENT: anicteric  	Pulm: CTA B/L  	CV: RRR, S1S2; no rub  	Abd: soft  	: no fullness  	LE: no edema  	Neuro: awake and alert  	Psych: Normal affect and mood  	Skin: no dialysis access noted  	Vascular access: none    LABS/STUDIES  --------------------------------------------------------------------------------                Creatinine Trend:  SCr 4.46 [12-19 @ 15:21]    Urinalysis - [12-19-23 @ 15:51]      Color Yellow / Appearance Clear / SG 1.009 / pH 6.5      Gluc Negative / Ketone Negative  / Bili Negative / Urobili 0.2       Blood Trace / Protein 30 / Leuk Est Negative / Nitrite Negative      RBC 0 / WBC 1 / Hyaline  / Gran  / Sq Epi  / Non Sq Epi 0 / Bacteria Negative      TSH 2.59      [12-19-23 @ 15:21]       [facility] Division of Kidney Diseases & Hypertension  FOLLOW UP NOTE  --------------------------------------------------------------------------------  Chief Complaint: altered mentation agitation    24 hour events/subjective: Patient was seen and evaluated at bedside this morning.  No chest pain reports peeing a lot and pooping a lot.  No complaints otherwise still wants to go home.    PAST HISTORY  --------------------------------------------------------------------------------  No significant changes to PMH, PSH, FHx, SHx, unless otherwise noted    ALLERGIES & MEDICATIONS  --------------------------------------------------------------------------------  Allergies    Allergy Status Unknown    Intolerances      Standing Inpatient Medications  chlorhexidine 2% Cloths 1 Application(s) Topical daily  clopidogrel Tablet 75 milliGRAM(s) Oral daily  dextrose 5%. 1000 milliLiter(s) IV Continuous <Continuous>  dextrose 5%. 1000 milliLiter(s) IV Continuous <Continuous>  dextrose 50% Injectable 12.5 Gram(s) IV Push once  dextrose 50% Injectable 25 Gram(s) IV Push once  dextrose 50% Injectable 25 Gram(s) IV Push once  divalproex  milliGRAM(s) Oral two times a day  finasteride 5 milliGRAM(s) Oral daily  glucagon  Injectable 1 milliGRAM(s) IntraMuscular once  haloperidol     Tablet 2 milliGRAM(s) Oral at bedtime  hydrALAZINE 25 milliGRAM(s) Oral every 8 hours  insulin glargine Injectable (LANTUS) 8 Unit(s) SubCutaneous at bedtime  insulin lispro (ADMELOG) corrective regimen sliding scale   SubCutaneous three times a day before meals  isosorbide   mononitrate ER Tablet (IMDUR) 30 milliGRAM(s) Oral daily  metoprolol tartrate 100 milliGRAM(s) Oral daily  tamsulosin 0.4 milliGRAM(s) Oral at bedtime  traZODone 100 milliGRAM(s) Oral at bedtime    PRN Inpatient Medications  artificial tears (preservative free) Ophthalmic Solution 1 Drop(s) Both EYES four times a day PRN  dextrose Oral Gel 15 Gram(s) Oral once PRN      REVIEW OF SYSTEMS  --------------------------------------------------------------------------------  Gen: no fever  Respiratory: no sob  CV: no cp  GI: no ab pain  : as per hpi  MSK: no complaints    VITALS/PHYSICAL EXAM  --------------------------------------------------------------------------------  T(C): 36.6 (12-26-23 @ 07:11), Max: 36.6 (12-26-23 @ 07:11)  HR: 67 (12-26-23 @ 07:11) (67 - 89)  BP: 134/77 (12-26-23 @ 07:11) (130/69 - 159/78)  ABP: --  ABP(mean): --  RR: 18 (12-26-23 @ 07:11) (18 - 18)  SpO2: 100% (12-26-23 @ 07:11) (100% - 100%)  CVP(mm Hg): --        Physical Exam:  	Gen: no distress  	HEENT: anicteric  	Pulm: CTA B/L  	CV: RRR, S1S2; no rub  	Abd: soft  	: no fullness  	LE: no edema  	Neuro: awake and alert  	Psych: Normal affect and mood  	Skin: no dialysis access noted  	Vascular access: none    LABS/STUDIES  --------------------------------------------------------------------------------                Creatinine Trend:  SCr 4.46 [12-19 @ 15:21]    Urinalysis - [12-19-23 @ 15:51]      Color Yellow / Appearance Clear / SG 1.009 / pH 6.5      Gluc Negative / Ketone Negative  / Bili Negative / Urobili 0.2       Blood Trace / Protein 30 / Leuk Est Negative / Nitrite Negative      RBC 0 / WBC 1 / Hyaline  / Gran  / Sq Epi  / Non Sq Epi 0 / Bacteria Negative      TSH 2.59      [12-19-23 @ 15:21]

## 2023-12-27 LAB
GLUCOSE BLDC GLUCOMTR-MCNC: 98 MG/DL — SIGNIFICANT CHANGE UP (ref 70–99)

## 2023-12-27 PROCEDURE — 99232 SBSQ HOSP IP/OBS MODERATE 35: CPT

## 2023-12-27 RX ORDER — SODIUM BICARBONATE 1 MEQ/ML
1 SYRINGE (ML) INTRAVENOUS
Qty: 0 | Refills: 0 | DISCHARGE
Start: 2023-12-27

## 2023-12-27 RX ORDER — HALOPERIDOL DECANOATE 100 MG/ML
1 INJECTION INTRAMUSCULAR
Qty: 0 | Refills: 0 | DISCHARGE
Start: 2023-12-27

## 2023-12-27 RX ADMIN — DIVALPROEX SODIUM 500 MILLIGRAM(S): 500 TABLET, DELAYED RELEASE ORAL at 05:33

## 2023-12-27 RX ADMIN — DIVALPROEX SODIUM 500 MILLIGRAM(S): 500 TABLET, DELAYED RELEASE ORAL at 18:10

## 2023-12-27 RX ADMIN — Medication 1 MILLIGRAM(S): at 01:03

## 2023-12-27 RX ADMIN — Medication 100 MILLIGRAM(S): at 05:33

## 2023-12-27 RX ADMIN — INSULIN GLARGINE 8 UNIT(S): 100 INJECTION, SOLUTION SUBCUTANEOUS at 23:46

## 2023-12-27 RX ADMIN — TAMSULOSIN HYDROCHLORIDE 0.4 MILLIGRAM(S): 0.4 CAPSULE ORAL at 23:44

## 2023-12-27 RX ADMIN — Medication 100 MILLIGRAM(S): at 23:44

## 2023-12-27 RX ADMIN — Medication 650 MILLIGRAM(S): at 23:43

## 2023-12-27 RX ADMIN — Medication 25 MILLIGRAM(S): at 23:44

## 2023-12-27 RX ADMIN — Medication 25 MILLIGRAM(S): at 05:33

## 2023-12-27 NOTE — CHART NOTE - NSCHARTNOTEFT_GEN_A_CORE
throughout the day I have made multiple call about the patient including to the patient's sister, to his nursing home, as well as to Ms Mamadou at the [facility].  I confirmed with Miss estrada that the patient has a history of CKD V and is being worked up for dialysis.  As per her report he comes consistently and never misses an appointment.  Since the patient has reliable outpatient follow up will defer in patient AVF or dialysis initiation.   Mamadou at the clinic will relay to the doctors at the clinic that plan will be for outpatient fistula. throughout the day I have made multiple call about the patient including to the patient's sister, to his nursing home, as well as to Ms Mamadou at the [facility].  I confirmed with Miss estrada that the patient has a history of CKD V and is being worked up for dialysis.  As per her report he comes consistently and never misses an appointment.  Since the patient has reliable outpatient follow up will defer in patient AVF or dialysis initiation.   Mamadou at the clinic will relay to the doctors at the clinic that plan will be for outpatient fistula.

## 2023-12-27 NOTE — DISCHARGE NOTE NURSING/CASE MANAGEMENT/SOCIAL WORK - NSDCPEFALRISK_GEN_ALL_CORE
For information on Fall & Injury Prevention, visit: [email or web address] OR  [email or web address] OR  https://www.cdc.gov/steadi/patient.html For information on Fall & Injury Prevention, visit: [email or web address] OR  [email or web address] OR  https://www.cdc.gov/steadi/patient.html

## 2023-12-27 NOTE — DISCHARGE NOTE NURSING/CASE MANAGEMENT/SOCIAL WORK - NSDCFUADDAPPT_GEN_ALL_CORE_FT
APPTS ARE READY TO BE MADE: [x] YES    Best Family or Patient Contact (if needed):    Additional Information about above appointments (if needed):    1:   2:   3:     Other comments or requests:   
APPTS ARE READY TO BE MADE: [x] YES    Best Family or Patient Contact (if needed):    Additional Information about above appointments (if needed):    1:   2:   3:     Other comments or requests:

## 2023-12-27 NOTE — PROGRESS NOTE ADULT - PROVIDER SPECIALTY LIST ADULT
Internal Medicine
Nephrology
Internal Medicine
Nephrology

## 2023-12-27 NOTE — PROGRESS NOTE ADULT - SUBJECTIVE AND OBJECTIVE BOX
MILTON ARIAS  78y Male  MRN:[id number]    Patient is a 78y old  Male who presents with a chief complaint of   HPI:  Pt is a 77 y/o male, hx Schizophrenia, dementia, in current psych tx at [facility] Dr. Dupree, on depakote , trazodone, bib ems for agitation at group home, was threatening to hurt staff with bat. Pt poor historian, talking very loudly at baseline, yelling. Pt denies being agitated at the nursing home, denies threatening staff. Pt denies depression, anxiety, fili, psychosis. pt reports fair sleep, appetite. pt repeatedly stated he just wants to go home. Pt states he usually goes to the [facility] for dialysis treatment, upset he was brought to [facility]. collateral obtained from supervisor, Yovana [phone], states pt has been noncompliant with meds for long time, not attending dialysis treatmetns. They are concenred pt has become more irritable, threatenign staff at times, sometimes hitting staff. Pt has not reported SI, no psychosis, fili.  SAD, schizophrenia  pt poor historian unable to verify history (19 Dec 2023 20:56)      Patient seen and evaluated at bedside. No acute events overnight except as noted.    Interval HPI: overnight events noted     PAST MEDICAL & SURGICAL HISTORY:  ESRD on dialysis      Schizophrenia          REVIEW OF SYSTEMS:  as per hpi       VITALS:   Vital Signs Last 24 Hrs  T(C): 36.5 (27 Dec 2023 05:21), Max: 36.8 (26 Dec 2023 14:36)  T(F): 97.7 (27 Dec 2023 05:21), Max: 98.2 (26 Dec 2023 14:36)  HR: 76 (27 Dec 2023 05:21) (66 - 76)  BP: 153/64 (27 Dec 2023 05:21) (122/68 - 153/64)  BP(mean): --  RR: 18 (27 Dec 2023 05:21) (18 - 18)  SpO2: 100% (27 Dec 2023 05:21) (100% - 100%)    Parameters below as of 27 Dec 2023 05:21  Patient On (Oxygen Delivery Method): room air          PHYSICAL EXAM:  GENERAL: NAD, well-developed,    HEAD:  Atraumatic, Normocephalic  EYES: EOMI, PERRLA, conjunctiva and sclera clear  NECK: Supple, No JVD  CHEST/LUNG: Clear to auscultation bilaterally; No wheeze  HEART: S1, S2; No murmurs, rubs, or gallops  ABDOMEN: Soft, Nontender, Nondistended; Bowel sounds present  EXTREMITIES:  2+ Peripheral Pulses, No clubbing, cyanosis, or edema  PSYCH: Normal affect  NEUROLOGY: AAOX2-3; non-focal  SKIN: No rashes or lesions    Consultant(s) Notes Reviewed:  [x ] YES  [ ] NO  Care Discussed with Consultants/Other Providers [ x] YES  [ ] NO    MEDS:   MEDICATIONS  (STANDING):  chlorhexidine 2% Cloths 1 Application(s) Topical daily  clopidogrel Tablet 75 milliGRAM(s) Oral daily  dextrose 5%. 1000 milliLiter(s) (50 mL/Hr) IV Continuous <Continuous>  dextrose 5%. 1000 milliLiter(s) (100 mL/Hr) IV Continuous <Continuous>  dextrose 50% Injectable 25 Gram(s) IV Push once  dextrose 50% Injectable 25 Gram(s) IV Push once  dextrose 50% Injectable 12.5 Gram(s) IV Push once  divalproex  milliGRAM(s) Oral two times a day  finasteride 5 milliGRAM(s) Oral daily  glucagon  Injectable 1 milliGRAM(s) IntraMuscular once  haloperidol     Tablet 2 milliGRAM(s) Oral at bedtime  hydrALAZINE 25 milliGRAM(s) Oral every 8 hours  insulin glargine Injectable (LANTUS) 8 Unit(s) SubCutaneous at bedtime  insulin lispro (ADMELOG) corrective regimen sliding scale   SubCutaneous three times a day before meals  isosorbide   mononitrate ER Tablet (IMDUR) 30 milliGRAM(s) Oral daily  metoprolol tartrate 100 milliGRAM(s) Oral daily  sodium bicarbonate 650 milliGRAM(s) Oral three times a day  tamsulosin 0.4 milliGRAM(s) Oral at bedtime  traZODone 100 milliGRAM(s) Oral at bedtime    MEDICATIONS  (PRN):  artificial tears (preservative free) Ophthalmic Solution 1 Drop(s) Both EYES four times a day PRN Dry Eyes  dextrose Oral Gel 15 Gram(s) Oral once PRN Blood Glucose LESS THAN 70 milliGRAM(s)/deciliter        ALLERGIES:  Allergy Status Unknown      LABS:                                 9.7    4.37  )-----------( 137      ( 26 Dec 2023 09:51 )             31.0   12-26    135  |  107  |  100<H>  ----------------------------<  93  5.3   |  15<L>  |  5.04<H>    Ca    8.8      26 Dec 2023 09:51          TSH: Thyroid Stimulating Hormone, Serum: 2.59 uIU/mL (12-19 @ 15:21)     < from: US Kidney and Bladder (12.19.23 @ 23:39) >  IMPRESSION:  1.  No hydronephrosis.  2.  Increased echogenicity compatible with medical renal disease.  3.  Layering debris within the urinary bladder. Correlate with urinalysis.    --- End of Report ---        < end of copied text >   MILTON ARIAS  78y Male  MRN:[id number]    Patient is a 78y old  Male who presents with a chief complaint of   HPI:  Pt is a 77 y/o male, hx Schizophrenia, dementia, in current psych tx at [facility] Dr. Dupree, on depakote , trazodone, bib ems for agitation at long-term, was threatening to hurt staff with bat. Pt poor historian, talking very loudly at baseline, yelling. Pt denies being agitated at the nursing home, denies threatening staff. Pt denies depression, anxiety, fili, psychosis. pt reports fair sleep, appetite. pt repeatedly stated he just wants to go home. Pt states he usually goes to the [facility] for dialysis treatment, upset he was brought to [facility]. collateral obtained from supervisor, Yovana [phone], states pt has been noncompliant with meds for long time, not attending dialysis treatmetns. They are concenred pt has become more irritable, threatenign staff at times, sometimes hitting staff. Pt has not reported SI, no psychosis, fili.  SAD, schizophrenia  pt poor historian unable to verify history (19 Dec 2023 20:56)      Patient seen and evaluated at bedside. No acute events overnight except as noted.    Interval HPI: overnight events noted     PAST MEDICAL & SURGICAL HISTORY:  ESRD on dialysis      Schizophrenia          REVIEW OF SYSTEMS:  as per hpi       VITALS:   Vital Signs Last 24 Hrs  T(C): 36.5 (27 Dec 2023 05:21), Max: 36.8 (26 Dec 2023 14:36)  T(F): 97.7 (27 Dec 2023 05:21), Max: 98.2 (26 Dec 2023 14:36)  HR: 76 (27 Dec 2023 05:21) (66 - 76)  BP: 153/64 (27 Dec 2023 05:21) (122/68 - 153/64)  BP(mean): --  RR: 18 (27 Dec 2023 05:21) (18 - 18)  SpO2: 100% (27 Dec 2023 05:21) (100% - 100%)    Parameters below as of 27 Dec 2023 05:21  Patient On (Oxygen Delivery Method): room air          PHYSICAL EXAM:  GENERAL: NAD, well-developed,    HEAD:  Atraumatic, Normocephalic  EYES: EOMI, PERRLA, conjunctiva and sclera clear  NECK: Supple, No JVD  CHEST/LUNG: Clear to auscultation bilaterally; No wheeze  HEART: S1, S2; No murmurs, rubs, or gallops  ABDOMEN: Soft, Nontender, Nondistended; Bowel sounds present  EXTREMITIES:  2+ Peripheral Pulses, No clubbing, cyanosis, or edema  PSYCH: Normal affect  NEUROLOGY: AAOX2-3; non-focal  SKIN: No rashes or lesions    Consultant(s) Notes Reviewed:  [x ] YES  [ ] NO  Care Discussed with Consultants/Other Providers [ x] YES  [ ] NO    MEDS:   MEDICATIONS  (STANDING):  chlorhexidine 2% Cloths 1 Application(s) Topical daily  clopidogrel Tablet 75 milliGRAM(s) Oral daily  dextrose 5%. 1000 milliLiter(s) (50 mL/Hr) IV Continuous <Continuous>  dextrose 5%. 1000 milliLiter(s) (100 mL/Hr) IV Continuous <Continuous>  dextrose 50% Injectable 25 Gram(s) IV Push once  dextrose 50% Injectable 25 Gram(s) IV Push once  dextrose 50% Injectable 12.5 Gram(s) IV Push once  divalproex  milliGRAM(s) Oral two times a day  finasteride 5 milliGRAM(s) Oral daily  glucagon  Injectable 1 milliGRAM(s) IntraMuscular once  haloperidol     Tablet 2 milliGRAM(s) Oral at bedtime  hydrALAZINE 25 milliGRAM(s) Oral every 8 hours  insulin glargine Injectable (LANTUS) 8 Unit(s) SubCutaneous at bedtime  insulin lispro (ADMELOG) corrective regimen sliding scale   SubCutaneous three times a day before meals  isosorbide   mononitrate ER Tablet (IMDUR) 30 milliGRAM(s) Oral daily  metoprolol tartrate 100 milliGRAM(s) Oral daily  sodium bicarbonate 650 milliGRAM(s) Oral three times a day  tamsulosin 0.4 milliGRAM(s) Oral at bedtime  traZODone 100 milliGRAM(s) Oral at bedtime    MEDICATIONS  (PRN):  artificial tears (preservative free) Ophthalmic Solution 1 Drop(s) Both EYES four times a day PRN Dry Eyes  dextrose Oral Gel 15 Gram(s) Oral once PRN Blood Glucose LESS THAN 70 milliGRAM(s)/deciliter        ALLERGIES:  Allergy Status Unknown      LABS:                                 9.7    4.37  )-----------( 137      ( 26 Dec 2023 09:51 )             31.0   12-26    135  |  107  |  100<H>  ----------------------------<  93  5.3   |  15<L>  |  5.04<H>    Ca    8.8      26 Dec 2023 09:51          TSH: Thyroid Stimulating Hormone, Serum: 2.59 uIU/mL (12-19 @ 15:21)     < from: US Kidney and Bladder (12.19.23 @ 23:39) >  IMPRESSION:  1.  No hydronephrosis.  2.  Increased echogenicity compatible with medical renal disease.  3.  Layering debris within the urinary bladder. Correlate with urinalysis.    --- End of Report ---        < end of copied text >

## 2023-12-27 NOTE — PROGRESS NOTE ADULT - ASSESSMENT
78 male h/o schizophrenia, suspected ESRD on HD, sent in for agitation and combativeness    schizophrenia  psy eval noted  resume home meds  psy f/u for agitation    suspected esrd   renal consult f/u  pt has reportedly been refusing HD as per records, but in discussion with nursing home supervisor pt is not on dalysis. only has ckd  unable to verify history as pt confused, and no family available  pt likely has ckd  no need for urgent hd  cont to monitor     htn  resume home meds    anemia  likely chronic  monitor      pt medically stable/cleared for dc         Advanced care planning was discussed with patient and family.  Advanced care planning forms were reviewed and discussed as appropriate.  Differential diagnosis and plan of care discussed with patient after the evaluation.   Pain assessed and judicious use of narcotics when appropriate was discussed.  Importance of Fall prevention discussed.  Counseling on Smoking and Alcohol cessation was offered when appropriate.  Counseling on Diet, exercise, and medication compliance was done.       Approx 75 minutes spent.
78 male h/o schizophrenia, suspected ESRD on HD, sent in for agitation and combativeness    schizophrenia  psy audra noted  resume home meds    suspected esrd   renal consult f/u  pt has reportedly been refusing HD as per records, but in discussion with nursing home supervisor pt is not on dalysis. only has ckd  unable to verify history as pt confused, and no family available  pt likely has ckd  no need for urgent hd  cont to monitor     htn  resume home meds    anemia  likely chronic  monitor          Advanced care planning was discussed with patient and family.  Advanced care planning forms were reviewed and discussed as appropriate.  Differential diagnosis and plan of care discussed with patient after the evaluation.   Pain assessed and judicious use of narcotics when appropriate was discussed.  Importance of Fall prevention discussed.  Counseling on Smoking and Alcohol cessation was offered when appropriate.  Counseling on Diet, exercise, and medication compliance was done.       Approx 75 minutes spent.
78 male h/o schizophrenia, suspected ESRD on HD, sent in for agitation and combativeness    schizophrenia  psy eval noted  resume home meds  psy f/u for agitation    suspected esrd   renal consult f/u  pt has reportedly been refusing HD as per records, but in discussion with nursing home supervisor pt is not on dalysis. only has ckd  unable to verify history as pt confused, and no family available  pt likely has ckd  no need for urgent hd  cont to monitor     htn  resume home meds    anemia  likely chronic  monitor      pt medically stable/cleared for dc         Advanced care planning was discussed with patient and family.  Advanced care planning forms were reviewed and discussed as appropriate.  Differential diagnosis and plan of care discussed with patient after the evaluation.   Pain assessed and judicious use of narcotics when appropriate was discussed.  Importance of Fall prevention discussed.  Counseling on Smoking and Alcohol cessation was offered when appropriate.  Counseling on Diet, exercise, and medication compliance was done.       Approx 75 minutes spent.
78 male h/o schizophrenia, suspected ESRD on HD, sent in for agitation and combativeness    schizophrenia  psy eval noted  resume home meds  psy f/u for agitation    suspected esrd   renal consult f/u  pt has reportedly been refusing HD as per records, but in discussion with nursing home supervisor pt is not on dalysis. only has ckd  unable to verify history as pt confused, and no family available  pt likely has ckd  no need for urgent hd  cont to monitor     htn  resume home meds    anemia  likely chronic  monitor      pt medically stable/cleared for dc         Advanced care planning was discussed with patient and family.  Advanced care planning forms were reviewed and discussed as appropriate.  Differential diagnosis and plan of care discussed with patient after the evaluation.   Pain assessed and judicious use of narcotics when appropriate was discussed.  Importance of Fall prevention discussed.  Counseling on Smoking and Alcohol cessation was offered when appropriate.  Counseling on Diet, exercise, and medication compliance was done.       Approx 75 minutes spent.
78 male h/o schizophrenia, suspected ESRD on HD, sent in for agitation and combativeness    schizophrenia  psy audra noted  resume home meds    suspected esrd   renal consult f/u  pt has reportedly been refusing HD as per records, but in discussion with nursing home supervisor pt is not on dalysis. only has ckd  unable to verify history as pt confused, and no family available  pt likely has ckd  no need for urgent hd  cont to monitor     htn  resume home meds    anemia  likely chronic  monitor      pt medically stable/cleared for dc         Advanced care planning was discussed with patient and family.  Advanced care planning forms were reviewed and discussed as appropriate.  Differential diagnosis and plan of care discussed with patient after the evaluation.   Pain assessed and judicious use of narcotics when appropriate was discussed.  Importance of Fall prevention discussed.  Counseling on Smoking and Alcohol cessation was offered when appropriate.  Counseling on Diet, exercise, and medication compliance was done.       Approx 75 minutes spent.
78 male h/o schizophrenia, suspected ESRD on HD, sent in for agitation and combativeness    schizophrenia  psy eval noted  resume home meds  psy f/u for agitation    suspected esrd   renal consult f/u  pt has reportedly been refusing HD as per records, but in discussion with nursing home supervisor pt is not on dalysis. only has ckd  unable to verify history as pt confused, and no family available  pt  has ckd  no need for urgent hd  po bicarb as ordered  cont to monitor   outpt f/u    htn  resume home meds    anemia  likely chronic  monitor      pt medically stable/cleared for dc         Advanced care planning was discussed with patient and family.  Advanced care planning forms were reviewed and discussed as appropriate.  Differential diagnosis and plan of care discussed with patient after the evaluation.   Pain assessed and judicious use of narcotics when appropriate was discussed.  Importance of Fall prevention discussed.  Counseling on Smoking and Alcohol cessation was offered when appropriate.  Counseling on Diet, exercise, and medication compliance was done.       Approx 75 minutes spent.
Pt with history of advanced CKD
Pt with HAYLEY.
Pt with history of advanced CKD
Pt with HAYLEY.

## 2023-12-27 NOTE — CHART NOTE - NSCHARTNOTESELECT_GEN_ALL_CORE
Agitation/Event Note
Social Work ED Note/Event Note
[place]-Agitation/Event Note
Nephrology/Event Note

## 2023-12-27 NOTE — PROGRESS NOTE ADULT - PROBLEM SELECTOR PLAN 2
BP is improved overall continue medications and continue to monitor.
BP is improved but still elevated can consider addition of ccb.  monitor BP.
BP is improved overall continue medications and continue to monitor.
BP is improved overall continue medications and continue to monitor.

## 2023-12-27 NOTE — PROGRESS NOTE ADULT - PROBLEM SELECTOR PLAN 3
hemoglobin low check iron studies.  monitor CBC.

## 2023-12-27 NOTE — PROGRESS NOTE ADULT - PROBLEM SELECTOR PLAN 1
Pt with kidney failure in the setting of uncontrolled hypertension duration and etiology unknown at this time. [facility] [place] labs available. Admission Scr was 4.46 with eGFR of 13 on 12/19/23. UA unremarkable and ultrasound with no obstruction. Labs done yesterday are stable.  At present there is no emergency indication for dialysis.  Primary team has been in touch with facility and patient corroborated that he is being "worked up" for dialysis but has not started.  I called and spoke to sister Eli Okeefe today and explained that dialysis is not emergent but he would need access like AVF which can be done an outpatient.  The question is if the nursing home is able to coordinate transportation to and from vascular surgery as well as to arrange outpatient dialysis planning. I have left a message with nursing home staff to call me back to discuss. Pt with kidney failure in the setting of uncontrolled hypertension duration and etiology unknown at this time. [facility] [place] available. Admission Scr was 4.46 with eGFR of 13 on 12/19/23. UA unremarkable and ultrasound with no obstruction. Labs done yesterday are stable.  At present there is no emergency indication for dialysis.  Primary team has been in touch with facility and patient corroborated that he is being "worked up" for dialysis but has not started.  I called and spoke to sister Eli Okeefe today and explained that dialysis is not emergent but he would need access like AVF which can be done an outpatient.  The question is if the nursing home is able to coordinate transportation to and from vascular surgery as well as to arrange outpatient dialysis planning. I have left a message with nursing home staff to call me back to discuss.

## 2023-12-27 NOTE — BH CONSULTATION LIAISON PROGRESS NOTE - NSBHCONSULTFOLLOWAFTERCARE_PSY_A_CORE FT
f/u with outpt psychiarist Dr. Toledo at [facility]  f/u with outpt psychiarist Dr. Toledo at [facility]

## 2023-12-27 NOTE — PROGRESS NOTE ADULT - SUBJECTIVE AND OBJECTIVE BOX
[facility] Division of Kidney Diseases & Hypertension  FOLLOW UP NOTE  --------------------------------------------------------------------------------  Chief Complaint: altered mentation agitation    24 hour events/subjective: Patient was seen and evaluated at bedside this morning.  No complaints otherwise still wants to go home.  Patient more cooperative with history and physical examination today.  Provided additional history that he is a [place]  and usually gets his care at the [facility].  Patient states that he was told that he will need dialysis and wants to get it over with already.      PAST HISTORY  --------------------------------------------------------------------------------  No significant changes to PMH, PSH, FHx, SHx, unless otherwise noted    ALLERGIES & MEDICATIONS  --------------------------------------------------------------------------------  Allergies    Allergy Status Unknown    Intolerances      Standing Inpatient Medications  chlorhexidine 2% Cloths 1 Application(s) Topical daily  clopidogrel Tablet 75 milliGRAM(s) Oral daily  dextrose 5%. 1000 milliLiter(s) IV Continuous <Continuous>  dextrose 5%. 1000 milliLiter(s) IV Continuous <Continuous>  dextrose 50% Injectable 25 Gram(s) IV Push once  dextrose 50% Injectable 12.5 Gram(s) IV Push once  dextrose 50% Injectable 25 Gram(s) IV Push once  divalproex  milliGRAM(s) Oral two times a day  finasteride 5 milliGRAM(s) Oral daily  glucagon  Injectable 1 milliGRAM(s) IntraMuscular once  haloperidol     Tablet 2 milliGRAM(s) Oral at bedtime  hydrALAZINE 25 milliGRAM(s) Oral every 8 hours  insulin glargine Injectable (LANTUS) 8 Unit(s) SubCutaneous at bedtime  insulin lispro (ADMELOG) corrective regimen sliding scale   SubCutaneous three times a day before meals  isosorbide   mononitrate ER Tablet (IMDUR) 30 milliGRAM(s) Oral daily  metoprolol tartrate 100 milliGRAM(s) Oral daily  sodium bicarbonate 650 milliGRAM(s) Oral three times a day  tamsulosin 0.4 milliGRAM(s) Oral at bedtime  traZODone 100 milliGRAM(s) Oral at bedtime    PRN Inpatient Medications  artificial tears (preservative free) Ophthalmic Solution 1 Drop(s) Both EYES four times a day PRN  dextrose Oral Gel 15 Gram(s) Oral once PRN      REVIEW OF SYSTEMS  --------------------------------------------------------------------------------  Gen: no fever  Respiratory: no sob  CV: no cp  GI: no ab pain  : urinates "a lot"  MSK: no pain    VITALS/PHYSICAL EXAM  --------------------------------------------------------------------------------  T(C): 36.5 (12-27-23 @ 05:21), Max: 36.8 (12-26-23 @ 14:36)  HR: 76 (12-27-23 @ 05:21) (66 - 76)  BP: 153/64 (12-27-23 @ 05:21) (122/68 - 153/64)  ABP: --  ABP(mean): --  RR: 18 (12-27-23 @ 05:21) (18 - 18)  SpO2: 100% (12-27-23 @ 05:21) (100% - 100%)  CVP(mm Hg): --        12-26-23 @ 07:01  -  12-27-23 @ 06:59  --------------------------------------------------------  IN: 960 mL / OUT: 1300 mL / NET: -340 mL      Physical Exam:  	Gen: no distress  	HEENT: anicteric  	Pulm: CTA B/L  	CV: RRR, S1S2; no rub  	Abd: soft  	: no fullness  	LE: no edema  	Neuro: awake and alert  	Psych: Normal affect and mood  	Skin: no dialysis access noted  	Vascular access: none      LABS/STUDIES  --------------------------------------------------------------------------------              9.7    4.37  >-----------<  137      [12-26-23 @ 09:51]              31.0     135  |  107  |  100  ----------------------------<  93      [12-26-23 @ 09:51]  5.3   |  15  |  5.04        Ca     8.8     [12-26-23 @ 09:51]            Creatinine Trend:  SCr 5.04 [12-26 @ 09:51]  SCr 4.46 [12-19 @ 15:21]    Urinalysis - [12-26-23 @ 09:51]      Color  / Appearance  / SG  / pH       Gluc 93 / Ketone   / Bili  / Urobili        Blood  / Protein  / Leuk Est  / Nitrite       RBC  / WBC  / Hyaline  / Gran  / Sq Epi  / Non Sq Epi  / Bacteria            [facility] Division of Kidney Diseases & Hypertension  FOLLOW UP NOTE  --------------------------------------------------------------------------------  Chief Complaint: altered mentation agitation    24 hour events/subjective: Patient was seen and evaluated at bedside this morning.  No complaints otherwise still wants to go home.  Patient more cooperative with history and physical examination today.  Provided additional history that he is a [place]  and usually gets his care at the [facility].  Patient states that he was told that he will need dialysis and wants to get it over with already.      PAST HISTORY  --------------------------------------------------------------------------------  No significant changes to PMH, PSH, FHx, SHx, unless otherwise noted    ALLERGIES & MEDICATIONS  --------------------------------------------------------------------------------  Allergies    Allergy Status Unknown    Intolerances      Standing Inpatient Medications  chlorhexidine 2% Cloths 1 Application(s) Topical daily  clopidogrel Tablet 75 milliGRAM(s) Oral daily  dextrose 5%. 1000 milliLiter(s) IV Continuous <Continuous>  dextrose 5%. 1000 milliLiter(s) IV Continuous <Continuous>  dextrose 50% Injectable 25 Gram(s) IV Push once  dextrose 50% Injectable 12.5 Gram(s) IV Push once  dextrose 50% Injectable 25 Gram(s) IV Push once  divalproex  milliGRAM(s) Oral two times a day  finasteride 5 milliGRAM(s) Oral daily  glucagon  Injectable 1 milliGRAM(s) IntraMuscular once  haloperidol     Tablet 2 milliGRAM(s) Oral at bedtime  hydrALAZINE 25 milliGRAM(s) Oral every 8 hours  insulin glargine Injectable (LANTUS) 8 Unit(s) SubCutaneous at bedtime  insulin lispro (ADMELOG) corrective regimen sliding scale   SubCutaneous three times a day before meals  isosorbide   mononitrate ER Tablet (IMDUR) 30 milliGRAM(s) Oral daily  metoprolol tartrate 100 milliGRAM(s) Oral daily  sodium bicarbonate 650 milliGRAM(s) Oral three times a day  tamsulosin 0.4 milliGRAM(s) Oral at bedtime  traZODone 100 milliGRAM(s) Oral at bedtime    PRN Inpatient Medications  artificial tears (preservative free) Ophthalmic Solution 1 Drop(s) Both EYES four times a day PRN  dextrose Oral Gel 15 Gram(s) Oral once PRN      REVIEW OF SYSTEMS  --------------------------------------------------------------------------------  Gen: no fever  Respiratory: no sob  CV: no cp  GI: no ab pain  : urinates "a lot"  MSK: no pain    VITALS/PHYSICAL EXAM  --------------------------------------------------------------------------------  T(C): 36.5 (12-27-23 @ 05:21), Max: 36.8 (12-26-23 @ 14:36)  HR: 76 (12-27-23 @ 05:21) (66 - 76)  BP: 153/64 (12-27-23 @ 05:21) (122/68 - 153/64)  ABP: --  ABP(mean): --  RR: 18 (12-27-23 @ 05:21) (18 - 18)  SpO2: 100% (12-27-23 @ 05:21) (100% - 100%)  CVP(mm Hg): --        12-26-23 @ 07:01  -  12-27-23 @ 06:59  --------------------------------------------------------  IN: 960 mL / OUT: 1300 mL / NET: -340 mL      Physical Exam:  	Gen: no distress  	HEENT: anicteric  	Pulm: CTA B/L  	CV: RRR, S1S2; no rub  	Abd: soft  	: no fullness  	LE: no edema  	Neuro: awake and alert  	Psych: Normal affect and mood  	Skin: no dialysis access noted  	Vascular access: none      LABS/STUDIES  --------------------------------------------------------------------------------              9.7    4.37  >-----------<  137      [12-26-23 @ 09:51]              31.0     135  |  107  |  100  ----------------------------<  93      [12-26-23 @ 09:51]  5.3   |  15  |  5.04        Ca     8.8     [12-26-23 @ 09:51]            Creatinine Trend:  SCr 5.04 [12-26 @ 09:51]  SCr 4.46 [12-19 @ 15:21]    Urinalysis - [12-26-23 @ 09:51]      Color  / Appearance  / SG  / pH       Gluc 93 / Ketone   / Bili  / Urobili        Blood  / Protein  / Leuk Est  / Nitrite       RBC  / WBC  / Hyaline  / Gran  / Sq Epi  / Non Sq Epi  / Bacteria

## 2023-12-27 NOTE — DISCHARGE NOTE NURSING/CASE MANAGEMENT/SOCIAL WORK - PATIENT PORTAL LINK FT
You can access the FollowMyHealth Patient Portal offered by [facility] by registering at the following website: [email or web address]. By joining [employer]’s FollowMyHealth portal, you will also be able to view your health information using other applications (apps) compatible with our system. You can access the FollowMyHealth Patient Portal offered by [facility] by registering at the following website: [email or web address]. By joining [email or web address] FollowMyHealth portal, you will also be able to view your health information using other applications (apps) compatible with our system.
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
## 2023-12-28 VITALS
RESPIRATION RATE: 16 BRPM | HEART RATE: 75 BPM | OXYGEN SATURATION: 98 % | SYSTOLIC BLOOD PRESSURE: 147 MMHG | DIASTOLIC BLOOD PRESSURE: 63 MMHG | TEMPERATURE: 98 F

## 2023-12-28 LAB
GLUCOSE BLDC GLUCOMTR-MCNC: 164 MG/DL — HIGH (ref 70–99)
GLUCOSE BLDC GLUCOMTR-MCNC: 164 MG/DL — HIGH (ref 70–99)
GLUCOSE BLDC GLUCOMTR-MCNC: 88 MG/DL — SIGNIFICANT CHANGE UP (ref 70–99)
GLUCOSE BLDC GLUCOMTR-MCNC: 88 MG/DL — SIGNIFICANT CHANGE UP (ref 70–99)

## 2023-12-28 PROCEDURE — 80164 ASSAY DIPROPYLACETIC ACD TOT: CPT

## 2023-12-28 PROCEDURE — 85027 COMPLETE CBC AUTOMATED: CPT

## 2023-12-28 PROCEDURE — 93005 ELECTROCARDIOGRAM TRACING: CPT

## 2023-12-28 PROCEDURE — 82962 GLUCOSE BLOOD TEST: CPT

## 2023-12-28 PROCEDURE — 99232 SBSQ HOSP IP/OBS MODERATE 35: CPT

## 2023-12-28 PROCEDURE — 76770 US EXAM ABDO BACK WALL COMP: CPT

## 2023-12-28 PROCEDURE — 81001 URINALYSIS AUTO W/SCOPE: CPT

## 2023-12-28 PROCEDURE — 84443 ASSAY THYROID STIM HORMONE: CPT

## 2023-12-28 PROCEDURE — 80048 BASIC METABOLIC PNL TOTAL CA: CPT

## 2023-12-28 PROCEDURE — 80307 DRUG TEST PRSMV CHEM ANLYZR: CPT

## 2023-12-28 PROCEDURE — 80053 COMPREHEN METABOLIC PANEL: CPT

## 2023-12-28 PROCEDURE — 85025 COMPLETE CBC W/AUTO DIFF WBC: CPT

## 2023-12-28 PROCEDURE — 99285 EMERGENCY DEPT VISIT HI MDM: CPT

## 2023-12-28 RX ADMIN — CLOPIDOGREL BISULFATE 75 MILLIGRAM(S): 75 TABLET, FILM COATED ORAL at 12:31

## 2023-12-28 RX ADMIN — FINASTERIDE 5 MILLIGRAM(S): 5 TABLET, FILM COATED ORAL at 12:31

## 2023-12-28 RX ADMIN — Medication 25 MILLIGRAM(S): at 06:13

## 2023-12-28 RX ADMIN — Medication 100 MILLIGRAM(S): at 06:13

## 2023-12-28 RX ADMIN — CHLORHEXIDINE GLUCONATE 1 APPLICATION(S): 213 SOLUTION TOPICAL at 12:34

## 2023-12-28 RX ADMIN — Medication 1: at 12:33

## 2023-12-28 RX ADMIN — Medication 650 MILLIGRAM(S): at 06:13

## 2023-12-28 RX ADMIN — DIVALPROEX SODIUM 500 MILLIGRAM(S): 500 TABLET, DELAYED RELEASE ORAL at 06:13

## 2023-12-28 RX ADMIN — ISOSORBIDE MONONITRATE 30 MILLIGRAM(S): 60 TABLET, EXTENDED RELEASE ORAL at 12:32

## 2023-12-28 NOTE — BH CONSULTATION LIAISON PROGRESS NOTE - NSBHFUPINTERVALHXFT_PSY_A_CORE
Pt seen and evaluated, covering for Dr. Nicole, chart, labs, meds reviewed. As previously noted, patient was initially cleared from Psych, but was agitated two nights in a row, received haldol PRN. Pt. on follow up assessment considerably less irritable, intermittently speaks loudly, no longer yelling at baseline, no physical aggression towards staff. Pt currently resting, stating he would like to leave soon. No SI/HI. He has not represented a management problem overnight.    As per staff, intermittently refusing meds. NH requested that the patient be placed on a DOLAN. This option was discussed with the patient; he emphatically refuses. 
Pt seen and evaluated, covering for Dr. Nicole, chart, labs, meds reviewed. He was initially cleared from Psych, but was agitated overnight, two nights in a row, received haldol PRN. Pt. intermittently continues to speak loudly, yelling at baseline, but no physical aggression towards staff. Pt currently resting, not answering most questions, responds to name. 
Pt seen and evaluated, covering for Dr. Nicole, chart, labs, meds reviewed. As previously noted, patient was initially cleared from Psych, but was agitated two nights in a row, received haldol PRN. This repeated after the last follow up as well. Pt. on follow up assessment less irritable, but still intermittently speaks loudly, yelling at baseline, but no physical aggression towards staff. Pt currently resting, stating he would like to leave soon. No SI/HI.    As per staff, intermittently refusing meds. NH requested that the patient be placed on a DOLAN. This option was discussed with the patient; he emphatically refuses. 
Pt seen in Ed yesterday, was cleared from psych. Pt agitated last night, received haldol prn. Pt continues to speak loudly, yelling at baseline. no physical aggression towards staff. Pt currently resting, sleeping sedated, not answreing most questions, responds to name. 
Pt seen and evaluated, covering for Dr. Nicole, chart, labs, meds reviewed. As previously noted, patient was initially cleared from Psych, but was agitated two nights in a row, received haldol PRN. This repeated after the last follow up as well. Pt. less irritable, but still intermittently speaks loudly, yelling at baseline, but no physical aggression towards staff. Pt currently resting, stating he would like to leave soon. No SI/HI.    As per staff, intermittently refusing meds.

## 2023-12-28 NOTE — BH CONSULTATION LIAISON PROGRESS NOTE - CURRENT MEDICATION
MEDICATIONS  (STANDING):  chlorhexidine 2% Cloths 1 Application(s) Topical daily  clopidogrel Tablet 75 milliGRAM(s) Oral daily  dextrose 5%. 1000 milliLiter(s) (50 mL/Hr) IV Continuous <Continuous>  dextrose 5%. 1000 milliLiter(s) (100 mL/Hr) IV Continuous <Continuous>  dextrose 50% Injectable 12.5 Gram(s) IV Push once  dextrose 50% Injectable 25 Gram(s) IV Push once  dextrose 50% Injectable 25 Gram(s) IV Push once  divalproex  milliGRAM(s) Oral two times a day  finasteride 5 milliGRAM(s) Oral daily  glucagon  Injectable 1 milliGRAM(s) IntraMuscular once  haloperidol     Tablet 2 milliGRAM(s) Oral at bedtime  hydrALAZINE 25 milliGRAM(s) Oral every 8 hours  insulin glargine Injectable (LANTUS) 8 Unit(s) SubCutaneous at bedtime  insulin lispro (ADMELOG) corrective regimen sliding scale   SubCutaneous three times a day before meals  isosorbide   mononitrate ER Tablet (IMDUR) 30 milliGRAM(s) Oral daily  metoprolol tartrate 100 milliGRAM(s) Oral daily  sodium bicarbonate 650 milliGRAM(s) Oral three times a day  tamsulosin 0.4 milliGRAM(s) Oral at bedtime  traZODone 100 milliGRAM(s) Oral at bedtime    MEDICATIONS  (PRN):  artificial tears (preservative free) Ophthalmic Solution 1 Drop(s) Both EYES four times a day PRN Dry Eyes  dextrose Oral Gel 15 Gram(s) Oral once PRN Blood Glucose LESS THAN 70 milliGRAM(s)/deciliter  
MEDICATIONS  (STANDING):  chlorhexidine 2% Cloths 1 Application(s) Topical daily  clopidogrel Tablet 75 milliGRAM(s) Oral daily  dextrose 5%. 1000 milliLiter(s) (100 mL/Hr) IV Continuous <Continuous>  dextrose 5%. 1000 milliLiter(s) (50 mL/Hr) IV Continuous <Continuous>  dextrose 50% Injectable 25 Gram(s) IV Push once  dextrose 50% Injectable 25 Gram(s) IV Push once  dextrose 50% Injectable 12.5 Gram(s) IV Push once  divalproex  milliGRAM(s) Oral two times a day  finasteride 5 milliGRAM(s) Oral daily  glucagon  Injectable 1 milliGRAM(s) IntraMuscular once  hydrALAZINE 25 milliGRAM(s) Oral every 8 hours  insulin glargine Injectable (LANTUS) 8 Unit(s) SubCutaneous at bedtime  insulin lispro (ADMELOG) corrective regimen sliding scale   SubCutaneous three times a day before meals  isosorbide   mononitrate ER Tablet (IMDUR) 30 milliGRAM(s) Oral daily  metoprolol tartrate 100 milliGRAM(s) Oral daily  tamsulosin 0.4 milliGRAM(s) Oral at bedtime  traZODone 100 milliGRAM(s) Oral at bedtime    MEDICATIONS  (PRN):  dextrose Oral Gel 15 Gram(s) Oral once PRN Blood Glucose LESS THAN 70 milliGRAM(s)/deciliter  
MEDICATIONS  (STANDING):  chlorhexidine 2% Cloths 1 Application(s) Topical daily  clopidogrel Tablet 75 milliGRAM(s) Oral daily  dextrose 5%. 1000 milliLiter(s) (100 mL/Hr) IV Continuous <Continuous>  dextrose 5%. 1000 milliLiter(s) (50 mL/Hr) IV Continuous <Continuous>  dextrose 50% Injectable 25 Gram(s) IV Push once  dextrose 50% Injectable 12.5 Gram(s) IV Push once  dextrose 50% Injectable 25 Gram(s) IV Push once  divalproex  milliGRAM(s) Oral two times a day  finasteride 5 milliGRAM(s) Oral daily  glucagon  Injectable 1 milliGRAM(s) IntraMuscular once  haloperidol     Tablet 2 milliGRAM(s) Oral at bedtime  hydrALAZINE 25 milliGRAM(s) Oral every 8 hours  insulin glargine Injectable (LANTUS) 8 Unit(s) SubCutaneous at bedtime  insulin lispro (ADMELOG) corrective regimen sliding scale   SubCutaneous three times a day before meals  isosorbide   mononitrate ER Tablet (IMDUR) 30 milliGRAM(s) Oral daily  metoprolol tartrate 100 milliGRAM(s) Oral daily  sodium bicarbonate 650 milliGRAM(s) Oral three times a day  tamsulosin 0.4 milliGRAM(s) Oral at bedtime  traZODone 100 milliGRAM(s) Oral at bedtime    MEDICATIONS  (PRN):  artificial tears (preservative free) Ophthalmic Solution 1 Drop(s) Both EYES four times a day PRN Dry Eyes  dextrose Oral Gel 15 Gram(s) Oral once PRN Blood Glucose LESS THAN 70 milliGRAM(s)/deciliter  
MEDICATIONS  (STANDING):  chlorhexidine 2% Cloths 1 Application(s) Topical daily  clopidogrel Tablet 75 milliGRAM(s) Oral daily  dextrose 5%. 1000 milliLiter(s) (50 mL/Hr) IV Continuous <Continuous>  dextrose 5%. 1000 milliLiter(s) (100 mL/Hr) IV Continuous <Continuous>  dextrose 50% Injectable 25 Gram(s) IV Push once  dextrose 50% Injectable 25 Gram(s) IV Push once  dextrose 50% Injectable 12.5 Gram(s) IV Push once  divalproex  milliGRAM(s) Oral two times a day  finasteride 5 milliGRAM(s) Oral daily  glucagon  Injectable 1 milliGRAM(s) IntraMuscular once  haloperidol     Tablet 2 milliGRAM(s) Oral at bedtime  hydrALAZINE 25 milliGRAM(s) Oral every 8 hours  insulin glargine Injectable (LANTUS) 8 Unit(s) SubCutaneous at bedtime  insulin lispro (ADMELOG) corrective regimen sliding scale   SubCutaneous three times a day before meals  isosorbide   mononitrate ER Tablet (IMDUR) 30 milliGRAM(s) Oral daily  metoprolol tartrate 100 milliGRAM(s) Oral daily  sodium bicarbonate 650 milliGRAM(s) Oral three times a day  tamsulosin 0.4 milliGRAM(s) Oral at bedtime  traZODone 100 milliGRAM(s) Oral at bedtime    MEDICATIONS  (PRN):  artificial tears (preservative free) Ophthalmic Solution 1 Drop(s) Both EYES four times a day PRN Dry Eyes  dextrose Oral Gel 15 Gram(s) Oral once PRN Blood Glucose LESS THAN 70 milliGRAM(s)/deciliter  
MEDICATIONS  (STANDING):  chlorhexidine 2% Cloths 1 Application(s) Topical daily  clopidogrel Tablet 75 milliGRAM(s) Oral daily  dextrose 5%. 1000 milliLiter(s) (50 mL/Hr) IV Continuous <Continuous>  dextrose 5%. 1000 milliLiter(s) (100 mL/Hr) IV Continuous <Continuous>  dextrose 50% Injectable 25 Gram(s) IV Push once  dextrose 50% Injectable 25 Gram(s) IV Push once  dextrose 50% Injectable 12.5 Gram(s) IV Push once  divalproex  milliGRAM(s) Oral two times a day  finasteride 5 milliGRAM(s) Oral daily  glucagon  Injectable 1 milliGRAM(s) IntraMuscular once  hydrALAZINE 25 milliGRAM(s) Oral every 8 hours  insulin glargine Injectable (LANTUS) 8 Unit(s) SubCutaneous at bedtime  insulin lispro (ADMELOG) corrective regimen sliding scale   SubCutaneous three times a day before meals  isosorbide   mononitrate ER Tablet (IMDUR) 30 milliGRAM(s) Oral daily  metoprolol tartrate 100 milliGRAM(s) Oral daily  tamsulosin 0.4 milliGRAM(s) Oral at bedtime  traZODone 100 milliGRAM(s) Oral at bedtime    MEDICATIONS  (PRN):  dextrose Oral Gel 15 Gram(s) Oral once PRN Blood Glucose LESS THAN 70 milliGRAM(s)/deciliter

## 2023-12-28 NOTE — BH CONSULTATION LIAISON PROGRESS NOTE - NSBHCHARTREVIEWVS_PSY_A_CORE FT
Vital Signs Last 24 Hrs  T(C): 36.8 (28 Dec 2023 07:50), Max: 36.8 (28 Dec 2023 07:50)  T(F): 98.2 (28 Dec 2023 07:50), Max: 98.2 (28 Dec 2023 07:50)  HR: 75 (28 Dec 2023 07:50) (73 - 75)  BP: 147/63 (28 Dec 2023 07:50) (140/86 - 147/63)  BP(mean): --  RR: 16 (28 Dec 2023 07:50) (16 - 16)  SpO2: 98% (28 Dec 2023 07:50) (98% - 98%)    Parameters below as of 28 Dec 2023 07:50  Patient On (Oxygen Delivery Method): room air    
Vital Signs Last 24 Hrs  T(C): 36.5 (27 Dec 2023 05:21), Max: 36.6 (26 Dec 2023 19:05)  T(F): 97.7 (27 Dec 2023 05:21), Max: 97.9 (26 Dec 2023 19:05)  HR: 76 (27 Dec 2023 05:21) (72 - 76)  BP: 153/64 (27 Dec 2023 05:21) (148/70 - 153/64)  BP(mean): --  RR: 18 (27 Dec 2023 05:21) (18 - 18)  SpO2: 100% (27 Dec 2023 05:21) (100% - 100%)    Parameters below as of 27 Dec 2023 05:21  Patient On (Oxygen Delivery Method): room air    
Vital Signs Last 24 Hrs  T(C): 36.3 (20 Dec 2023 08:58), Max: 36.8 (19 Dec 2023 20:45)  T(F): 97.3 (20 Dec 2023 08:58), Max: 98.2 (19 Dec 2023 20:45)  HR: 65 (20 Dec 2023 11:49) (57 - 96)  BP: 183/96 (20 Dec 2023 11:49) (160/85 - 209/90)  BP(mean): --  RR: 18 (20 Dec 2023 08:58) (16 - 18)  SpO2: 98% (20 Dec 2023 08:58) (97% - 99%)    Parameters below as of 20 Dec 2023 08:58  Patient On (Oxygen Delivery Method): room air    
Vital Signs Last 24 Hrs  T(C): 36.4 (21 Dec 2023 23:50), Max: 36.4 (21 Dec 2023 23:50)  T(F): 97.6 (21 Dec 2023 23:50), Max: 97.6 (21 Dec 2023 23:50)  HR: 78 (21 Dec 2023 23:50) (76 - 78)  BP: 158/74 (21 Dec 2023 23:50) (139/65 - 158/74)  BP(mean): --  RR: 18 (21 Dec 2023 23:50) (18 - 18)  SpO2: 100% (21 Dec 2023 23:50) (100% - 100%)    Parameters below as of 21 Dec 2023 23:50  Patient On (Oxygen Delivery Method): room air    
Vital Signs Last 24 Hrs  T(C): 36.8 (26 Dec 2023 14:36), Max: 36.8 (26 Dec 2023 14:36)  T(F): 98.2 (26 Dec 2023 14:36), Max: 98.2 (26 Dec 2023 14:36)  HR: 66 (26 Dec 2023 14:36) (66 - 75)  BP: 122/68 (26 Dec 2023 14:36) (122/68 - 159/78)  BP(mean): --  RR: 18 (26 Dec 2023 14:36) (18 - 18)  SpO2: 100% (26 Dec 2023 14:36) (100% - 100%)    Parameters below as of 26 Dec 2023 14:36  Patient On (Oxygen Delivery Method): room air

## 2023-12-28 NOTE — BH CONSULTATION LIAISON PROGRESS NOTE - NSBHMSEPERCEPT_PSY_A_CORE
Unable to assess
Transition of care performed with sharing of clinical summary
Unable to assess
Type 2 diabetes mellitus without complication, with long-term current use of insulin
Unable to assess

## 2023-12-28 NOTE — BH CONSULTATION LIAISON PROGRESS NOTE - NSBHCONSULTFOLLOWAFTERCARE_PSY_A_CORE FT
f/u with outpt psychiarist Dr. Toledo at [facility]  f/u with outpt psychiarist Dr. Toledo at [facility]

## 2023-12-28 NOTE — BH CONSULTATION LIAISON PROGRESS NOTE - NSBHFUPINTERVALCCFT_PSY_A_CORE
Get out of here... and don't come back
pt mostly nonverbal 
I am fine... I should go soon
I am fine... I should go soon
I am enjoying music... but, ready to go

## 2023-12-28 NOTE — BH CONSULTATION LIAISON PROGRESS NOTE - NSBHCHARTREVIEWLAB_PSY_A_CORE FT
9.7    4.37  )-----------( 137      ( 26 Dec 2023 09:51 )             31.0     12-26    135  |  107  |  100<H>  ----------------------------<  93  5.3   |  15<L>  |  5.04<H>    Ca    8.8      26 Dec 2023 09:51      Urinalysis Basic - ( 26 Dec 2023 09:51 )    Color: x / Appearance: x / SG: x / pH: x  Gluc: 93 mg/dL / Ketone: x  / Bili: x / Urobili: x   Blood: x / Protein: x / Nitrite: x   Leuk Esterase: x / RBC: x / WBC x   Sq Epi: x / Non Sq Epi: x / Bacteria: x    
                              9.7    4.37  )-----------( 137      ( 26 Dec 2023 09:51 )             31.0     12-26    135  |  107  |  100<H>  ----------------------------<  93  5.3   |  15<L>  |  5.04<H>    Ca    8.8      26 Dec 2023 09:51      Urinalysis Basic - ( 26 Dec 2023 09:51 )    Color: x / Appearance: x / SG: x / pH: x  Gluc: 93 mg/dL / Ketone: x  / Bili: x / Urobili: x   Blood: x / Protein: x / Nitrite: x   Leuk Esterase: x / RBC: x / WBC x   Sq Epi: x / Non Sq Epi: x / Bacteria: x    
                      9.6    4.09  )-----------( 159      ( 19 Dec 2023 15:21 )             29.4     12-19    139  |  105  |  75<H>  ----------------------------<  59<L>  4.4   |  22  |  4.46<H>    Ca    8.8      19 Dec 2023 15:21    TPro  7.2  /  Alb  4.1  /  TBili  0.2  /  DBili  x   /  AST  21  /  ALT  11  /  AlkPhos  96  12-19  
                      9.7    4.37  )-----------( 137      ( 26 Dec 2023 09:51 )             31.0     12-26    135  |  107  |  100<H>  ----------------------------<  93  5.3   |  15<L>  |  5.04<H>    Ca    8.8      26 Dec 2023 09:51      Urinalysis Basic - ( 26 Dec 2023 09:51 )    Color: x / Appearance: x / SG: x / pH: x  Gluc: 93 mg/dL / Ketone: x  / Bili: x / Urobili: x   Blood: x / Protein: x / Nitrite: x   Leuk Esterase: x / RBC: x / WBC x   Sq Epi: x / Non Sq Epi: x / Bacteria: x

## 2023-12-28 NOTE — BH CONSULTATION LIAISON PROGRESS NOTE - NSBHCHARTREVIEWINVESTIGATE_PSY_A_CORE FT
< from: 12 Lead ECG (12.22.23 @ 20:54) >    Ventricular Rate 79 BPM    Atrial Rate 79 BPM    P-R Interval 158 ms    QRS Duration 96 ms    Q-T Interval 368 ms    QTC Calculation(Bazett) 421 ms      < end of copied text >    

## 2023-12-28 NOTE — BH CONSULTATION LIAISON PROGRESS NOTE - NSBHCONSULTRECOMMENDOTHER_PSY_A_CORE FT
Depakote ER 500mg p.o. BID  trazodone 100mg p.o. at bedtime  Haldol 2mg p.o. at bedtime (if QTc on EKG <500ms)  melatonin 3mg p.o. at bedtime
cont depakote, trazodone 
Depakote ER 500mg p.o. BID  trazodone 100mg p.o. at bedtime  Haldol 2mg p.o. at bedtime   melatonin 3mg p.o. at bedtime
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
## 2023-12-28 NOTE — BH CONSULTATION LIAISON PROGRESS NOTE - NSBHADMITIPOBS_PSY_A_CORE
Enhanced supervision
Enhanced supervision
Routine observation
Enhanced supervision
Enhanced supervision

## 2024-01-03 ENCOUNTER — INPATIENT (INPATIENT)
Facility: HOSPITAL | Age: 79
LOS: 27 days | Discharge: NURSING, REHAB OR LONG-TERM CARE | DRG: 885 | End: 2024-01-31
Attending: INTERNAL MEDICINE | Admitting: INTERNAL MEDICINE
Payer: MEDICARE

## 2024-01-03 VITALS
TEMPERATURE: 98 F | OXYGEN SATURATION: 99 % | HEIGHT: 69 IN | HEART RATE: 63 BPM | SYSTOLIC BLOOD PRESSURE: 175 MMHG | DIASTOLIC BLOOD PRESSURE: 73 MMHG | WEIGHT: 179.9 LBS

## 2024-01-03 DIAGNOSIS — F20.9 SCHIZOPHRENIA, UNSPECIFIED: ICD-10-CM

## 2024-01-03 LAB
ALBUMIN SERPL ELPH-MCNC: 3.5 G/DL — SIGNIFICANT CHANGE UP (ref 3.3–5)
ALBUMIN SERPL ELPH-MCNC: 3.5 G/DL — SIGNIFICANT CHANGE UP (ref 3.3–5)
ALP SERPL-CCNC: 85 U/L — SIGNIFICANT CHANGE UP (ref 40–120)
ALP SERPL-CCNC: 85 U/L — SIGNIFICANT CHANGE UP (ref 40–120)
ALT FLD-CCNC: 8 U/L — LOW (ref 10–45)
ALT FLD-CCNC: 8 U/L — LOW (ref 10–45)
AMMONIA BLD-MCNC: 43 UMOL/L — SIGNIFICANT CHANGE UP (ref 11–55)
AMMONIA BLD-MCNC: 43 UMOL/L — SIGNIFICANT CHANGE UP (ref 11–55)
AMPHET UR-MCNC: NEGATIVE — SIGNIFICANT CHANGE UP
AMPHET UR-MCNC: NEGATIVE — SIGNIFICANT CHANGE UP
ANION GAP SERPL CALC-SCNC: 12 MMOL/L — SIGNIFICANT CHANGE UP (ref 5–17)
ANION GAP SERPL CALC-SCNC: 12 MMOL/L — SIGNIFICANT CHANGE UP (ref 5–17)
APAP SERPL-MCNC: <15 UG/ML — SIGNIFICANT CHANGE UP (ref 10–30)
APAP SERPL-MCNC: <15 UG/ML — SIGNIFICANT CHANGE UP (ref 10–30)
APPEARANCE UR: CLEAR — SIGNIFICANT CHANGE UP
APPEARANCE UR: CLEAR — SIGNIFICANT CHANGE UP
AST SERPL-CCNC: 15 U/L — SIGNIFICANT CHANGE UP (ref 10–40)
AST SERPL-CCNC: 15 U/L — SIGNIFICANT CHANGE UP (ref 10–40)
BACTERIA # UR AUTO: NEGATIVE /HPF — SIGNIFICANT CHANGE UP
BACTERIA # UR AUTO: NEGATIVE /HPF — SIGNIFICANT CHANGE UP
BARBITURATES UR SCN-MCNC: NEGATIVE — SIGNIFICANT CHANGE UP
BARBITURATES UR SCN-MCNC: NEGATIVE — SIGNIFICANT CHANGE UP
BASOPHILS # BLD AUTO: 0.01 K/UL — SIGNIFICANT CHANGE UP (ref 0–0.2)
BASOPHILS # BLD AUTO: 0.01 K/UL — SIGNIFICANT CHANGE UP (ref 0–0.2)
BASOPHILS NFR BLD AUTO: 0.3 % — SIGNIFICANT CHANGE UP (ref 0–2)
BASOPHILS NFR BLD AUTO: 0.3 % — SIGNIFICANT CHANGE UP (ref 0–2)
BENZODIAZ UR-MCNC: NEGATIVE — SIGNIFICANT CHANGE UP
BENZODIAZ UR-MCNC: NEGATIVE — SIGNIFICANT CHANGE UP
BILIRUB SERPL-MCNC: 0.2 MG/DL — SIGNIFICANT CHANGE UP (ref 0.2–1.2)
BILIRUB SERPL-MCNC: 0.2 MG/DL — SIGNIFICANT CHANGE UP (ref 0.2–1.2)
BILIRUB UR-MCNC: NEGATIVE — SIGNIFICANT CHANGE UP
BILIRUB UR-MCNC: NEGATIVE — SIGNIFICANT CHANGE UP
BUN SERPL-MCNC: 101 MG/DL — HIGH (ref 7–23)
BUN SERPL-MCNC: 101 MG/DL — HIGH (ref 7–23)
CALCIUM SERPL-MCNC: 7.9 MG/DL — LOW (ref 8.4–10.5)
CALCIUM SERPL-MCNC: 7.9 MG/DL — LOW (ref 8.4–10.5)
CAST: 0 /LPF — SIGNIFICANT CHANGE UP (ref 0–4)
CAST: 0 /LPF — SIGNIFICANT CHANGE UP (ref 0–4)
CHLORIDE SERPL-SCNC: 106 MMOL/L — SIGNIFICANT CHANGE UP (ref 96–108)
CHLORIDE SERPL-SCNC: 106 MMOL/L — SIGNIFICANT CHANGE UP (ref 96–108)
CO2 SERPL-SCNC: 16 MMOL/L — LOW (ref 22–31)
CO2 SERPL-SCNC: 16 MMOL/L — LOW (ref 22–31)
COCAINE METAB.OTHER UR-MCNC: NEGATIVE — SIGNIFICANT CHANGE UP
COCAINE METAB.OTHER UR-MCNC: NEGATIVE — SIGNIFICANT CHANGE UP
COLOR SPEC: YELLOW — SIGNIFICANT CHANGE UP
COLOR SPEC: YELLOW — SIGNIFICANT CHANGE UP
CREAT SERPL-MCNC: 5.53 MG/DL — HIGH (ref 0.5–1.3)
CREAT SERPL-MCNC: 5.53 MG/DL — HIGH (ref 0.5–1.3)
DIFF PNL FLD: ABNORMAL
DIFF PNL FLD: ABNORMAL
EGFR: 10 ML/MIN/1.73M2 — LOW
EGFR: 10 ML/MIN/1.73M2 — LOW
EOSINOPHIL # BLD AUTO: 0.07 K/UL — SIGNIFICANT CHANGE UP (ref 0–0.5)
EOSINOPHIL # BLD AUTO: 0.07 K/UL — SIGNIFICANT CHANGE UP (ref 0–0.5)
EOSINOPHIL NFR BLD AUTO: 1.9 % — SIGNIFICANT CHANGE UP (ref 0–6)
EOSINOPHIL NFR BLD AUTO: 1.9 % — SIGNIFICANT CHANGE UP (ref 0–6)
ETHANOL SERPL-MCNC: <10 MG/DL — SIGNIFICANT CHANGE UP (ref 0–10)
ETHANOL SERPL-MCNC: <10 MG/DL — SIGNIFICANT CHANGE UP (ref 0–10)
FLUAV AG NPH QL: SIGNIFICANT CHANGE UP
FLUAV AG NPH QL: SIGNIFICANT CHANGE UP
FLUBV AG NPH QL: SIGNIFICANT CHANGE UP
FLUBV AG NPH QL: SIGNIFICANT CHANGE UP
GLUCOSE SERPL-MCNC: 84 MG/DL — SIGNIFICANT CHANGE UP (ref 70–99)
GLUCOSE SERPL-MCNC: 84 MG/DL — SIGNIFICANT CHANGE UP (ref 70–99)
GLUCOSE UR QL: NEGATIVE MG/DL — SIGNIFICANT CHANGE UP
GLUCOSE UR QL: NEGATIVE MG/DL — SIGNIFICANT CHANGE UP
HCT VFR BLD CALC: 25.8 % — LOW (ref 39–50)
HCT VFR BLD CALC: 25.8 % — LOW (ref 39–50)
HGB BLD-MCNC: 8.4 G/DL — LOW (ref 13–17)
HGB BLD-MCNC: 8.4 G/DL — LOW (ref 13–17)
IMM GRANULOCYTES NFR BLD AUTO: 0.5 % — SIGNIFICANT CHANGE UP (ref 0–0.9)
IMM GRANULOCYTES NFR BLD AUTO: 0.5 % — SIGNIFICANT CHANGE UP (ref 0–0.9)
KETONES UR-MCNC: NEGATIVE MG/DL — SIGNIFICANT CHANGE UP
KETONES UR-MCNC: NEGATIVE MG/DL — SIGNIFICANT CHANGE UP
LEUKOCYTE ESTERASE UR-ACNC: NEGATIVE — SIGNIFICANT CHANGE UP
LEUKOCYTE ESTERASE UR-ACNC: NEGATIVE — SIGNIFICANT CHANGE UP
LYMPHOCYTES # BLD AUTO: 1.13 K/UL — SIGNIFICANT CHANGE UP (ref 1–3.3)
LYMPHOCYTES # BLD AUTO: 1.13 K/UL — SIGNIFICANT CHANGE UP (ref 1–3.3)
LYMPHOCYTES # BLD AUTO: 31 % — SIGNIFICANT CHANGE UP (ref 13–44)
LYMPHOCYTES # BLD AUTO: 31 % — SIGNIFICANT CHANGE UP (ref 13–44)
MCHC RBC-ENTMCNC: 29.1 PG — SIGNIFICANT CHANGE UP (ref 27–34)
MCHC RBC-ENTMCNC: 29.1 PG — SIGNIFICANT CHANGE UP (ref 27–34)
MCHC RBC-ENTMCNC: 32.6 GM/DL — SIGNIFICANT CHANGE UP (ref 32–36)
MCHC RBC-ENTMCNC: 32.6 GM/DL — SIGNIFICANT CHANGE UP (ref 32–36)
MCV RBC AUTO: 89.3 FL — SIGNIFICANT CHANGE UP (ref 80–100)
MCV RBC AUTO: 89.3 FL — SIGNIFICANT CHANGE UP (ref 80–100)
METHADONE UR-MCNC: NEGATIVE — SIGNIFICANT CHANGE UP
METHADONE UR-MCNC: NEGATIVE — SIGNIFICANT CHANGE UP
MONOCYTES # BLD AUTO: 0.37 K/UL — SIGNIFICANT CHANGE UP (ref 0–0.9)
MONOCYTES # BLD AUTO: 0.37 K/UL — SIGNIFICANT CHANGE UP (ref 0–0.9)
MONOCYTES NFR BLD AUTO: 10.2 % — SIGNIFICANT CHANGE UP (ref 2–14)
MONOCYTES NFR BLD AUTO: 10.2 % — SIGNIFICANT CHANGE UP (ref 2–14)
NEUTROPHILS # BLD AUTO: 2.04 K/UL — SIGNIFICANT CHANGE UP (ref 1.8–7.4)
NEUTROPHILS # BLD AUTO: 2.04 K/UL — SIGNIFICANT CHANGE UP (ref 1.8–7.4)
NEUTROPHILS NFR BLD AUTO: 56.1 % — SIGNIFICANT CHANGE UP (ref 43–77)
NEUTROPHILS NFR BLD AUTO: 56.1 % — SIGNIFICANT CHANGE UP (ref 43–77)
NITRITE UR-MCNC: NEGATIVE — SIGNIFICANT CHANGE UP
NITRITE UR-MCNC: NEGATIVE — SIGNIFICANT CHANGE UP
NRBC # BLD: 0 /100 WBCS — SIGNIFICANT CHANGE UP (ref 0–0)
NRBC # BLD: 0 /100 WBCS — SIGNIFICANT CHANGE UP (ref 0–0)
OPIATES UR-MCNC: NEGATIVE — SIGNIFICANT CHANGE UP
OPIATES UR-MCNC: NEGATIVE — SIGNIFICANT CHANGE UP
OXYCODONE UR-MCNC: NEGATIVE — SIGNIFICANT CHANGE UP
OXYCODONE UR-MCNC: NEGATIVE — SIGNIFICANT CHANGE UP
PCP SPEC-MCNC: SIGNIFICANT CHANGE UP
PCP SPEC-MCNC: SIGNIFICANT CHANGE UP
PCP UR-MCNC: NEGATIVE — SIGNIFICANT CHANGE UP
PCP UR-MCNC: NEGATIVE — SIGNIFICANT CHANGE UP
PH UR: 6 — SIGNIFICANT CHANGE UP (ref 5–8)
PH UR: 6 — SIGNIFICANT CHANGE UP (ref 5–8)
PLATELET # BLD AUTO: 114 K/UL — LOW (ref 150–400)
PLATELET # BLD AUTO: 114 K/UL — LOW (ref 150–400)
POTASSIUM SERPL-MCNC: 4.6 MMOL/L — SIGNIFICANT CHANGE UP (ref 3.5–5.3)
POTASSIUM SERPL-MCNC: 4.6 MMOL/L — SIGNIFICANT CHANGE UP (ref 3.5–5.3)
POTASSIUM SERPL-SCNC: 4.6 MMOL/L — SIGNIFICANT CHANGE UP (ref 3.5–5.3)
POTASSIUM SERPL-SCNC: 4.6 MMOL/L — SIGNIFICANT CHANGE UP (ref 3.5–5.3)
PROT SERPL-MCNC: 6.4 G/DL — SIGNIFICANT CHANGE UP (ref 6–8.3)
PROT SERPL-MCNC: 6.4 G/DL — SIGNIFICANT CHANGE UP (ref 6–8.3)
PROT UR-MCNC: 30 MG/DL
PROT UR-MCNC: 30 MG/DL
RBC # BLD: 2.89 M/UL — LOW (ref 4.2–5.8)
RBC # BLD: 2.89 M/UL — LOW (ref 4.2–5.8)
RBC # FLD: 14 % — SIGNIFICANT CHANGE UP (ref 10.3–14.5)
RBC # FLD: 14 % — SIGNIFICANT CHANGE UP (ref 10.3–14.5)
RBC CASTS # UR COMP ASSIST: 0 /HPF — SIGNIFICANT CHANGE UP (ref 0–4)
RBC CASTS # UR COMP ASSIST: 0 /HPF — SIGNIFICANT CHANGE UP (ref 0–4)
RSV RNA NPH QL NAA+NON-PROBE: SIGNIFICANT CHANGE UP
RSV RNA NPH QL NAA+NON-PROBE: SIGNIFICANT CHANGE UP
SALICYLATES SERPL-MCNC: <2 MG/DL — LOW (ref 15–30)
SALICYLATES SERPL-MCNC: <2 MG/DL — LOW (ref 15–30)
SARS-COV-2 RNA SPEC QL NAA+PROBE: SIGNIFICANT CHANGE UP
SARS-COV-2 RNA SPEC QL NAA+PROBE: SIGNIFICANT CHANGE UP
SODIUM SERPL-SCNC: 134 MMOL/L — LOW (ref 135–145)
SODIUM SERPL-SCNC: 134 MMOL/L — LOW (ref 135–145)
SP GR SPEC: 1.01 — SIGNIFICANT CHANGE UP (ref 1–1.03)
SP GR SPEC: 1.01 — SIGNIFICANT CHANGE UP (ref 1–1.03)
SQUAMOUS # UR AUTO: 0 /HPF — SIGNIFICANT CHANGE UP (ref 0–5)
SQUAMOUS # UR AUTO: 0 /HPF — SIGNIFICANT CHANGE UP (ref 0–5)
THC UR QL: NEGATIVE — SIGNIFICANT CHANGE UP
THC UR QL: NEGATIVE — SIGNIFICANT CHANGE UP
UROBILINOGEN FLD QL: 0.2 MG/DL — SIGNIFICANT CHANGE UP (ref 0.2–1)
UROBILINOGEN FLD QL: 0.2 MG/DL — SIGNIFICANT CHANGE UP (ref 0.2–1)
VALPROATE SERPL-MCNC: 34 UG/ML — LOW (ref 50–100)
VALPROATE SERPL-MCNC: 34 UG/ML — LOW (ref 50–100)
WBC # BLD: 3.64 K/UL — LOW (ref 3.8–10.5)
WBC # BLD: 3.64 K/UL — LOW (ref 3.8–10.5)
WBC # FLD AUTO: 3.64 K/UL — LOW (ref 3.8–10.5)
WBC # FLD AUTO: 3.64 K/UL — LOW (ref 3.8–10.5)
WBC UR QL: 0 /HPF — SIGNIFICANT CHANGE UP (ref 0–5)
WBC UR QL: 0 /HPF — SIGNIFICANT CHANGE UP (ref 0–5)

## 2024-01-03 PROCEDURE — 71045 X-RAY EXAM CHEST 1 VIEW: CPT | Mod: 26

## 2024-01-03 PROCEDURE — 99285 EMERGENCY DEPT VISIT HI MDM: CPT | Mod: FS

## 2024-01-03 RX ORDER — OLANZAPINE 15 MG/1
5 TABLET, FILM COATED ORAL ONCE
Refills: 0 | Status: COMPLETED | OUTPATIENT
Start: 2024-01-03 | End: 2024-01-03

## 2024-01-03 RX ORDER — HALOPERIDOL DECANOATE 100 MG/ML
5 INJECTION INTRAMUSCULAR ONCE
Refills: 0 | Status: COMPLETED | OUTPATIENT
Start: 2024-01-03 | End: 2024-01-03

## 2024-01-03 RX ADMIN — OLANZAPINE 5 MILLIGRAM(S): 15 TABLET, FILM COATED ORAL at 22:04

## 2024-01-03 RX ADMIN — OLANZAPINE 5 MILLIGRAM(S): 15 TABLET, FILM COATED ORAL at 14:27

## 2024-01-03 RX ADMIN — Medication 2 MILLIGRAM(S): at 14:27

## 2024-01-03 RX ADMIN — Medication 2 MILLIGRAM(S): at 13:17

## 2024-01-03 NOTE — ED BEHAVIORAL HEALTH ASSESSMENT NOTE - DESCRIPTION
Patient BIBEMS from [facility] due to  increased agitation and aggression towards others and staff at facility.  Patient given 4 mg of Ativan and 5 mg of Zyprexa in the ED   Psych was consulted Patient BIBEMS from [facility] due to  increased agitation and aggression towards others and staff at facility.  Patient given 4 mg of Ativan and 5 mg of Zyprexa in the ED   Psych was consulted Patient resides at [facility]. Unable to complete full social hx due to patient being sedated PMHx of asthma, DM, CKD, AFib, HTN, HLD, BPH, Schizoaffective, dementia Patient resides at [facility]. Unable to complete full social hx due to patient being sedated

## 2024-01-03 NOTE — ED PROVIDER NOTE - DATE/TIME
06-Jan-2024 23:27 07-Jan-2024 06:50 07-Jan-2024 09:28 07-Jan-2024 23:07 08-Jan-2024 07:32 08-Jan-2024 15:53
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
## 2024-01-03 NOTE — ED PROVIDER NOTE - ATTENDING APP SHARED VISIT CONTRIBUTION OF CARE
Dr. Nieves: I performed a face to face bedside interview with patient regarding history of present illness, review of symptoms and past medical history. I completed an independent physical exam.  I have discussed patient's plan of care with PA.   I agree with note as stated above, having amended the EMR as needed to reflect my findings.   This includes HISTORY OF PRESENT ILLNESS, HIV, PAST MEDICAL/SURGICAL/FAMILY/SOCIAL HISTORY, ALLERGIES AND HOME MEDICATIONS, REVIEW OF SYSTEMS, PHYSICAL EXAM, and any PROGRESS NOTES during the time I functioned as the attending physician for this patient.    see mdm

## 2024-01-03 NOTE — ED BEHAVIORAL HEALTH ASSESSMENT NOTE - VIOLENCE RISK FACTORS:
Detail Level: Zone
Plan: Will monitor and recheck in 6 months
Violent ideation/threat/speech/Irritability

## 2024-01-03 NOTE — ED BEHAVIORAL HEALTH ASSESSMENT NOTE - CURRENT MEDICATION
As per last visit note the patient is on trazodone 100mg qhs, depaktoe 500mg po qdaily As per last visit note the patient is on trazodone 100mg qhs, depakote 500mg po qdaily
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
## 2024-01-03 NOTE — ED BEHAVIORAL HEALTH ASSESSMENT NOTE - RISK ASSESSMENT
As per last visit, overall low risk for suicide attempt, denies si/hi, no hx attempts future oriented
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
## 2024-01-03 NOTE — ED ADULT NURSE NOTE - ED STAT RN HANDOFF WHERE
Pt needs FS pre meal, received 1U Admelog for fs 159 and 1800H. Pt ate meals, took all po meds. no aggression this shift, pt is hard of hearing, pt may need to have toileting schedule,  on CO, PT evaluated pt and was able to ambulate with some assistance and walker. Pt is 2PC waiting for a geriatric bed

## 2024-01-03 NOTE — ED BEHAVIORAL HEALTH ASSESSMENT NOTE - PSYCHIATRIC ISSUES AND PLAN (INCLUDE STANDING AND PRN MEDICATION)
obtain valproate, level, observe on 1:1, Haldol 5mg  + Ativan 2mg IM Q6H PRN for agitation, please DO NOT give IM Zyprexa with IM Benzos!
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
## 2024-01-03 NOTE — ED PROVIDER NOTE - CARE PLAN
1 Principal Discharge DX:	Agitation   Principal Discharge DX:	Schizophrenia  Secondary Diagnosis:	Stage 5 chronic kidney disease not on chronic dialysis

## 2024-01-03 NOTE — ED BEHAVIORAL HEALTH ASSESSMENT NOTE - NSBHROSSTATEMENT_PSY_A_CORE
Received fax from pharmacy requesting refill on pts medication(s). Pt was last seen in office on 10/27/2020  and has a follow up scheduled for 1/27/2021. Will send request to  [facility]  for patient.      Requested Prescriptions     Pending Prescriptions Disp Refills    levocetirizine (XYZAL) 5 MG tablet [Pharmacy Med Name: LEVOCETIRIZINE DIHYDROCHLORIDE 5 MG Tablet] 90 tablet 3     Sig: Take 1 tablet by mouth nightly    furosemide (LASIX) 40 MG tablet [Pharmacy Med Name: FUROSEMIDE 40 MG Tablet] 180 tablet 3     Sig: Take 1 tablet by mouth 2 times daily    FLUoxetine (PROZAC) 20 MG capsule [Pharmacy Med Name: FLUOXETINE HCL 20 MG Capsule] 90 capsule 3     Sig: Take 1 capsule by mouth daily    blood glucose test strips (ACCU-CHEK DALTON PLUS) strip [Pharmacy Med Name: [address] [place] [address]   Strip] 200 strip 3     Sig: USE TWICE DAILY TO CHECK GLUCOSE    metFORMIN (GLUCOPHAGE) 1000 MG tablet [Pharmacy Med Name: METFORMIN HYDROCHLORIDE 1000 MG Tablet] 180 tablet 3     Sig: Take 1 tablet by mouth 2 times daily (with meals)    Accu-Chek Softclix Lancets MISC [Pharmacy Med Name: Jose De Jesus Pap LANCETS   ] 200 each 3     Sig: USE TWICE DAILY TO CHECK GLUCOSE    potassium chloride (KLOR-CON M) 20 MEQ extended release tablet [Pharmacy Med Name: POTASSIUM CHLORIDE ER 20 MEQ Tablet Extended Release] 180 tablet 3     Sig: Take 1 tablet by mouth 2 times daily    empagliflozin (JARDIANCE) 25 MG tablet [Pharmacy Med Name: Juwan Sisi 25 MG Tablet] 90 tablet 3     Sig: Take 25 mg by mouth daily    simvastatin (ZOCOR) 20 MG tablet [Pharmacy Med Name: SIMVASTATIN 20 MG Tablet] 90 tablet 3     Sig: Take 1 tablet by mouth nightly    montelukast (SINGULAIR) 10 MG tablet [Pharmacy Med Name: MONTELUKAST SODIUM 10 MG Tablet] 90 tablet 3     Sig: Take 1 tablet by mouth nightly    Liraglutide (VICTOZA) 18 MG/3ML SOPN SC injection [Pharmacy Med Name: Lee Milton 25 MG/3ML Solution Pen-injector] 27 mL 3     Sig: Inject 1.8 mg into the skin daily    Alcohol Swabs (B-D SINGLE USE SWABS REGULAR) PADS [Pharmacy Med Name: BD SWABS SINGLE USE   Pad] 200 each 3     Sig: USE TWICE DAILY TO CHECK GLUCOSE    spironolactone (ALDACTONE) 50 MG tablet [Pharmacy Med Name: SPIRONOLACTONE 50 MG Tablet] 90 tablet 3     Sig: Take 1 tablet by mouth daily    losartan (COZAAR) 25 MG tablet [Pharmacy Med Name: LOSARTAN POTASSIUM 25 MG Tablet] 90 tablet 3     Sig: Take 1 tablet by mouth daily .
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
## 2024-01-04 LAB
BASOPHILS # BLD AUTO: 0.02 K/UL — SIGNIFICANT CHANGE UP (ref 0–0.2)
BASOPHILS # BLD AUTO: 0.02 K/UL — SIGNIFICANT CHANGE UP (ref 0–0.2)
BASOPHILS NFR BLD AUTO: 0.5 % — SIGNIFICANT CHANGE UP (ref 0–2)
BASOPHILS NFR BLD AUTO: 0.5 % — SIGNIFICANT CHANGE UP (ref 0–2)
CULTURE RESULTS: SIGNIFICANT CHANGE UP
CULTURE RESULTS: SIGNIFICANT CHANGE UP
EOSINOPHIL # BLD AUTO: 0.1 K/UL — SIGNIFICANT CHANGE UP (ref 0–0.5)
EOSINOPHIL # BLD AUTO: 0.1 K/UL — SIGNIFICANT CHANGE UP (ref 0–0.5)
EOSINOPHIL NFR BLD AUTO: 2.5 % — SIGNIFICANT CHANGE UP (ref 0–6)
EOSINOPHIL NFR BLD AUTO: 2.5 % — SIGNIFICANT CHANGE UP (ref 0–6)
HCT VFR BLD CALC: 31.6 % — LOW (ref 39–50)
HCT VFR BLD CALC: 31.6 % — LOW (ref 39–50)
HGB BLD-MCNC: 9.9 G/DL — LOW (ref 13–17)
HGB BLD-MCNC: 9.9 G/DL — LOW (ref 13–17)
IMM GRANULOCYTES NFR BLD AUTO: 0.5 % — SIGNIFICANT CHANGE UP (ref 0–0.9)
IMM GRANULOCYTES NFR BLD AUTO: 0.5 % — SIGNIFICANT CHANGE UP (ref 0–0.9)
LYMPHOCYTES # BLD AUTO: 1.24 K/UL — SIGNIFICANT CHANGE UP (ref 1–3.3)
LYMPHOCYTES # BLD AUTO: 1.24 K/UL — SIGNIFICANT CHANGE UP (ref 1–3.3)
LYMPHOCYTES # BLD AUTO: 31 % — SIGNIFICANT CHANGE UP (ref 13–44)
LYMPHOCYTES # BLD AUTO: 31 % — SIGNIFICANT CHANGE UP (ref 13–44)
MCHC RBC-ENTMCNC: 28.7 PG — SIGNIFICANT CHANGE UP (ref 27–34)
MCHC RBC-ENTMCNC: 28.7 PG — SIGNIFICANT CHANGE UP (ref 27–34)
MCHC RBC-ENTMCNC: 31.3 GM/DL — LOW (ref 32–36)
MCHC RBC-ENTMCNC: 31.3 GM/DL — LOW (ref 32–36)
MCV RBC AUTO: 91.6 FL — SIGNIFICANT CHANGE UP (ref 80–100)
MCV RBC AUTO: 91.6 FL — SIGNIFICANT CHANGE UP (ref 80–100)
MONOCYTES # BLD AUTO: 0.38 K/UL — SIGNIFICANT CHANGE UP (ref 0–0.9)
MONOCYTES # BLD AUTO: 0.38 K/UL — SIGNIFICANT CHANGE UP (ref 0–0.9)
MONOCYTES NFR BLD AUTO: 9.5 % — SIGNIFICANT CHANGE UP (ref 2–14)
MONOCYTES NFR BLD AUTO: 9.5 % — SIGNIFICANT CHANGE UP (ref 2–14)
NEUTROPHILS # BLD AUTO: 2.24 K/UL — SIGNIFICANT CHANGE UP (ref 1.8–7.4)
NEUTROPHILS # BLD AUTO: 2.24 K/UL — SIGNIFICANT CHANGE UP (ref 1.8–7.4)
NEUTROPHILS NFR BLD AUTO: 56 % — SIGNIFICANT CHANGE UP (ref 43–77)
NEUTROPHILS NFR BLD AUTO: 56 % — SIGNIFICANT CHANGE UP (ref 43–77)
NRBC # BLD: 0 /100 WBCS — SIGNIFICANT CHANGE UP (ref 0–0)
NRBC # BLD: 0 /100 WBCS — SIGNIFICANT CHANGE UP (ref 0–0)
PLATELET # BLD AUTO: 121 K/UL — LOW (ref 150–400)
PLATELET # BLD AUTO: 121 K/UL — LOW (ref 150–400)
RBC # BLD: 3.45 M/UL — LOW (ref 4.2–5.8)
RBC # BLD: 3.45 M/UL — LOW (ref 4.2–5.8)
RBC # FLD: 14.2 % — SIGNIFICANT CHANGE UP (ref 10.3–14.5)
RBC # FLD: 14.2 % — SIGNIFICANT CHANGE UP (ref 10.3–14.5)
SPECIMEN SOURCE: SIGNIFICANT CHANGE UP
SPECIMEN SOURCE: SIGNIFICANT CHANGE UP
WBC # BLD: 4 K/UL — SIGNIFICANT CHANGE UP (ref 3.8–10.5)
WBC # BLD: 4 K/UL — SIGNIFICANT CHANGE UP (ref 3.8–10.5)
WBC # FLD AUTO: 4 K/UL — SIGNIFICANT CHANGE UP (ref 3.8–10.5)
WBC # FLD AUTO: 4 K/UL — SIGNIFICANT CHANGE UP (ref 3.8–10.5)

## 2024-01-04 RX ORDER — DEXTROSE 50 % IN WATER 50 %
15 SYRINGE (ML) INTRAVENOUS ONCE
Refills: 0 | Status: DISCONTINUED | OUTPATIENT
Start: 2024-01-04 | End: 2024-01-07

## 2024-01-04 RX ORDER — DIVALPROEX SODIUM 500 MG/1
500 TABLET, DELAYED RELEASE ORAL ONCE
Refills: 0 | Status: COMPLETED | OUTPATIENT
Start: 2024-01-04 | End: 2024-01-04

## 2024-01-04 RX ORDER — HALOPERIDOL DECANOATE 100 MG/ML
3 INJECTION INTRAMUSCULAR ONCE
Refills: 0 | Status: COMPLETED | OUTPATIENT
Start: 2024-01-04 | End: 2024-01-04

## 2024-01-04 RX ORDER — HALOPERIDOL DECANOATE 100 MG/ML
2 INJECTION INTRAMUSCULAR ONCE
Refills: 0 | Status: COMPLETED | OUTPATIENT
Start: 2024-01-04 | End: 2024-01-04

## 2024-01-04 RX ORDER — HALOPERIDOL DECANOATE 100 MG/ML
2 INJECTION INTRAMUSCULAR EVERY 6 HOURS
Refills: 0 | Status: DISCONTINUED | OUTPATIENT
Start: 2024-01-04 | End: 2024-01-09

## 2024-01-04 RX ORDER — SODIUM BICARBONATE 1 MEQ/ML
650 SYRINGE (ML) INTRAVENOUS THREE TIMES A DAY
Refills: 0 | Status: DISCONTINUED | OUTPATIENT
Start: 2024-01-04 | End: 2024-01-11

## 2024-01-04 RX ORDER — HALOPERIDOL DECANOATE 100 MG/ML
2 INJECTION INTRAMUSCULAR ONCE
Refills: 0 | Status: DISCONTINUED | OUTPATIENT
Start: 2024-01-04 | End: 2024-01-04

## 2024-01-04 RX ORDER — INSULIN LISPRO 100/ML
VIAL (ML) SUBCUTANEOUS
Refills: 0 | Status: DISCONTINUED | OUTPATIENT
Start: 2024-01-04 | End: 2024-01-07

## 2024-01-04 RX ORDER — CLOPIDOGREL BISULFATE 75 MG/1
75 TABLET, FILM COATED ORAL DAILY
Refills: 0 | Status: DISCONTINUED | OUTPATIENT
Start: 2024-01-04 | End: 2024-01-07

## 2024-01-04 RX ORDER — TAMSULOSIN HYDROCHLORIDE 0.4 MG/1
0.4 CAPSULE ORAL AT BEDTIME
Refills: 0 | Status: DISCONTINUED | OUTPATIENT
Start: 2024-01-04 | End: 2024-01-07

## 2024-01-04 RX ORDER — DEXTROSE 50 % IN WATER 50 %
25 SYRINGE (ML) INTRAVENOUS ONCE
Refills: 0 | Status: DISCONTINUED | OUTPATIENT
Start: 2024-01-04 | End: 2024-01-07

## 2024-01-04 RX ORDER — DEXTROSE 50 % IN WATER 50 %
12.5 SYRINGE (ML) INTRAVENOUS ONCE
Refills: 0 | Status: DISCONTINUED | OUTPATIENT
Start: 2024-01-04 | End: 2024-01-07

## 2024-01-04 RX ORDER — DIVALPROEX SODIUM 500 MG/1
500 TABLET, DELAYED RELEASE ORAL
Refills: 0 | Status: DISCONTINUED | OUTPATIENT
Start: 2024-01-04 | End: 2024-01-07

## 2024-01-04 RX ORDER — ISOSORBIDE MONONITRATE 60 MG/1
30 TABLET, EXTENDED RELEASE ORAL DAILY
Refills: 0 | Status: DISCONTINUED | OUTPATIENT
Start: 2024-01-04 | End: 2024-01-07

## 2024-01-04 RX ORDER — SODIUM CHLORIDE 9 MG/ML
1000 INJECTION, SOLUTION INTRAVENOUS
Refills: 0 | Status: DISCONTINUED | OUTPATIENT
Start: 2024-01-04 | End: 2024-01-07

## 2024-01-04 RX ORDER — GLUCAGON INJECTION, SOLUTION 0.5 MG/.1ML
1 INJECTION, SOLUTION SUBCUTANEOUS ONCE
Refills: 0 | Status: DISCONTINUED | OUTPATIENT
Start: 2024-01-04 | End: 2024-01-07

## 2024-01-04 RX ORDER — INSULIN GLARGINE 100 [IU]/ML
8 INJECTION, SOLUTION SUBCUTANEOUS AT BEDTIME
Refills: 0 | Status: DISCONTINUED | OUTPATIENT
Start: 2024-01-04 | End: 2024-01-14

## 2024-01-04 RX ORDER — DIVALPROEX SODIUM 500 MG/1
500 TABLET, DELAYED RELEASE ORAL ONCE
Refills: 0 | Status: DISCONTINUED | OUTPATIENT
Start: 2024-01-04 | End: 2024-01-04

## 2024-01-04 RX ORDER — HYDRALAZINE HCL 50 MG
25 TABLET ORAL THREE TIMES A DAY
Refills: 0 | Status: DISCONTINUED | OUTPATIENT
Start: 2024-01-04 | End: 2024-01-07

## 2024-01-04 RX ORDER — FINASTERIDE 5 MG/1
5 TABLET, FILM COATED ORAL DAILY
Refills: 0 | Status: DISCONTINUED | OUTPATIENT
Start: 2024-01-04 | End: 2024-01-07

## 2024-01-04 RX ORDER — METOPROLOL TARTRATE 50 MG
100 TABLET ORAL DAILY
Refills: 0 | Status: DISCONTINUED | OUTPATIENT
Start: 2024-01-04 | End: 2024-01-07

## 2024-01-04 RX ORDER — TRAZODONE HCL 50 MG
100 TABLET ORAL AT BEDTIME
Refills: 0 | Status: DISCONTINUED | OUTPATIENT
Start: 2024-01-04 | End: 2024-01-07

## 2024-01-04 RX ADMIN — Medication 2 MILLIGRAM(S): at 12:15

## 2024-01-04 RX ADMIN — HALOPERIDOL DECANOATE 2 MILLIGRAM(S): 100 INJECTION INTRAMUSCULAR at 12:15

## 2024-01-04 RX ADMIN — HALOPERIDOL DECANOATE 3 MILLIGRAM(S): 100 INJECTION INTRAMUSCULAR at 12:15

## 2024-01-04 RX ADMIN — INSULIN GLARGINE 8 UNIT(S): 100 INJECTION, SOLUTION SUBCUTANEOUS at 22:43
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
## 2024-01-04 NOTE — ED BEHAVIORAL HEALTH PROGRESS NOTE - PSYCHIATRIC ISSUES AND PLAN (INCLUDE STANDING AND PRN MEDICATION)
Admit to inpatient psychiatric hospital Depakote ER 500mg p.o. BID, Haldol 2mg p.o. at bedtime  PRN: Haldol 2-5mg/Ativan 1-2mg IV Q6H PRN
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
## 2024-01-04 NOTE — ED BEHAVIORAL HEALTH PROGRESS NOTE - DETAILS
Spoke with GRETCHEN Winkler and informed him the C paperwork has been started for the patient  Bed pending

## 2024-01-04 NOTE — CONSULT NOTE ADULT - ASSESSMENT
77 yo male h/o schizophrenia, dementia, agitation, ckd, dm, htn, presenting to ER with agitation    schizophrenia/dementia/agitation  psy following  plan for inpt psy admission   psy meds as per psy    ckd  stable   creat at baseline  pt makes urine  no need for urgent HD  to f/u with outpt nephrologist as noted in nephrology consult note from 12/27/23    HTN  resume outpt bp meds    dm  lantus and iss   monitor fs    d/w ER staff

## 2024-01-04 NOTE — ED BEHAVIORAL HEALTH PROGRESS NOTE - OTHER
Patient was sedated at time of assessment, however, code grey was called earlier due to patients agitation and aggression towards staff in the ED. Patient was sedated and his limited speech was jumbled  Bed pending

## 2024-01-04 NOTE — ED BEHAVIORAL HEALTH PROGRESS NOTE - BILLING CODES
32275-[id number] hospital care - low complexity 20-29 minutes 21050-[id number] hospital care - low complexity 20-29 minutes

## 2024-01-04 NOTE — ED BEHAVIORAL HEALTH NOTE - BEHAVIORAL HEALTH NOTE
Telepsychiatry team contacted ED staff who indicated that the pt was still asleep and  each time he's been awake he becomes combative. As such, Telepsychiatry team recommended not waking pt up due to concerns he may become aggressive again in the ED.    This writer contacted the pt's NH for collateral and spoke with the RN. Per her collateral: The pt has a longstanding h/o becoming physically combative in the setting of dementia, but has become progressively more agitated and has been sent out to various emergency rooms since he returned to the NH on 12/28/23. In addition, the RN was unsure whether he has been taking his medications as prescribed, but states he has consistently refused to go to his dialysis appointments. The pt otherwise has not expressed SI or urges to harm himself and has not evidenced signs of acute psychosis or fili.     A/P  Pt is a 79 yo M, resides at the [facility] significant for major neurocognitive disorder, CKD(is supposed to be on dialysis), AFib, HTN, HLD, BPH, and DM, with psych /o Schizoaffective disorder), in outpt psychiatric care with Dr. Dupree, was last known to be prescribed Depakote  mg BID, Trazodone 100 mg qHS, Haldol 2 mg qHS, and Melatonin 3 mg qHS, with longstanding history of aggressive behavior in the setting of poor impulse control, who presents to the ED sent in by NH for aggressive toward staff at his facility.    Since arriving to the ED, the pt has continued to be combative with staff whenever awake and has been unable to engage in interview. However, collateral from NH staff indicates he has a longstanding h/o aggression and impulsivity and he has been consistently refusing his dialysis treatments and possibly his oral meds as well. However, he has not expressed recent SI or urges to harm himself and has not evidenced signs of acute fili, psychosis, or depression. Based on this history, this writer suspects the pt's aggression and combativeness stem from his poor impulse control and low frustration tolerance, which are tied to his underlying neurocognitive disorder, and not an acute psychiatric condition that would potentially be mitigated by an inpatient psychiatric hospitalization(ie fili, psychosis, depression, etc). As such, he does not meet criteria for psychiatric hospitalization at this time. However, there is concern that delirium may be contributing to his presentation, given his numerous risk factors, and he may benefit from a medical admission for further work-up and management.     -Admit to medicine   -Place on 1:1 for safety  -Consult [facility] psychiatry for med mgmt recs for agitation  -Continue with outpt regimen, which was last known to be Depakote  mg BID, Trazodone 100 mg qHS, Haldol 2 mg qHS(discontinue if QTC>500), and Melatonin 3 mg qHS  -For severe agitation in which pt is in acute risk of harm and not re-directable can offer IV/IM Haldol 2-4 mg q6H PRN. If given, recommend a repeat ecg and hold antipsychotics if QTC>500  -Maintain delirium precautions, which include: establishing a consistent routine, reorientation techniques and memory cues such as a calendar, clocks, and family photos, an environment that is stable, quiet, and well-lit, using a bed near a window, reducing unnecessary ambient sound and using earplugs at night, use of eyeglasses and hearing aids if needed, encouraging family to visit, explaining proceedings at every opportunity, avoiding physical restraint unless absolutely necessary, and avoiding antihistamines, anticholinergics, and benzodiazepines Telepsychiatry team contacted ED staff who indicated that the pt was still asleep and  each time he's been awake he becomes combative. As such, Telepsychiatry team recommended not waking pt up due to concerns he may become aggressive again in the ED.    This writer contacted the pt's NH for collateral and spoke with the RN. Per her collateral: The pt has a longstanding h/o becoming physically combative in the setting of dementia, but has become progressively more agitated and has been sent out to various emergency rooms since he returned to the NH on 12/28/23. In addition, the RN was unsure whether he has been taking his medications as prescribed, but states he has consistently refused to go to his dialysis appointments. The pt otherwise has not expressed SI or urges to harm himself and has not evidenced signs of acute psychosis or fili.     A/P  Pt is a 79 yo M, resides at the [facility] significant for major neurocognitive disorder, CKD(is supposed to be on dialysis), AFib, HTN, HLD, BPH, and DM, with psych /o Schizoaffective disorder), in outpt psychiatric care with Dr. Dupree, was last known to be prescribed Depakote  mg BID, Trazodone 100 mg qHS, Haldol 2 mg qHS, and Melatonin 3 mg qHS, with longstanding history of aggressive behavior in the setting of poor impulse control, who presents to the ED sent in by NH for aggressive toward staff at his facility.    Since arriving to the ED, the pt has continued to be combative with staff whenever awake and has been unable to engage in interview. However, collateral from NH staff indicates he has a longstanding h/o aggression and impulsivity and he has been consistently refusing his dialysis treatments and possibly his oral meds as well. However, he has not expressed recent SI or urges to harm himself and has not evidenced signs of acute fili, psychosis, or depression. Based on this history, this writer suspects the pt's aggression and combativeness stem from his poor impulse control and low frustration tolerance, which are tied to his underlying neurocognitive disorder, and not an acute psychiatric condition that would potentially be mitigated by an inpatient psychiatric hospitalization(ie fili, psychosis, depression, etc). As such, he does not meet criteria for psychiatric hospitalization at this time. However, there is concern that delirium may be contributing to his presentation, given his numerous risk factors, and he may benefit from a medical admission for further work-up and management.     -Admit to medicine   -Place on 1:1 for safety  -Consult [facility] psychiatry for med mgmt recs for agitation  -Continue with outpt regimen, which was last known to be Depakote  mg BID, Trazodone 100 mg qHS, Haldol 2 mg qHS(discontinue if QTC>500), and Melatonin 3 mg qHS  -For severe agitation in which pt is in acute risk of harm and not re-directable can offer IV/IM Haldol 2-4 mg q6H PRN. If given, recommend a repeat ecg and hold antipsychotics if QTC>500  -Maintain delirium precautions, which include: establishing a consistent routine, reorientation techniques and memory cues such as a calendar, clocks, and family photos, an environment that is stable, quiet, and well-lit, using a bed near a window, reducing unnecessary ambient sound and using earplugs at night, use of eyeglasses and hearing aids if needed, encouraging family to visit, explaining proceedings at every opportunity, avoiding physical restraint unless absolutely necessary, and avoiding antihistamines, anticholinergics, and benzodiazepines

## 2024-01-04 NOTE — ED BEHAVIORAL HEALTH PROGRESS NOTE - COLLATERAL INFORMATION (NAME, PHONE, RELATIONSHIP):
Per PCA at bedside, patient was sedated and the woke up at about 12:15 and become extremely agitated and aggressive. She states he was "swinging" at the staff and that an code grey was called. She states the patient then "received medication" and has been calm since then.

## 2024-01-05 DIAGNOSIS — N18.5 CHRONIC KIDNEY DISEASE, STAGE 5: ICD-10-CM

## 2024-01-05 LAB
A1C WITH ESTIMATED AVERAGE GLUCOSE RESULT: 5.1 % — SIGNIFICANT CHANGE UP (ref 4–5.6)
A1C WITH ESTIMATED AVERAGE GLUCOSE RESULT: 5.1 % — SIGNIFICANT CHANGE UP (ref 4–5.6)
ALBUMIN SERPL ELPH-MCNC: 3.2 G/DL — LOW (ref 3.3–5)
ALBUMIN SERPL ELPH-MCNC: 3.2 G/DL — LOW (ref 3.3–5)
ALP SERPL-CCNC: 96 U/L — SIGNIFICANT CHANGE UP (ref 40–120)
ALP SERPL-CCNC: 96 U/L — SIGNIFICANT CHANGE UP (ref 40–120)
ALT FLD-CCNC: 7 U/L — LOW (ref 10–45)
ALT FLD-CCNC: 7 U/L — LOW (ref 10–45)
ANION GAP SERPL CALC-SCNC: 11 MMOL/L — SIGNIFICANT CHANGE UP (ref 5–17)
ANION GAP SERPL CALC-SCNC: 11 MMOL/L — SIGNIFICANT CHANGE UP (ref 5–17)
ANION GAP SERPL CALC-SCNC: 15 MMOL/L — SIGNIFICANT CHANGE UP (ref 5–17)
ANION GAP SERPL CALC-SCNC: 15 MMOL/L — SIGNIFICANT CHANGE UP (ref 5–17)
ANISOCYTOSIS BLD QL: SLIGHT — SIGNIFICANT CHANGE UP
ANISOCYTOSIS BLD QL: SLIGHT — SIGNIFICANT CHANGE UP
AST SERPL-CCNC: 16 U/L — SIGNIFICANT CHANGE UP (ref 10–40)
AST SERPL-CCNC: 16 U/L — SIGNIFICANT CHANGE UP (ref 10–40)
BASOPHILS # BLD AUTO: 0.03 K/UL — SIGNIFICANT CHANGE UP (ref 0–0.2)
BASOPHILS NFR BLD AUTO: 0.7 % — SIGNIFICANT CHANGE UP (ref 0–2)
BASOPHILS NFR BLD AUTO: 0.7 % — SIGNIFICANT CHANGE UP (ref 0–2)
BASOPHILS NFR BLD AUTO: 0.9 % — SIGNIFICANT CHANGE UP (ref 0–2)
BASOPHILS NFR BLD AUTO: 0.9 % — SIGNIFICANT CHANGE UP (ref 0–2)
BILIRUB SERPL-MCNC: 0.2 MG/DL — SIGNIFICANT CHANGE UP (ref 0.2–1.2)
BILIRUB SERPL-MCNC: 0.2 MG/DL — SIGNIFICANT CHANGE UP (ref 0.2–1.2)
BUN SERPL-MCNC: 90 MG/DL — HIGH (ref 7–23)
BURR CELLS BLD QL SMEAR: PRESENT — SIGNIFICANT CHANGE UP
BURR CELLS BLD QL SMEAR: PRESENT — SIGNIFICANT CHANGE UP
CALCIUM SERPL-MCNC: 7.7 MG/DL — LOW (ref 8.4–10.5)
CALCIUM SERPL-MCNC: 7.7 MG/DL — LOW (ref 8.4–10.5)
CALCIUM SERPL-MCNC: 8.2 MG/DL — LOW (ref 8.4–10.5)
CALCIUM SERPL-MCNC: 8.2 MG/DL — LOW (ref 8.4–10.5)
CHLORIDE SERPL-SCNC: 115 MMOL/L — HIGH (ref 96–108)
CHLORIDE SERPL-SCNC: 115 MMOL/L — HIGH (ref 96–108)
CHLORIDE SERPL-SCNC: 116 MMOL/L — HIGH (ref 96–108)
CHLORIDE SERPL-SCNC: 116 MMOL/L — HIGH (ref 96–108)
CO2 SERPL-SCNC: 14 MMOL/L — LOW (ref 22–31)
CO2 SERPL-SCNC: 14 MMOL/L — LOW (ref 22–31)
CO2 SERPL-SCNC: 16 MMOL/L — LOW (ref 22–31)
CO2 SERPL-SCNC: 16 MMOL/L — LOW (ref 22–31)
CREAT SERPL-MCNC: 4.8 MG/DL — HIGH (ref 0.5–1.3)
CREAT SERPL-MCNC: 4.8 MG/DL — HIGH (ref 0.5–1.3)
CREAT SERPL-MCNC: 5.01 MG/DL — HIGH (ref 0.5–1.3)
CREAT SERPL-MCNC: 5.01 MG/DL — HIGH (ref 0.5–1.3)
EGFR: 11 ML/MIN/1.73M2 — LOW
EGFR: 11 ML/MIN/1.73M2 — LOW
EGFR: 12 ML/MIN/1.73M2 — LOW
EGFR: 12 ML/MIN/1.73M2 — LOW
EOSINOPHIL # BLD AUTO: 0.11 K/UL — SIGNIFICANT CHANGE UP (ref 0–0.5)
EOSINOPHIL # BLD AUTO: 0.11 K/UL — SIGNIFICANT CHANGE UP (ref 0–0.5)
EOSINOPHIL # BLD AUTO: 0.14 K/UL — SIGNIFICANT CHANGE UP (ref 0–0.5)
EOSINOPHIL # BLD AUTO: 0.14 K/UL — SIGNIFICANT CHANGE UP (ref 0–0.5)
EOSINOPHIL NFR BLD AUTO: 2.4 % — SIGNIFICANT CHANGE UP (ref 0–6)
EOSINOPHIL NFR BLD AUTO: 2.4 % — SIGNIFICANT CHANGE UP (ref 0–6)
EOSINOPHIL NFR BLD AUTO: 4.4 % — SIGNIFICANT CHANGE UP (ref 0–6)
EOSINOPHIL NFR BLD AUTO: 4.4 % — SIGNIFICANT CHANGE UP (ref 0–6)
ESTIMATED AVERAGE GLUCOSE: 100 MG/DL — SIGNIFICANT CHANGE UP (ref 68–114)
ESTIMATED AVERAGE GLUCOSE: 100 MG/DL — SIGNIFICANT CHANGE UP (ref 68–114)
GLUCOSE SERPL-MCNC: 50 MG/DL — CRITICAL LOW (ref 70–99)
GLUCOSE SERPL-MCNC: 50 MG/DL — CRITICAL LOW (ref 70–99)
GLUCOSE SERPL-MCNC: 56 MG/DL — LOW (ref 70–99)
GLUCOSE SERPL-MCNC: 56 MG/DL — LOW (ref 70–99)
HCT VFR BLD CALC: 23.7 % — LOW (ref 39–50)
HCT VFR BLD CALC: 23.7 % — LOW (ref 39–50)
HCT VFR BLD CALC: 30.7 % — LOW (ref 39–50)
HCT VFR BLD CALC: 30.7 % — LOW (ref 39–50)
HGB BLD-MCNC: 7.1 G/DL — LOW (ref 13–17)
HGB BLD-MCNC: 7.1 G/DL — LOW (ref 13–17)
HGB BLD-MCNC: 9.4 G/DL — LOW (ref 13–17)
HGB BLD-MCNC: 9.4 G/DL — LOW (ref 13–17)
IMM GRANULOCYTES NFR BLD AUTO: 0.2 % — SIGNIFICANT CHANGE UP (ref 0–0.9)
IMM GRANULOCYTES NFR BLD AUTO: 0.2 % — SIGNIFICANT CHANGE UP (ref 0–0.9)
LYMPHOCYTES # BLD AUTO: 0.53 K/UL — LOW (ref 1–3.3)
LYMPHOCYTES # BLD AUTO: 0.53 K/UL — LOW (ref 1–3.3)
LYMPHOCYTES # BLD AUTO: 1.12 K/UL — SIGNIFICANT CHANGE UP (ref 1–3.3)
LYMPHOCYTES # BLD AUTO: 1.12 K/UL — SIGNIFICANT CHANGE UP (ref 1–3.3)
LYMPHOCYTES # BLD AUTO: 16.8 % — SIGNIFICANT CHANGE UP (ref 13–44)
LYMPHOCYTES # BLD AUTO: 16.8 % — SIGNIFICANT CHANGE UP (ref 13–44)
LYMPHOCYTES # BLD AUTO: 24.5 % — SIGNIFICANT CHANGE UP (ref 13–44)
LYMPHOCYTES # BLD AUTO: 24.5 % — SIGNIFICANT CHANGE UP (ref 13–44)
MAGNESIUM SERPL-MCNC: 1.9 MG/DL — SIGNIFICANT CHANGE UP (ref 1.6–2.6)
MAGNESIUM SERPL-MCNC: 1.9 MG/DL — SIGNIFICANT CHANGE UP (ref 1.6–2.6)
MANUAL SMEAR VERIFICATION: SIGNIFICANT CHANGE UP
MANUAL SMEAR VERIFICATION: SIGNIFICANT CHANGE UP
MCHC RBC-ENTMCNC: 28.3 PG — SIGNIFICANT CHANGE UP (ref 27–34)
MCHC RBC-ENTMCNC: 28.3 PG — SIGNIFICANT CHANGE UP (ref 27–34)
MCHC RBC-ENTMCNC: 28.7 PG — SIGNIFICANT CHANGE UP (ref 27–34)
MCHC RBC-ENTMCNC: 28.7 PG — SIGNIFICANT CHANGE UP (ref 27–34)
MCHC RBC-ENTMCNC: 30 GM/DL — LOW (ref 32–36)
MCHC RBC-ENTMCNC: 30 GM/DL — LOW (ref 32–36)
MCHC RBC-ENTMCNC: 30.6 GM/DL — LOW (ref 32–36)
MCHC RBC-ENTMCNC: 30.6 GM/DL — LOW (ref 32–36)
MCV RBC AUTO: 93.9 FL — SIGNIFICANT CHANGE UP (ref 80–100)
MCV RBC AUTO: 93.9 FL — SIGNIFICANT CHANGE UP (ref 80–100)
MCV RBC AUTO: 94.4 FL — SIGNIFICANT CHANGE UP (ref 80–100)
MCV RBC AUTO: 94.4 FL — SIGNIFICANT CHANGE UP (ref 80–100)
MONOCYTES # BLD AUTO: 0.08 K/UL — SIGNIFICANT CHANGE UP (ref 0–0.9)
MONOCYTES # BLD AUTO: 0.08 K/UL — SIGNIFICANT CHANGE UP (ref 0–0.9)
MONOCYTES # BLD AUTO: 0.47 K/UL — SIGNIFICANT CHANGE UP (ref 0–0.9)
MONOCYTES # BLD AUTO: 0.47 K/UL — SIGNIFICANT CHANGE UP (ref 0–0.9)
MONOCYTES NFR BLD AUTO: 10.3 % — SIGNIFICANT CHANGE UP (ref 2–14)
MONOCYTES NFR BLD AUTO: 10.3 % — SIGNIFICANT CHANGE UP (ref 2–14)
MONOCYTES NFR BLD AUTO: 2.7 % — SIGNIFICANT CHANGE UP (ref 2–14)
MONOCYTES NFR BLD AUTO: 2.7 % — SIGNIFICANT CHANGE UP (ref 2–14)
NEUTROPHILS # BLD AUTO: 2.35 K/UL — SIGNIFICANT CHANGE UP (ref 1.8–7.4)
NEUTROPHILS # BLD AUTO: 2.35 K/UL — SIGNIFICANT CHANGE UP (ref 1.8–7.4)
NEUTROPHILS # BLD AUTO: 2.83 K/UL — SIGNIFICANT CHANGE UP (ref 1.8–7.4)
NEUTROPHILS # BLD AUTO: 2.83 K/UL — SIGNIFICANT CHANGE UP (ref 1.8–7.4)
NEUTROPHILS NFR BLD AUTO: 61.9 % — SIGNIFICANT CHANGE UP (ref 43–77)
NEUTROPHILS NFR BLD AUTO: 61.9 % — SIGNIFICANT CHANGE UP (ref 43–77)
NEUTROPHILS NFR BLD AUTO: 75.2 % — SIGNIFICANT CHANGE UP (ref 43–77)
NEUTROPHILS NFR BLD AUTO: 75.2 % — SIGNIFICANT CHANGE UP (ref 43–77)
NRBC # BLD: 0 /100 WBCS — SIGNIFICANT CHANGE UP (ref 0–0)
NRBC # BLD: 0 /100 WBCS — SIGNIFICANT CHANGE UP (ref 0–0)
OVALOCYTES BLD QL SMEAR: SLIGHT — SIGNIFICANT CHANGE UP
OVALOCYTES BLD QL SMEAR: SLIGHT — SIGNIFICANT CHANGE UP
PLAT MORPH BLD: NORMAL — SIGNIFICANT CHANGE UP
PLAT MORPH BLD: NORMAL — SIGNIFICANT CHANGE UP
PLATELET # BLD AUTO: 100 K/UL — LOW (ref 150–400)
PLATELET # BLD AUTO: 100 K/UL — LOW (ref 150–400)
PLATELET # BLD AUTO: 125 K/UL — LOW (ref 150–400)
PLATELET # BLD AUTO: 125 K/UL — LOW (ref 150–400)
POIKILOCYTOSIS BLD QL AUTO: SLIGHT — SIGNIFICANT CHANGE UP
POIKILOCYTOSIS BLD QL AUTO: SLIGHT — SIGNIFICANT CHANGE UP
POTASSIUM SERPL-MCNC: 4.6 MMOL/L — SIGNIFICANT CHANGE UP (ref 3.5–5.3)
POTASSIUM SERPL-MCNC: 4.6 MMOL/L — SIGNIFICANT CHANGE UP (ref 3.5–5.3)
POTASSIUM SERPL-MCNC: 4.7 MMOL/L — SIGNIFICANT CHANGE UP (ref 3.5–5.3)
POTASSIUM SERPL-MCNC: 4.7 MMOL/L — SIGNIFICANT CHANGE UP (ref 3.5–5.3)
POTASSIUM SERPL-SCNC: 4.6 MMOL/L — SIGNIFICANT CHANGE UP (ref 3.5–5.3)
POTASSIUM SERPL-SCNC: 4.6 MMOL/L — SIGNIFICANT CHANGE UP (ref 3.5–5.3)
POTASSIUM SERPL-SCNC: 4.7 MMOL/L — SIGNIFICANT CHANGE UP (ref 3.5–5.3)
POTASSIUM SERPL-SCNC: 4.7 MMOL/L — SIGNIFICANT CHANGE UP (ref 3.5–5.3)
PROT SERPL-MCNC: 6.5 G/DL — SIGNIFICANT CHANGE UP (ref 6–8.3)
PROT SERPL-MCNC: 6.5 G/DL — SIGNIFICANT CHANGE UP (ref 6–8.3)
RBC # BLD: 2.51 M/UL — LOW (ref 4.2–5.8)
RBC # BLD: 2.51 M/UL — LOW (ref 4.2–5.8)
RBC # BLD: 3.27 M/UL — LOW (ref 4.2–5.8)
RBC # BLD: 3.27 M/UL — LOW (ref 4.2–5.8)
RBC # FLD: 14.4 % — SIGNIFICANT CHANGE UP (ref 10.3–14.5)
RBC # FLD: 14.4 % — SIGNIFICANT CHANGE UP (ref 10.3–14.5)
RBC # FLD: 14.5 % — SIGNIFICANT CHANGE UP (ref 10.3–14.5)
RBC # FLD: 14.5 % — SIGNIFICANT CHANGE UP (ref 10.3–14.5)
RBC BLD AUTO: ABNORMAL
RBC BLD AUTO: ABNORMAL
SODIUM SERPL-SCNC: 143 MMOL/L — SIGNIFICANT CHANGE UP (ref 135–145)
SODIUM SERPL-SCNC: 143 MMOL/L — SIGNIFICANT CHANGE UP (ref 135–145)
SODIUM SERPL-SCNC: 144 MMOL/L — SIGNIFICANT CHANGE UP (ref 135–145)
SODIUM SERPL-SCNC: 144 MMOL/L — SIGNIFICANT CHANGE UP (ref 135–145)
WBC # BLD: 3.13 K/UL — LOW (ref 3.8–10.5)
WBC # BLD: 3.13 K/UL — LOW (ref 3.8–10.5)
WBC # BLD: 4.57 K/UL — SIGNIFICANT CHANGE UP (ref 3.8–10.5)
WBC # BLD: 4.57 K/UL — SIGNIFICANT CHANGE UP (ref 3.8–10.5)
WBC # FLD AUTO: 3.13 K/UL — LOW (ref 3.8–10.5)
WBC # FLD AUTO: 3.13 K/UL — LOW (ref 3.8–10.5)
WBC # FLD AUTO: 4.57 K/UL — SIGNIFICANT CHANGE UP (ref 3.8–10.5)
WBC # FLD AUTO: 4.57 K/UL — SIGNIFICANT CHANGE UP (ref 3.8–10.5)

## 2024-01-05 RX ADMIN — Medication 650 MILLIGRAM(S): at 09:30

## 2024-01-05 RX ADMIN — Medication 25 MILLIGRAM(S): at 14:34

## 2024-01-05 RX ADMIN — Medication 650 MILLIGRAM(S): at 14:32

## 2024-01-05 RX ADMIN — INSULIN GLARGINE 8 UNIT(S): 100 INJECTION, SOLUTION SUBCUTANEOUS at 22:54

## 2024-01-05 RX ADMIN — Medication 5 MILLIGRAM(S): at 11:34

## 2024-01-05 RX ADMIN — CLOPIDOGREL BISULFATE 75 MILLIGRAM(S): 75 TABLET, FILM COATED ORAL at 11:33

## 2024-01-05 RX ADMIN — Medication 25 MILLIGRAM(S): at 23:18

## 2024-01-05 RX ADMIN — ISOSORBIDE MONONITRATE 30 MILLIGRAM(S): 60 TABLET, EXTENDED RELEASE ORAL at 14:37

## 2024-01-05 RX ADMIN — Medication 650 MILLIGRAM(S): at 23:18

## 2024-01-05 RX ADMIN — DIVALPROEX SODIUM 500 MILLIGRAM(S): 500 TABLET, DELAYED RELEASE ORAL at 23:27

## 2024-01-05 RX ADMIN — Medication 1: at 18:43

## 2024-01-05 RX ADMIN — Medication 25 MILLIGRAM(S): at 07:05

## 2024-01-05 RX ADMIN — DIVALPROEX SODIUM 500 MILLIGRAM(S): 500 TABLET, DELAYED RELEASE ORAL at 11:33

## 2024-01-05 RX ADMIN — Medication 100 MILLIGRAM(S): at 07:06

## 2024-01-05 RX ADMIN — TAMSULOSIN HYDROCHLORIDE 0.4 MILLIGRAM(S): 0.4 CAPSULE ORAL at 23:18

## 2024-01-05 RX ADMIN — FINASTERIDE 5 MILLIGRAM(S): 5 TABLET, FILM COATED ORAL at 11:33

## 2024-01-05 RX ADMIN — Medication 100 MILLIGRAM(S): at 23:18

## 2024-01-05 RX ADMIN — Medication 0.5 MILLIGRAM(S): at 23:27
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
## 2024-01-05 NOTE — ED BEHAVIORAL HEALTH PROGRESS NOTE - RISK ASSESSMENT
As per last visit, overall low risk for suicide attempt, denies si/hi, no hx attempts future oriented
As per last visit, overall low risk for suicide attempt, denies si/hi, no hx attempts future oriented
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
## 2024-01-05 NOTE — PROGRESS NOTE ADULT - ASSESSMENT
79 yo male h/o schizophrenia, dementia, agitation, ckd, dm, htn, presenting to ER with agitation    schizophrenia/dementia/agitation  psy following  plan for inpt psy admission 2PC  awaiting bed  psy meds as per psy    ckd  stable   creat at baseline  pt makes urine  no need for urgent HD  to f/u with outpt nephrologist as noted in nephrology consult note from 12/27/23    HTN  resume outpt bp meds    dm  lantus and iss   monitor fs    d/w ER staff   77 yo male h/o schizophrenia, dementia, agitation, ckd, dm, htn, presenting to ER with agitation    schizophrenia/dementia/agitation  psy following  plan for inpt psy admission 2PC  awaiting bed  psy meds as per psy    ckd  stable   creat at baseline  pt makes urine  no need for urgent HD  to f/u with outpt nephrologist as noted in nephrology consult note from 12/27/23    HTN  resume outpt bp meds    dm  lantus and iss   monitor fs    d/w ER staff

## 2024-01-05 NOTE — ED BEHAVIORAL HEALTH PROGRESS NOTE - ADDITIONAL DETAILS / COMMENTS
Due to patients agitation and aggression towards staff in the ED, he was sedated and the patient was unable to be fully assessed
Due to patients agitation and aggression towards staff in the ED, he was sedated and the patient was unable to be fully assessed
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
## 2024-01-05 NOTE — ED ADULT NURSE REASSESSMENT NOTE - DESCRIPTION
Pt awake, interacting with staff. Pt VS improved and he took his  meds.. Pt is loud but said he is heard of hearing. Redirected as needed. Maintained on CO for safety
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
## 2024-01-05 NOTE — ED ADULT NURSE REASSESSMENT NOTE - BP NONINVASIVE SYSTOLIC (MM HG)
PAT interview completed. Confirmed arrival date, time, location & ride home. Reinforced NPO status. Informed patient regarding visitor policy, possible sharing of day surgery room during hospital visit.    154

## 2024-01-05 NOTE — ED ADULT NURSE REASSESSMENT NOTE - DESCRIPTION
Pt bladder scan = 295 ml. Provider aware. Rn was informed that pt does not need dialysis at this time, Pt was also seen by PT and was observed ambulating in the hallway with some assistance. Pt bladder scan = 295 ml. Provider aware. Rn was informed that pt does not need dialysis at this time, Pt was also seen by PT and was observed ambulating in the hallway using a walker some assistance.

## 2024-01-05 NOTE — ED BEHAVIORAL HEALTH PROGRESS NOTE - MEDICAL WORKUP/TREATMENT SINCE LAST ASSESSMENT
Vitals.../Electrolytes.../Rules out underlying medical condition as cause of Psychiatric symptoms...
Vitals.../Electrolytes.../Rules out underlying medical condition as cause of Psychiatric symptoms...

## 2024-01-05 NOTE — ED BEHAVIORAL HEALTH PROGRESS NOTE - LACKING INFO FOR PSYCH DISPO
He does not need auth for his Pacer implant [id number] on 1/13/22 at [place] and it is a covered service per [employer]
Bedside interview...
N/A

## 2024-01-05 NOTE — ED BEHAVIORAL HEALTH PROGRESS NOTE - SOURCES CURRRENT EVALUATION
Collateral (personal, professional, ED staff, etc.).../Medical Record Reviewed...
Medical Record Reviewed...

## 2024-01-05 NOTE — ED BEHAVIORAL HEALTH PROGRESS NOTE - THERAPEUTIC INTERVENTIONS RECEIVED IN ED DETAILS FREE TEXT
Today a code grey was called and the patient received 5mg of Haldol IV Push and Ativan 2mg IV Push
Today a code grey was called and the patient received 5mg of Haldol IV Push and Ativan 2mg IV Push

## 2024-01-05 NOTE — ED BEHAVIORAL HEALTH PROGRESS NOTE - PRN MEDS RECIEVED IN ED DETAILS FREE TEXT
haloperidol    Injectable 2 milliGRAM(s) IV Push every 6 hours PRN agitation @12:15  haloperidol Injectable 3 milliGRAM(s) IV Push every 6 hours PRN agitation @12:15  Ativan  Injectable 2 milliGRAM(s) IV Push  PRN agitation @12:15  
haloperidol    Injectable 2 milliGRAM(s) IV Push every 6 hours PRN agitation @12:15  haloperidol Injectable 3 milliGRAM(s) IV Push every 6 hours PRN agitation @12:15  Ativan  Injectable 2 milliGRAM(s) IV Push  PRN agitation @12:15

## 2024-01-05 NOTE — ED BEHAVIORAL HEALTH PROGRESS NOTE - STANDING MEDS RECEIVED IN ED DETAILS FREE TEXT
MEDICATIONS  (STANDING):  clopidogrel Tablet 75 milliGRAM(s) Oral daily  dextrose 5%. 1000 milliLiter(s) (50 mL/Hr) IV Continuous <Continuous>  dextrose 5%. 1000 milliLiter(s) (100 mL/Hr) IV Continuous <Continuous>  dextrose 50% Injectable 25 Gram(s) IV Push once  dextrose 50% Injectable 25 Gram(s) IV Push once  dextrose 50% Injectable 12.5 Gram(s) IV Push once  divalproex  milliGRAM(s) Oral two times a day  finasteride 5 milliGRAM(s) Oral daily  glipiZIDE. 5 milliGRAM(s) Oral daily  glucagon  Injectable 1 milliGRAM(s) IntraMuscular once  hydrALAZINE 25 milliGRAM(s) Oral three times a day  insulin glargine Injectable (LANTUS) 8 Unit(s) SubCutaneous at bedtime  insulin lispro (ADMELOG) corrective regimen sliding scale   SubCutaneous three times a day before meals  isosorbide   mononitrate ER Tablet (IMDUR) 30 milliGRAM(s) Oral daily  metoprolol tartrate 100 milliGRAM(s) Oral daily  sodium bicarbonate 650 milliGRAM(s) Oral three times a day  tamsulosin 0.4 milliGRAM(s) Oral at bedtime  traZODone 100 milliGRAM(s) Oral at bedtime      
MEDICATIONS  (STANDING):  clopidogrel Tablet 75 milliGRAM(s) Oral daily  dextrose 5%. 1000 milliLiter(s) (50 mL/Hr) IV Continuous <Continuous>  dextrose 5%. 1000 milliLiter(s) (100 mL/Hr) IV Continuous <Continuous>  dextrose 50% Injectable 25 Gram(s) IV Push once  dextrose 50% Injectable 25 Gram(s) IV Push once  dextrose 50% Injectable 12.5 Gram(s) IV Push once  divalproex  milliGRAM(s) Oral two times a day  finasteride 5 milliGRAM(s) Oral daily  glipiZIDE. 5 milliGRAM(s) Oral daily  glucagon  Injectable 1 milliGRAM(s) IntraMuscular once  hydrALAZINE 25 milliGRAM(s) Oral three times a day  insulin glargine Injectable (LANTUS) 8 Unit(s) SubCutaneous at bedtime  insulin lispro (ADMELOG) corrective regimen sliding scale   SubCutaneous three times a day before meals  isosorbide   mononitrate ER Tablet (IMDUR) 30 milliGRAM(s) Oral daily  metoprolol tartrate 100 milliGRAM(s) Oral daily  sodium bicarbonate 650 milliGRAM(s) Oral three times a day  tamsulosin 0.4 milliGRAM(s) Oral at bedtime  traZODone 100 milliGRAM(s) Oral at bedtime
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
## 2024-01-05 NOTE — ED BEHAVIORAL HEALTH PROGRESS NOTE - ADDITIONAL COLLATERAL SOURCE (PERSONAL, PROFESSIONAL, ED STAFF, ETC.)...
Additional Collateral Source (personal, professional, ED staff, etc.)...
Additional Collateral Source (personal, professional, ED staff, etc.)...

## 2024-01-05 NOTE — CONSULT NOTE ADULT - ATTENDING COMMENTS
#CKD  Stage V, known since last admission December, [facility]  bun/cr stable  lytes stable, no urgent indication for dialysis  please check bladder scan and r/o urinary retention   #met acidosis  cont sodium bicarb tabs  bicarb level stable since last admission  #ok to transfer to [facility] once bladder scan done  #outpt nephrology follow up with outpt team  case d/w ER #CKD  Stage V, known since last admission December, [facility]  bun/cr stable  lytes stable, no urgent indication for dialysis  please check bladder scan and r/o urinary retention   #met acidosis  cont sodium bicarb tabs  bicarb level stable since last admission  #ok to transfer to [facility] once bladder scan done  #outpt nephrology follow up with outpt team  case d/w ER

## 2024-01-05 NOTE — PHYSICAL THERAPY INITIAL EVALUATION ADULT - ADDITIONAL COMMENTS
Per chart review, pt is wheelchair bound at baseline and is admitted from nursing home. Per chart review, pt is wheelchair bound at baseline and is admitted from nursing home. Pt does not own any DME.
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
## 2024-01-05 NOTE — ED ADULT NURSE REASSESSMENT NOTE - DESCRIPTION
Pt refused FS at 12, pt FS this am at breakfast was 100mg/dl and pt did not received insulin Pt refused FS at 12, pt FS this am at breakfast was 100mg/dl and pt did not received insulin. Pt has remained in good behavioral control and is easily redirected for his occasional inappropriate behavior. Staff noted pt defecated on himself, Staff assisted pt from his bed to the toilet located at his bedside, afterwards pt was able to wipe himself before he went back to bed. Pt bed sheets and gown was changed.   P- staff will ask pt q 3-4 hours if he needs to use the toilet/restroom as toileting schedule.

## 2024-01-05 NOTE — ED BEHAVIORAL HEALTH PROGRESS NOTE - DETAILS:
States the patient was evaluated by Dr. Ramesh Zambrano and does not meet criteria for medical admission to the hospital. He states Dr. Ramesh Zambrano believes the patient should be admitted to inpatient psychiatry.
States the patient was evaluated by Dr. Ramesh Zambrano and does not meet criteria for medical admission to the hospital. He states Dr. Ramesh Zambrano believes the patient should be admitted to inpatient psychiatry.

## 2024-01-05 NOTE — CONSULT NOTE ADULT - PROBLEM SELECTOR RECOMMENDATION 9
Pt with chronic kidney disease, known to be stage 5 from uncontrolled HTN. Was recently admitted to [facility] for similar reasons and noted to have a Scr of 5.04. Dr. Escobar saw patient while he was here and discussed with sister Eli Okeefe today  and Ms Cedillo at the [facility]- he attends his appointments regularly.     Now here with BUN/Cr of 90/5 stable from last admission. He is not uremic. K 4.6 and serum bicarb 14 today- unclear if patient is taking his prescribed bicarb tabs at home. Please continue with bicarb tabs 650 TID. No emergent need for dialysis, but patient needs close outpatient nephrology follow up.     Patient would like to get his transplant in [place]. Asked him to follow up with [place] [facility] or [facility].     If you have any questions, please feel free to contact me  Lukas Espitia  Nephrology Fellow  [phone]; Prefer [employer] TEAMS  (After 5pm or on weekends please page the on-call fellow). Pt with chronic kidney disease, known to be stage 5 from uncontrolled HTN. Was recently admitted to [facility] for similar reasons and noted to have a Scr of 5.04. Dr. Escobar saw patient while he was here and discussed with sister Eli Okeefe today  and Ms Cedillo at the [facility]- he attends his appointments regularly.     Now here with BUN/Cr of 90/5 stable from last admission. He is not uremic. K 4.6 and serum bicarb 14 today- unclear if patient is taking his prescribed bicarb tabs at home. Please continue with bicarb tabs 650 TID. No emergent need for dialysis, but patient needs close outpatient nephrology follow up.     Patient would like to get his transplant in [place]. Asked him to follow up with [place] [facility] or [facility].     If you have any questions, please feel free to contact me  Lukas Espitia  Nephrology Fellow  [phone]; Prefer [employer] TEAMS  (After 5pm or on weekends please page the on-call fellow). Pt with chronic kidney disease, known to be stage 5 from uncontrolled HTN. Was recently admitted to [facility] for similar reasons and noted to have a Scr of 5.04. Dr. Escobar saw patient while he was here and discussed with sister Eli Okeefe today  and Ms Cedillo at the [facility]- he attends his appointments regularly.   Please do a bladder scan to ensure no urinary retention. Repeat X1; If still retaining >300cc, may need a houser.  Now here with BUN/Cr of 90/5 stable from last admission. He is not uremic. K 4.6 and serum bicarb 14 today- unclear if patient is taking his prescribed bicarb tabs at home. Please continue with bicarb tabs 650 TID. No emergent need for dialysis, but patient needs close outpatient nephrology follow up.     Patient would like to get his transplant in [place]. Asked him to follow up with [place] [facility] or [facility].     If you have any questions, please feel free to contact me  Lukas Espitia  Nephrology Fellow  [phone]; Prefer [employer] TEAMS  (After 5pm or on weekends please page the on-call fellow). Pt with chronic kidney disease, known to be stage 5 from uncontrolled HTN. Was recently admitted to [facility] for similar reasons and noted to have a Scr of 5.04. Dr. Escobar saw patient while he was here and discussed with sister Eli Okeefe today  and Ms Cedillo at the [facility]- he attends his appointments regularly.   Please do a bladder scan to ensure no urinary retention. Repeat X1; If still retaining >300cc, may need a houser.  Now here with BUN/Cr of 90/5 stable from last admission. He is not uremic. K 4.6 and serum bicarb 14 today- unclear if patient is taking his prescribed bicarb tabs at home. Please continue with bicarb tabs 650 TID. No emergent need for dialysis, but patient needs close outpatient nephrology follow up.     Patient would like to get his transplant in [place]. Asked him to follow up with [place] [facility] or [facility].     If you have any questions, please feel free to contact me  Lukas Espitia  Nephrology Fellow  [phone]; Prefer [employer] TEAMS  (After 5pm or on weekends please page the on-call fellow). Pt with chronic kidney disease, known to be stage 5 from uncontrolled HTN. Was recently admitted to [facility] for similar reasons and noted to have a Scr of 5.04. Dr. Escobar saw patient while he was here and discussed with sister Eli Okeefe today  and Ms Cedillo at the [facility]- he attends his appointments regularly.   Please do a bladder scan to ensure no urinary retention. Repeat X1;    Now here with BUN/Cr of 90/5 stable from last admission. He is not uremic. K 4.6 and serum bicarb 14 today- unclear if patient is taking his prescribed bicarb tabs at home. Please continue with bicarb tabs 650 TID. No emergent need for dialysis, but patient needs close outpatient nephrology follow up.     Patient would like to get his transplant in [place]. Asked him to follow up with [place] [facility] or [facility].     If you have any questions, please feel free to contact me  Lukas Espitia  Nephrology Fellow  [phone]; Prefer [employer] TEAMS  (After 5pm or on weekends please page the on-call fellow). Pt with chronic kidney disease, known to be stage 5 from uncontrolled HTN. Was recently admitted to [facility] for similar reasons and noted to have a Scr of 5.04. Dr. Escobar saw patient while he was here and discussed with sister Eli Okeefe today  and Ms Cedillo at the [facility]- he attends his appointments regularly.   Please do a bladder scan to ensure no urinary retention. Repeat X1;    Now here with BUN/Cr of 90/5 stable from last admission. He is not uremic. K 4.6 and serum bicarb 14 today- unclear if patient is taking his prescribed bicarb tabs at home. Please continue with bicarb tabs 650 TID. No emergent need for dialysis, but patient needs close outpatient nephrology follow up.     Patient would like to get his transplant in [place]. Asked him to follow up with [place] [facility] or [facility].     If you have any questions, please feel free to contact me  Lukas Espitia  Nephrology Fellow  [phone]; Prefer [employer] TEAMS  (After 5pm or on weekends please page the on-call fellow).

## 2024-01-05 NOTE — ED BEHAVIORAL HEALTH NOTE - BEHAVIORAL HEALTH NOTE
NOTE:     ================     Re-assessment Note     ================     SOURCE:  RN and secondhand ED documentation.     BEHAVIOR: Per RN pt has been sleeping for the past 2-3 hours. RN stated when pt was awake he presented restless, and was not cooperative with vitals or taking medications, however re-directable verbally. RN denies pt becoming aggressive overnight. RN denies medications given overnight with him. RN denies visitors. RN reports pt continues to be in a gown and with a 1:1.

## 2024-01-06 LAB
APPEARANCE UR: CLEAR — SIGNIFICANT CHANGE UP
APPEARANCE UR: CLEAR — SIGNIFICANT CHANGE UP
BACTERIA # UR AUTO: NEGATIVE /HPF — SIGNIFICANT CHANGE UP
BACTERIA # UR AUTO: NEGATIVE /HPF — SIGNIFICANT CHANGE UP
BILIRUB UR-MCNC: NEGATIVE — SIGNIFICANT CHANGE UP
BILIRUB UR-MCNC: NEGATIVE — SIGNIFICANT CHANGE UP
CAST: 0 /LPF — SIGNIFICANT CHANGE UP (ref 0–4)
CAST: 0 /LPF — SIGNIFICANT CHANGE UP (ref 0–4)
COLOR SPEC: YELLOW — SIGNIFICANT CHANGE UP
COLOR SPEC: YELLOW — SIGNIFICANT CHANGE UP
DIFF PNL FLD: NEGATIVE — SIGNIFICANT CHANGE UP
DIFF PNL FLD: NEGATIVE — SIGNIFICANT CHANGE UP
FLUAV AG NPH QL: SIGNIFICANT CHANGE UP
FLUAV AG NPH QL: SIGNIFICANT CHANGE UP
FLUBV AG NPH QL: SIGNIFICANT CHANGE UP
FLUBV AG NPH QL: SIGNIFICANT CHANGE UP
GLUCOSE UR QL: 250 MG/DL
GLUCOSE UR QL: 250 MG/DL
KETONES UR-MCNC: NEGATIVE MG/DL — SIGNIFICANT CHANGE UP
KETONES UR-MCNC: NEGATIVE MG/DL — SIGNIFICANT CHANGE UP
LEUKOCYTE ESTERASE UR-ACNC: NEGATIVE — SIGNIFICANT CHANGE UP
LEUKOCYTE ESTERASE UR-ACNC: NEGATIVE — SIGNIFICANT CHANGE UP
NITRITE UR-MCNC: NEGATIVE — SIGNIFICANT CHANGE UP
NITRITE UR-MCNC: NEGATIVE — SIGNIFICANT CHANGE UP
PH UR: 5.5 — SIGNIFICANT CHANGE UP (ref 5–8)
PH UR: 5.5 — SIGNIFICANT CHANGE UP (ref 5–8)
PROT UR-MCNC: 100 MG/DL
PROT UR-MCNC: 100 MG/DL
RBC CASTS # UR COMP ASSIST: 0 /HPF — SIGNIFICANT CHANGE UP (ref 0–4)
RBC CASTS # UR COMP ASSIST: 0 /HPF — SIGNIFICANT CHANGE UP (ref 0–4)
RSV RNA NPH QL NAA+NON-PROBE: SIGNIFICANT CHANGE UP
RSV RNA NPH QL NAA+NON-PROBE: SIGNIFICANT CHANGE UP
SARS-COV-2 RNA SPEC QL NAA+PROBE: SIGNIFICANT CHANGE UP
SARS-COV-2 RNA SPEC QL NAA+PROBE: SIGNIFICANT CHANGE UP
SP GR SPEC: 1.01 — SIGNIFICANT CHANGE UP (ref 1–1.03)
SP GR SPEC: 1.01 — SIGNIFICANT CHANGE UP (ref 1–1.03)
SQUAMOUS # UR AUTO: 0 /HPF — SIGNIFICANT CHANGE UP (ref 0–5)
SQUAMOUS # UR AUTO: 0 /HPF — SIGNIFICANT CHANGE UP (ref 0–5)
UROBILINOGEN FLD QL: 0.2 MG/DL — SIGNIFICANT CHANGE UP (ref 0.2–1)
UROBILINOGEN FLD QL: 0.2 MG/DL — SIGNIFICANT CHANGE UP (ref 0.2–1)
WBC UR QL: 0 /HPF — SIGNIFICANT CHANGE UP (ref 0–5)
WBC UR QL: 0 /HPF — SIGNIFICANT CHANGE UP (ref 0–5)

## 2024-01-06 RX ADMIN — Medication 5 MILLIGRAM(S): at 12:30

## 2024-01-06 RX ADMIN — CLOPIDOGREL BISULFATE 75 MILLIGRAM(S): 75 TABLET, FILM COATED ORAL at 11:00

## 2024-01-06 RX ADMIN — ISOSORBIDE MONONITRATE 30 MILLIGRAM(S): 60 TABLET, EXTENDED RELEASE ORAL at 11:25

## 2024-01-06 RX ADMIN — Medication 25 MILLIGRAM(S): at 13:36

## 2024-01-06 RX ADMIN — Medication 650 MILLIGRAM(S): at 05:59

## 2024-01-06 RX ADMIN — DIVALPROEX SODIUM 500 MILLIGRAM(S): 500 TABLET, DELAYED RELEASE ORAL at 05:58

## 2024-01-06 RX ADMIN — FINASTERIDE 5 MILLIGRAM(S): 5 TABLET, FILM COATED ORAL at 11:30

## 2024-01-06 RX ADMIN — Medication 100 MILLIGRAM(S): at 05:58

## 2024-01-06 RX ADMIN — Medication 650 MILLIGRAM(S): at 13:36

## 2024-01-06 RX ADMIN — Medication 25 MILLIGRAM(S): at 05:58

## 2024-01-06 NOTE — ED ADULT NURSE REASSESSMENT NOTE - TEMPLATE LIST FOR HEAD TO TOE ASSESSMENT
Psych/Behavioral
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
## 2024-01-06 NOTE — ED ADULT NURSE REASSESSMENT NOTE - STATUS
2PC admission/awaiting bed, no change
awaiting bed, no change
awaiting bed, no change
2Pc admission/awaiting bed, no change
2 PC admission/awaiting bed, no change
psychiatric admission/awaiting bed, no change
awaiting bed, no change
2PC admission/awaiting bed, no change
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
## 2024-01-06 NOTE — ED ADULT NURSE REASSESSMENT NOTE - DESCRIPTION
Pt repeat FS at 0900 was =90mg/dl. Pt ate 2 cornflake cereal, 1 banana, I fruit cup and 8 oz milk. Dr Chow made aware, no orders made.  Pt maintained on CO for safety, pt was also seen/evaluated by Dr Sharif Psychiatrist.

## 2024-01-06 NOTE — ED ADULT NURSE REASSESSMENT NOTE - DESCRIPTION
Pt blood glucose this am = 69mg/dl. Pt ate bfast with appetite. was provided corn flakes, a banana and milk and FS will be repeated after 30 minutes. Attending aware, insulin coverage on hold this am. Continue 1:1 CO

## 2024-01-06 NOTE — PROGRESS NOTE BEHAVIORAL HEALTH - NSBHCHARTREVIEWVS_PSY_A_CORE FT
T(C): 36.4 (01-06-24 @ 05:52), Max: 36.4 (01-06-24 @ 05:52)  HR: 72 (01-06-24 @ 05:52) (64 - 72)  BP: 168/77 (01-06-24 @ 05:52) (154/73 - 185/52)  RR: 16 (01-05-24 @ 16:11) (16 - 18)  SpO2: 100% (01-06-24 @ 05:52) (98% - 100%)  Wt(kg): --

## 2024-01-06 NOTE — ED BEHAVIORAL HEALTH NOTE - BEHAVIORAL HEALTH NOTE
================     Re-assessment Note     ================     SOURCE:  MARLON Preciado and secondhand ED documentation.     BEHAVIOR:     RN described the patient to be loud, intrusive, irritated, but redirectable and under behavioral control. The patient was given Ativan at 1045p.

## 2024-01-06 NOTE — PROGRESS NOTE ADULT - ASSESSMENT
77 yo male h/o schizophrenia, dementia, agitation, ckd, dm, htn, presenting to ER with agitation    schizophrenia/dementia/agitation  psy following  plan for inpt psy admission 2PC  awaiting bed  psy meds as per psy    ckd  stable   creat at baseline  pt makes urine  no need for urgent HD  to f/u with outpt nephrologist as noted in nephrology consult note from 12/27/23  renal f/u noted    HTN  resume outpt bp meds    dm  lantus and iss   monitor fs    d/w ER staff

## 2024-01-06 NOTE — ED ADULT NURSE REASSESSMENT NOTE - DESCRIPTION
Pt remains labile and irritable, Challenging redirections  given by staff. Pt maintained on CO for safety

## 2024-01-06 NOTE — ED ADULT NURSE REASSESSMENT NOTE - DESCRIPTION
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 He is a transfer from [facility]. Primary Cardiologist Dr. Carla Lou from [facility].     Key events:  2/19 Transfer from [facility]       Events in the last 24 hours:  No acute issues. Not taking much PO.    Vital Signs:   Vital Last Value (24 Hour) 24 Hour Range   Temperature 97.5 °F (36.4 °C) (02/21/24 0200) Temp  Min: 97.2 °F (36.2 °C)  Max: 98.1 °F (36.7 °C)   Pulse 127 (02/21/24 0800) Pulse  Min: 120  Max: 162   Arterial   Blood Pressure (!) 71/30 (02/21/24 0800) Arterial Line BP  Min: 65/30  Max: 81/37   Non-Invasive  Blood Pressure   No data recorded   Central Venous Pressure (!) 12 mmHg (02/20/24 1700) CVP (mmHg)  Min: 1 mmHg  Max: 12 mmHg   Respiratory (!) 75 (02/21/24 0800) Resp  Min: 33  Max: 90   SpO2 97 % (02/21/24 0800) SpO2  Min: 86 %  Max: 98 %   cNIRS  No data recorded  No data recorded   rNIRS  No data recorded  Somatic Oximetry 2  Min: 86  Max: 92                 Dosing Weight: 2.4 kg (5 lb 4.7 oz) (2024  5:55 PM)  Weight: 2.6 kg (5 lb 11.7 oz) (02/20/24 2000)    Intake/Output         02/20 0700  02/21 0659 02/21 0700  02/22 0659    P.O. (mL/kg) 42 (16.15)     I.V. (mL/kg) 183.73 (70.67) 4.96 (1.91)    NG/GT (mL/kg) 85 (32.69)     IV Piggyback (mL/kg) 6.01 (2.31)     Total Intake(mL/kg) 316.74 (121.82) 4.96 (1.91)    Urine (mL/kg/hr) 168 (2.69)     Total Output(mL/kg) 168 (64.62)     Net +148.74 +4.96                             ECMO Settings:   Last Value (24 Hour) 24 Hour Range   Flow   No data recorded   RPM   No data recorded   Sweep   No data recorded   Delta P   No data recorded   Venous Pressure      SvO2   No data recorded            Vent Settings:     Setting Last Value (24 Hour)   Mode     TV Set      TV Observed     PIP Set     PIP Observed     GONZALES Level    PEEP     RR Set      RR Observed     PS     MAP     ITime     FiO2     SpO2 97 % (24 0800)    Nitric Oxide      EtCO2          Oxygen Therapy:                 Lines, Drains, & Airways:    CVC Double Lumen 23 (Active)   Number of days: 369       Arterial Line 23 2 Umbilical (Active)   Number of days: 369        Extubation readiness discussed: Not Applicable - Patient currently extubated.  Urinary catheter necessity discussed: Not applicable - Ontiveros previously removed  Central line necessity discussed: Fluid, blood products, vasoactive drug administration      VTE Score: 2  VTE treatments: Anticoagulation ordered     VLADIMIR Score:    CAPD Score: 0 (24 0600)  SBS Score:        Medications:  Continuous Infusions:    calcium gluconate 2,500 mg/50 mL in dextrose 5 % infusion syringe  20 mg/kg/hr (Dosing Weight) Intravenous    dextrose 10 % - NaCL 0.45% infusion   Intravenous    heparin 2 unit/mL in NaCL 0.9% solution  1 mL/hr Intra-arterial    heparin 2 unit/mL in NaCL 0.9% solution  1 mL/hr Intra-arterial    heparin (porcine) 2,500 units/25 mL in dextrose 5 % infusion syringe  10 Units/kg/hr (Dosing Weight) Intravenous     Scheduled Meds:    [START ON 2024] methylPREDNISolone (SOLU-Medrol) injection 24 mg Intravenous Once    heparin (porcine) 10 UNIT/ML lock flush 20 Units Intracatheter 2 times per day    heparin (porcine) 10 UNIT/ML lock flush 10 Units Intracatheter 3 times per day     PRN Meds:    calcium gluconate in D5W 120 mg /peds IVPB syringe  50 mg/kg (Dosing Weight) Intravenous PRN    magnesium sulfate 120 mg in dextrose 5% /peds IV syringe  50 mg/kg (Dosing Weight) Intravenous Q4H PRN    potassium CHLORIDE 2.4 mEq in SWFI pediatric IVPB syringe  1 mEq/kg (Dosing Weight) Intravenous Q4H PRN    heparin (porcine) 10 UNIT/ML lock flush 20 Units  2 mL Intracatheter PRN       Nutrition:  Breast Milk - 20 dorian/oz - 10 mls every 3 hours PO      Physical exam:   GEN:Active alert  HEENT: External  Ears and nose normal  CHEST:  RESP: Bilateral equal breath sounds  CV: S1+S2 +systolic murmur III/VI , on LLSB  ABD:Soft and non tender. No viscermegaly  NEURO: Tone appropriate for gestational age   MSK:Moving all four limbs   SKIN:Pink and no rashes      Labs:    Metabolic Panel  Sodium 140 2024   Potassium 3.7 2024   Chloride 109 2024   CO2 23 2024   BUN <2 2024   Creatinine 0.48 2024   Glucose 80 2024   Calcium 9.0 2024   Magnesium 1.3 2024   Phosphorus - -   Total Bilirubin 8.5 2024   AST/SGOT 34 2024   ALT/SGPT 10 2024   Alk Phosphatase 128 2024   Albumin 2.0 2024   Globulin 2.7 2024   Total Protein 4.7 2024     Complete Blood Count  WBC - -   HGB - -   HCT - -   PLT - -   Bands - -   Neutrophil - -   Lymphocyte - -   Monocyte - -   Eosinophil - -   Basophil - -   ANC - -   ALC - -        Arterial Blood Gas  pH 7.33 2024   pCO2 45 2024   pO2 59 2024   HCO3 24 2024   Base Excess -2 2024   O2 Sat 88 2024   Methemoglobin 1.6 2024   Lactic acid 0.9 2024   Calcium ionized 1.39 2024     Coagulation Profile  Protime 14.0 2024   INR 1.3 2024   PTT 46 2024   Heparin Level (antiXa) - -   Fibrinogen - -   D-Dimer - -     Infectious Disease   C-Reative Protein - -   Procalcitonin - -      Endocrine (Last 30 Days)  TSH - -   Free T3 - -   Free T4 - -         Pertinent Imaging:  Last Chest Xray:        XR CHEST AP OR PA    Impression  1. Stable chest appearance with no significant acute radiographic change.  2. Unremarkable abdomen.  .                Electronically Signed by: DORA LOUIE M.D.  Signed on: 2024 7:53 PM  Workstation ID: [id number]              Last ECG:  Encounter Date: 02/19/24   Electrocardiogram 12-Lead   Result Value    Ventricular Rate EKG/Min (BPM) 169    Atrial Rate (BPM) 169    WV-Interval (MSEC) 92    QRS-Interval (MSEC) 48    QT-Interval (MSEC) 256    QTc  429    P Axis (Degrees) 32    R Axis (Degrees) 76    T Axis (Degrees) 44    REPORT TEXT       **Poor data quality, interpretation may be adversely affected**  Normal sinus rhythm  Leftward axis  Left ventricular hypertrophy  Abnormal ECG  No previous ECGs available  Confirmed by JORDON FELDMAN MD ([id number]) on 2024 12:16:12 PM          Last Echocardiogram:  2/17/24 At [facility] ; 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     He is scheduled for surgery 2/23.    Not taking much PO.       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   I agree with the note as written.  I spoke to the parents.    Total Time: 60 min of critical care time was spent engaged in work directly related to patient care and/or available for direct patient care  Management: Bedside assessment and Supervision of care, Interpretation CXR, blood gases, ECG,  Pt FS is 118mg/dl. Pt os eating lunch. Pt does not need insulin coverage blood pressure, and cardiac output measures, Interventions hemodynamic mgmt and vent mgmt  Teaching Attestation: Seen and examined with Team, Plan of Care Developed together (with team), Documentation Developed together (with team)    Skyler Matute MD     PCICU Daily Note Version 3

## 2024-01-06 NOTE — PROGRESS NOTE BEHAVIORAL HEALTH - NSBHCHARTREVIEWINVESTIGATE_PSY_A_CORE FT
< from: 12 Lead ECG (01.04.24 @ 23:51) >      Ventricular Rate 69 BPM    Atrial Rate 69 BPM    P-R Interval 160 ms    QRS Duration 76 ms    Q-T Interval 398 ms    QTC Calculation(Bazett) 426 ms    P Axis 63 degrees    R Axis 72 degrees    T Axis 52 degrees    Diagnosis Line BASELINE ARTIFACT  NORMAL SINUS RHYTHM  NORMAL ECG  WHEN COMPARED WITH ECG OF 04-JAN-2024 12:45, (UNCONFIRMED)  NO SIGNIFICANT CHANGE WAS FOUND  Confirmed by MD Minh, Mukesh ([id number]) on 1/5/2024 7:37:24 PM    < end of copied text > < from: 12 Lead ECG (01.04.24 @ 23:51) >      Ventricular Rate 69 BPM    Atrial Rate 69 BPM    P-R Interval 160 ms    QRS Duration 76 ms    Q-T Interval 398 ms    QTC Calculation(Bazett) 426 ms    P Axis 63 degrees    R Axis 72 degrees    T Axis 52 degrees    Diagnosis Line BASELINE ARTIFACT  NORMAL SINUS RHYTHM  NORMAL ECG  WHEN COMPARED WITH ECG OF 04-JAN-2024 12:45, (UNCONFIRMED)  NO SIGNIFICANT CHANGE WAS FOUND  Confirmed by MD Minh, Mukesh ([id number]) on 1/5/2024 7:37:24 PM    < end of copied text >

## 2024-01-06 NOTE — ED ADULT NURSE REASSESSMENT NOTE - GENERAL PATIENT STATE
comfortable appearance
comfortable appearance
comfortable appearance/resting/sleeping
comfortable appearance
comfortable appearance/no change observed
comfortable appearance/resting/sleeping
comfortable appearance/resting/sleeping/smiling/interactive

## 2024-01-07 LAB
CULTURE RESULTS: SIGNIFICANT CHANGE UP
CULTURE RESULTS: SIGNIFICANT CHANGE UP
SPECIMEN SOURCE: SIGNIFICANT CHANGE UP
SPECIMEN SOURCE: SIGNIFICANT CHANGE UP

## 2024-01-07 RX ORDER — DIPHENHYDRAMINE HCL 50 MG
25 CAPSULE ORAL ONCE
Refills: 0 | Status: COMPLETED | OUTPATIENT
Start: 2024-01-07 | End: 2024-01-07

## 2024-01-07 RX ADMIN — Medication 650 MILLIGRAM(S): at 22:17

## 2024-01-07 RX ADMIN — ISOSORBIDE MONONITRATE 30 MILLIGRAM(S): 60 TABLET, EXTENDED RELEASE ORAL at 11:09

## 2024-01-07 RX ADMIN — FINASTERIDE 5 MILLIGRAM(S): 5 TABLET, FILM COATED ORAL at 11:10

## 2024-01-07 RX ADMIN — TAMSULOSIN HYDROCHLORIDE 0.4 MILLIGRAM(S): 0.4 CAPSULE ORAL at 22:18

## 2024-01-07 RX ADMIN — Medication 100 MILLIGRAM(S): at 06:40

## 2024-01-07 RX ADMIN — INSULIN GLARGINE 8 UNIT(S): 100 INJECTION, SOLUTION SUBCUTANEOUS at 00:55

## 2024-01-07 RX ADMIN — Medication 25 MILLIGRAM(S): at 00:35

## 2024-01-07 RX ADMIN — TAMSULOSIN HYDROCHLORIDE 0.4 MILLIGRAM(S): 0.4 CAPSULE ORAL at 00:35

## 2024-01-07 RX ADMIN — Medication 25 MILLIGRAM(S): at 15:42

## 2024-01-07 RX ADMIN — CLOPIDOGREL BISULFATE 75 MILLIGRAM(S): 75 TABLET, FILM COATED ORAL at 11:10

## 2024-01-07 RX ADMIN — Medication 25 MILLIGRAM(S): at 06:40

## 2024-01-07 RX ADMIN — DIVALPROEX SODIUM 500 MILLIGRAM(S): 500 TABLET, DELAYED RELEASE ORAL at 00:35

## 2024-01-07 RX ADMIN — DIVALPROEX SODIUM 500 MILLIGRAM(S): 500 TABLET, DELAYED RELEASE ORAL at 18:26

## 2024-01-07 RX ADMIN — Medication 100 MILLIGRAM(S): at 22:19

## 2024-01-07 RX ADMIN — DIVALPROEX SODIUM 500 MILLIGRAM(S): 500 TABLET, DELAYED RELEASE ORAL at 06:40

## 2024-01-07 RX ADMIN — Medication 650 MILLIGRAM(S): at 00:35

## 2024-01-07 RX ADMIN — Medication 25 MILLIGRAM(S): at 03:35

## 2024-01-07 RX ADMIN — Medication 0.5 MILLIGRAM(S): at 00:35

## 2024-01-07 RX ADMIN — INSULIN GLARGINE 8 UNIT(S): 100 INJECTION, SOLUTION SUBCUTANEOUS at 22:19

## 2024-01-07 RX ADMIN — Medication 650 MILLIGRAM(S): at 06:40

## 2024-01-07 RX ADMIN — Medication 1: at 11:09

## 2024-01-07 RX ADMIN — Medication 100 MILLIGRAM(S): at 00:35

## 2024-01-07 RX ADMIN — Medication 25 MILLIGRAM(S): at 22:17

## 2024-01-07 RX ADMIN — Medication 650 MILLIGRAM(S): at 15:42

## 2024-01-07 NOTE — PROGRESS NOTE ADULT - ASSESSMENT
79 yo male h/o schizophrenia, dementia, agitation, ckd, dm, htn, presenting to ER with agitation    schizophrenia/dementia/agitation  psy following  plan for inpt psy admission 2PC  awaiting bed  psy meds as per psy    ckd  stable   creat at baseline  pt makes urine  no need for urgent HD  to f/u with outpt nephrologist as noted in nephrology consult note from 12/27/23  renal f/u noted    HTN  resume outpt bp meds    dm  lantus and iss   monitor fs    d/w ER staff  pt awaiting bed at inpt psy facility
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
## 2024-01-07 NOTE — PROGRESS NOTE BEHAVIORAL HEALTH - NSBHCHARTREVIEWLAB_PSY_A_CORE FT
Urinalysis Basic - ( 2024 04:30 )    Color: Yellow / Appearance: Clear / S.014 / pH: x  Gluc: x / Ketone: Negative mg/dL  / Bili: Negative / Urobili: 0.2 mg/dL   Blood: x / Protein: 100 mg/dL / Nitrite: Negative   Leuk Esterase: Negative / RBC: 0 /HPF / WBC 0 /HPF   Sq Epi: x / Non Sq Epi: 0 /HPF / Bacteria: Negative /HPF
9.4    4.57  )-----------( 125      ( 2024 07:25 )             30.7         144  |  115<H>  |  90<H>  ----------------------------<  50<LL>  4.6   |  14<L>  |  5.01<H>    Ca    8.2<L>      2024 07:25  Mg     1.9         TPro  6.5  /  Alb  3.2<L>  /  TBili  0.2  /  DBili  x   /  AST  16  /  ALT  7<L>  /  AlkPhos  96      Urinalysis Basic - ( 2024 04:30 )    Color: Yellow / Appearance: Clear / S.014 / pH: x  Gluc: x / Ketone: Negative mg/dL  / Bili: Negative / Urobili: 0.2 mg/dL   Blood: x / Protein: 100 mg/dL / Nitrite: Negative   Leuk Esterase: Negative / RBC: 0 /HPF / WBC 0 /HPF   Sq Epi: x / Non Sq Epi: 0 /HPF / Bacteria: Negative /HPF

## 2024-01-07 NOTE — ED ADULT NURSE REASSESSMENT NOTE - NSFALLRISKINTERV_ED_ALL_ED
Communicate fall risk and risk factors to all staff, patient, and family/Provide visual cue: yellow wristband, yellow gown, etc/Reinforce activity limits and safety measures with patient and family/Use of alarms - bed, stretcher, chair and/or video monitoring/Call bell, personal items and telephone in reach/Instruct patient to call for assistance before getting out of bed/chair/stretcher/Non-slip footwear applied when patient is off stretcher/[place] to call system/Physically safe environment - no spills, clutter or unnecessary equipment/Purposeful Proactive Rounding/Room/bathroom lighting operational, light cord in reach Communicate fall risk and risk factors to all staff, patient, and family/Provide visual cue: yellow wristband, yellow gown, etc/Reinforce activity limits and safety measures with patient and family/Use of alarms - bed, stretcher, chair and/or video monitoring/Call bell, personal items and telephone in reach/Instruct patient to call for assistance before getting out of bed/chair/stretcher/Non-slip footwear applied when patient is off stretcher/[place] to call system/Physically safe environment - no spills, clutter or unnecessary equipment/Purposeful Proactive Rounding/Room/bathroom lighting operational, light cord in reach

## 2024-01-07 NOTE — PROGRESS NOTE BEHAVIORAL HEALTH - NSBHCONSULTMEDS_PSY_A_CORE FT
Depakote ER 500mg p.o. BID, Haldol 2mg p.o. at bedtime    PRN: Haldol 2-5mg/Ativan 1-2mg IV Q6H PRN
Depakote ER 500mg p.o. BID, Haldol 2mg p.o. at bedtime    PRN: Haldol 2-5mg/Ativan 1-2mg IV Q6H PRN

## 2024-01-07 NOTE — PROGRESS NOTE BEHAVIORAL HEALTH - NSBHFUPINTERVALHXFT_PSY_A_CORE
Pt seen for f/u, awaiting 2PC placement to in psychiatry.  Pt pleasant, talkative, pressured, states he is fine, denies SI/HI, tangential about prior  service, states he would hear "machine guns".  Denies depression.  Sleep is poor, states his thoughts are racing.
Pt irritable this morning, danae in responses to questions and denies all sx asked, states he wants to be left alone to eat his breakfast, does not want a CTH.  Per staff, pt fell last night.  Denies SI/HI.
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
## 2024-01-07 NOTE — PROGRESS NOTE BEHAVIORAL HEALTH - NSBHCHARTREVIEWINVESTIGATE_PSY_A_CORE FT
< from: 12 Lead ECG (01.04.24 @ 23:51) >      Ventricular Rate 69 BPM    Atrial Rate 69 BPM    P-R Interval 160 ms    QRS Duration 76 ms    Q-T Interval 398 ms    QTC Calculation(Bazett) 426 ms    P Axis 63 degrees    R Axis 72 degrees    T Axis 52 degrees    Diagnosis Line BASELINE ARTIFACT  NORMAL SINUS RHYTHM  NORMAL ECG  WHEN COMPARED WITH ECG OF 04-JAN-2024 12:45, (UNCONFIRMED)  NO SIGNIFICANT CHANGE WAS FOUND  Confirmed by MD iMnh, Mukesh ([id number]) on 1/5/2024 7:37:24 PM    < end of copied text > < from: 12 Lead ECG (01.04.24 @ 23:51) >      Ventricular Rate 69 BPM    Atrial Rate 69 BPM    P-R Interval 160 ms    QRS Duration 76 ms    Q-T Interval 398 ms    QTC Calculation(Bazett) 426 ms    P Axis 63 degrees    R Axis 72 degrees    T Axis 52 degrees    Diagnosis Line BASELINE ARTIFACT  NORMAL SINUS RHYTHM  NORMAL ECG  WHEN COMPARED WITH ECG OF 04-JAN-2024 12:45, (UNCONFIRMED)  NO SIGNIFICANT CHANGE WAS FOUND  Confirmed by MD Minh, Mukesh ([id number]) on 1/5/2024 7:37:24 PM    < end of copied text >

## 2024-01-08 DIAGNOSIS — F20.9 SCHIZOPHRENIA, UNSPECIFIED: ICD-10-CM

## 2024-01-08 LAB
ANION GAP SERPL CALC-SCNC: 12 MMOL/L — SIGNIFICANT CHANGE UP (ref 5–17)
ANION GAP SERPL CALC-SCNC: 12 MMOL/L — SIGNIFICANT CHANGE UP (ref 5–17)
BASOPHILS # BLD AUTO: 0.02 K/UL — SIGNIFICANT CHANGE UP (ref 0–0.2)
BASOPHILS # BLD AUTO: 0.02 K/UL — SIGNIFICANT CHANGE UP (ref 0–0.2)
BASOPHILS NFR BLD AUTO: 0.3 % — SIGNIFICANT CHANGE UP (ref 0–2)
BASOPHILS NFR BLD AUTO: 0.3 % — SIGNIFICANT CHANGE UP (ref 0–2)
BUN SERPL-MCNC: 100 MG/DL — HIGH (ref 7–23)
BUN SERPL-MCNC: 100 MG/DL — HIGH (ref 7–23)
CALCIUM SERPL-MCNC: 8.2 MG/DL — LOW (ref 8.4–10.5)
CALCIUM SERPL-MCNC: 8.2 MG/DL — LOW (ref 8.4–10.5)
CHLORIDE SERPL-SCNC: 105 MMOL/L — SIGNIFICANT CHANGE UP (ref 96–108)
CHLORIDE SERPL-SCNC: 105 MMOL/L — SIGNIFICANT CHANGE UP (ref 96–108)
CO2 SERPL-SCNC: 16 MMOL/L — LOW (ref 22–31)
CO2 SERPL-SCNC: 16 MMOL/L — LOW (ref 22–31)
CREAT SERPL-MCNC: 5.55 MG/DL — HIGH (ref 0.5–1.3)
CREAT SERPL-MCNC: 5.55 MG/DL — HIGH (ref 0.5–1.3)
EGFR: 10 ML/MIN/1.73M2 — LOW
EGFR: 10 ML/MIN/1.73M2 — LOW
EOSINOPHIL # BLD AUTO: 0.11 K/UL — SIGNIFICANT CHANGE UP (ref 0–0.5)
EOSINOPHIL # BLD AUTO: 0.11 K/UL — SIGNIFICANT CHANGE UP (ref 0–0.5)
EOSINOPHIL NFR BLD AUTO: 1.7 % — SIGNIFICANT CHANGE UP (ref 0–6)
EOSINOPHIL NFR BLD AUTO: 1.7 % — SIGNIFICANT CHANGE UP (ref 0–6)
GLUCOSE BLDC GLUCOMTR-MCNC: 132 MG/DL — HIGH (ref 70–99)
GLUCOSE BLDC GLUCOMTR-MCNC: 132 MG/DL — HIGH (ref 70–99)
GLUCOSE SERPL-MCNC: 101 MG/DL — HIGH (ref 70–99)
GLUCOSE SERPL-MCNC: 101 MG/DL — HIGH (ref 70–99)
HCT VFR BLD CALC: 23.2 % — LOW (ref 39–50)
HCT VFR BLD CALC: 23.2 % — LOW (ref 39–50)
HGB BLD-MCNC: 7.7 G/DL — LOW (ref 13–17)
HGB BLD-MCNC: 7.7 G/DL — LOW (ref 13–17)
IMM GRANULOCYTES NFR BLD AUTO: 0.5 % — SIGNIFICANT CHANGE UP (ref 0–0.9)
IMM GRANULOCYTES NFR BLD AUTO: 0.5 % — SIGNIFICANT CHANGE UP (ref 0–0.9)
LYMPHOCYTES # BLD AUTO: 1.39 K/UL — SIGNIFICANT CHANGE UP (ref 1–3.3)
LYMPHOCYTES # BLD AUTO: 1.39 K/UL — SIGNIFICANT CHANGE UP (ref 1–3.3)
LYMPHOCYTES # BLD AUTO: 21.2 % — SIGNIFICANT CHANGE UP (ref 13–44)
LYMPHOCYTES # BLD AUTO: 21.2 % — SIGNIFICANT CHANGE UP (ref 13–44)
MAGNESIUM SERPL-MCNC: 1.7 MG/DL — SIGNIFICANT CHANGE UP (ref 1.6–2.6)
MAGNESIUM SERPL-MCNC: 1.7 MG/DL — SIGNIFICANT CHANGE UP (ref 1.6–2.6)
MCHC RBC-ENTMCNC: 28.8 PG — SIGNIFICANT CHANGE UP (ref 27–34)
MCHC RBC-ENTMCNC: 28.8 PG — SIGNIFICANT CHANGE UP (ref 27–34)
MCHC RBC-ENTMCNC: 33.2 GM/DL — SIGNIFICANT CHANGE UP (ref 32–36)
MCHC RBC-ENTMCNC: 33.2 GM/DL — SIGNIFICANT CHANGE UP (ref 32–36)
MCV RBC AUTO: 86.9 FL — SIGNIFICANT CHANGE UP (ref 80–100)
MCV RBC AUTO: 86.9 FL — SIGNIFICANT CHANGE UP (ref 80–100)
MONOCYTES # BLD AUTO: 0.69 K/UL — SIGNIFICANT CHANGE UP (ref 0–0.9)
MONOCYTES # BLD AUTO: 0.69 K/UL — SIGNIFICANT CHANGE UP (ref 0–0.9)
MONOCYTES NFR BLD AUTO: 10.5 % — SIGNIFICANT CHANGE UP (ref 2–14)
MONOCYTES NFR BLD AUTO: 10.5 % — SIGNIFICANT CHANGE UP (ref 2–14)
NEUTROPHILS # BLD AUTO: 4.32 K/UL — SIGNIFICANT CHANGE UP (ref 1.8–7.4)
NEUTROPHILS # BLD AUTO: 4.32 K/UL — SIGNIFICANT CHANGE UP (ref 1.8–7.4)
NEUTROPHILS NFR BLD AUTO: 65.8 % — SIGNIFICANT CHANGE UP (ref 43–77)
NEUTROPHILS NFR BLD AUTO: 65.8 % — SIGNIFICANT CHANGE UP (ref 43–77)
NRBC # BLD: 0 /100 WBCS — SIGNIFICANT CHANGE UP (ref 0–0)
NRBC # BLD: 0 /100 WBCS — SIGNIFICANT CHANGE UP (ref 0–0)
PHOSPHATE SERPL-MCNC: 4.6 MG/DL — HIGH (ref 2.5–4.5)
PHOSPHATE SERPL-MCNC: 4.6 MG/DL — HIGH (ref 2.5–4.5)
PLATELET # BLD AUTO: 110 K/UL — LOW (ref 150–400)
PLATELET # BLD AUTO: 110 K/UL — LOW (ref 150–400)
POTASSIUM SERPL-MCNC: 5 MMOL/L — SIGNIFICANT CHANGE UP (ref 3.5–5.3)
POTASSIUM SERPL-MCNC: 5 MMOL/L — SIGNIFICANT CHANGE UP (ref 3.5–5.3)
POTASSIUM SERPL-SCNC: 5 MMOL/L — SIGNIFICANT CHANGE UP (ref 3.5–5.3)
POTASSIUM SERPL-SCNC: 5 MMOL/L — SIGNIFICANT CHANGE UP (ref 3.5–5.3)
RBC # BLD: 2.67 M/UL — LOW (ref 4.2–5.8)
RBC # BLD: 2.67 M/UL — LOW (ref 4.2–5.8)
RBC # FLD: 13.9 % — SIGNIFICANT CHANGE UP (ref 10.3–14.5)
RBC # FLD: 13.9 % — SIGNIFICANT CHANGE UP (ref 10.3–14.5)
SODIUM SERPL-SCNC: 133 MMOL/L — LOW (ref 135–145)
SODIUM SERPL-SCNC: 133 MMOL/L — LOW (ref 135–145)
WBC # BLD: 6.56 K/UL — SIGNIFICANT CHANGE UP (ref 3.8–10.5)
WBC # BLD: 6.56 K/UL — SIGNIFICANT CHANGE UP (ref 3.8–10.5)
WBC # FLD AUTO: 6.56 K/UL — SIGNIFICANT CHANGE UP (ref 3.8–10.5)
WBC # FLD AUTO: 6.56 K/UL — SIGNIFICANT CHANGE UP (ref 3.8–10.5)

## 2024-01-08 PROCEDURE — 99232 SBSQ HOSP IP/OBS MODERATE 35: CPT

## 2024-01-08 RX ORDER — HALOPERIDOL DECANOATE 100 MG/ML
2 INJECTION INTRAMUSCULAR AT BEDTIME
Refills: 0 | Status: DISCONTINUED | OUTPATIENT
Start: 2024-01-08 | End: 2024-01-07

## 2024-01-08 RX ADMIN — Medication 650 MILLIGRAM(S): at 22:12

## 2024-01-08 RX ADMIN — CLOPIDOGREL BISULFATE 75 MILLIGRAM(S): 75 TABLET, FILM COATED ORAL at 12:17

## 2024-01-08 RX ADMIN — Medication 650 MILLIGRAM(S): at 06:01

## 2024-01-08 RX ADMIN — DIVALPROEX SODIUM 500 MILLIGRAM(S): 500 TABLET, DELAYED RELEASE ORAL at 06:01

## 2024-01-08 RX ADMIN — DIVALPROEX SODIUM 500 MILLIGRAM(S): 500 TABLET, DELAYED RELEASE ORAL at 16:46

## 2024-01-08 RX ADMIN — TAMSULOSIN HYDROCHLORIDE 0.4 MILLIGRAM(S): 0.4 CAPSULE ORAL at 22:12

## 2024-01-08 RX ADMIN — Medication 1: at 11:39

## 2024-01-08 RX ADMIN — Medication 25 MILLIGRAM(S): at 22:13

## 2024-01-08 RX ADMIN — Medication 25 MILLIGRAM(S): at 06:01

## 2024-01-08 RX ADMIN — Medication 650 MILLIGRAM(S): at 16:46

## 2024-01-08 RX ADMIN — HALOPERIDOL DECANOATE 2 MILLIGRAM(S): 100 INJECTION INTRAMUSCULAR at 00:26

## 2024-01-08 RX ADMIN — Medication 25 MILLIGRAM(S): at 16:46

## 2024-01-08 RX ADMIN — HALOPERIDOL DECANOATE 2 MILLIGRAM(S): 100 INJECTION INTRAMUSCULAR at 22:12

## 2024-01-08 RX ADMIN — ISOSORBIDE MONONITRATE 30 MILLIGRAM(S): 60 TABLET, EXTENDED RELEASE ORAL at 12:18

## 2024-01-08 RX ADMIN — HALOPERIDOL DECANOATE 2 MILLIGRAM(S): 100 INJECTION INTRAMUSCULAR at 07:15

## 2024-01-08 RX ADMIN — INSULIN GLARGINE 8 UNIT(S): 100 INJECTION, SOLUTION SUBCUTANEOUS at 22:09

## 2024-01-08 RX ADMIN — Medication 100 MILLIGRAM(S): at 22:12

## 2024-01-08 RX ADMIN — Medication 2 MILLIGRAM(S): at 08:12

## 2024-01-08 RX ADMIN — FINASTERIDE 5 MILLIGRAM(S): 5 TABLET, FILM COATED ORAL at 12:18

## 2024-01-08 RX ADMIN — Medication 100 MILLIGRAM(S): at 06:01

## 2024-01-08 NOTE — BH CONSULTATION LIAISON PROGRESS NOTE - NSBHCHARTREVIEWINVESTIGATE_PSY_A_CORE FT
< from: 12 Lead ECG (01.04.24 @ 23:51) >      Ventricular Rate 69 BPM    Atrial Rate 69 BPM    P-R Interval 160 ms    QRS Duration 76 ms    Q-T Interval 398 ms    QTC Calculation(Bazett) 426 ms    P Axis 63 degrees    R Axis 72 degrees    T Axis 52 degrees    Diagnosis Line BASELINE ARTIFACT  NORMAL SINUS RHYTHM  NORMAL ECG  WHEN COMPARED WITH ECG OF 04-JAN-2024 12:45, (UNCONFIRMED)  NO SIGNIFICANT CHANGE WAS FOUND  Confirmed by MD Minh, Mukesh ([id number]) on 1/5/2024 7:37:24 PM    < end of copied text > < from: 12 Lead ECG (01.04.24 @ 23:51) >      Ventricular Rate 69 BPM    Atrial Rate 69 BPM    P-R Interval 160 ms    QRS Duration 76 ms    Q-T Interval 398 ms    QTC Calculation(Bazett) 426 ms    P Axis 63 degrees    R Axis 72 degrees    T Axis 52 degrees    Diagnosis Line BASELINE ARTIFACT  NORMAL SINUS RHYTHM  NORMAL ECG  WHEN COMPARED WITH ECG OF 04-JAN-2024 12:45, (UNCONFIRMED)  NO SIGNIFICANT CHANGE WAS FOUND  Confirmed by MD Minh, Mukesh ([id number]) on 1/5/2024 7:37:24 PM    < end of copied text >
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
## 2024-01-08 NOTE — ED BEHAVIORAL HEALTH NOTE - BEHAVIORAL HEALTH NOTE
Per RN patient remains wanded and in private room with [facility]:1[facility]. No SI/HI/AH/VH elicited; RN notes patient became agitated and was requesting to make a call, required 2mg Haldol IM. Patient observed to be awake in hospital bed at this time. Patient unaccompanied by social supports. Per RN patient remains wanded and in private room with 1:1; [facility]. No SI/HI/AH/VH elicited; RN notes patient became agitated and was requesting to make a call, required 2mg Haldol IM. Patient observed to be awake in hospital bed at this time. Patient unaccompanied by social supports.

## 2024-01-08 NOTE — H&P ADULT - NSHPPHYSICALEXAM_GEN_ALL_CORE
Vital Signs Last 24 Hrs  T(C): 36.4 (08 Jan 2024 11:19), Max: 36.8 (08 Jan 2024 06:06)  T(F): 97.5 (08 Jan 2024 11:19), Max: 98.2 (08 Jan 2024 06:06)  HR: 88 (08 Jan 2024 11:19) (88 - 93)  BP: 168/74 (08 Jan 2024 11:19) (160/80 - 168/74)  BP(mean): --  RR: 18 (08 Jan 2024 11:19) (18 - 20)  SpO2: 100% (08 Jan 2024 11:19) (99% - 100%)    Parameters below as of 08 Jan 2024 11:19  Patient On (Oxygen Delivery Method): room air        pt refused physical exam

## 2024-01-08 NOTE — BH CONSULTATION LIAISON PROGRESS NOTE - NSBHFUPINTERVALHXFT_PSY_A_CORE
Pt. seen and evaluated, staff consulted, chart, labs, meds reviewed. He was irritable this morning, wanted to be left alone to eat his breakfast, refusing care. In the afternoon, patient is more delirious, confused, disoriented. Denies SI/HI.    At this time, a decision has been made to admit the patient to the Medical floor to stabilize him for a psychiatric admission.
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
## 2024-01-08 NOTE — H&P ADULT - ASSESSMENT
79 yo male h/o schizophrenia, dementia, agitation, ckd, dm, htn, presenting to ER with agitation    schizophrenia/dementia/agitation  psy following  plan for inpt psy admission 2PC  awaiting bed  meds as per psy    ckd  stable   creat at baseline  pt makes urine  no need for urgent HD  to f/u with outpt nephrologist as noted in nephrology consult note from 12/27/23  renal f/u noted  f/u today's bmp    HTN  resume outpt bp meds    dm  lantus and iss   monitor fs    d/w ER staff  pt awaiting bed at inpt psy facility     pt medically stable for discharge to inpt psy facility          Advanced care planning was discussed with patient and family.  Advanced care planning forms were reviewed and discussed as appropriate.  Differential diagnosis and plan of care discussed with patient after the evaluation.   Pain assessed and judicious use of narcotics when appropriate was discussed.  Importance of Fall prevention discussed.  Counseling on Smoking and Alcohol cessation was offered when appropriate.  Counseling on Diet, exercise, and medication compliance was done.       Approx 75 minutes spent. 79 yo male h/o schizophrenia, dementia, agitation, ckd, dm, htn, presenting to ER with agitation    schizophrenia/dementia/agitation  psy following  plan for inpt psy admission 2PC  awaiting bed  meds as per psy    ckd  stable   creat at baseline  pt makes urine  no need for urgent HD  to f/u with outpt nephrologist as noted in nephrology consult note from 12/27/23  renal f/u noted  f/u today's bmp    anemia  chronic  due to ckd  iron supp  monitor     HTN  resume outpt bp meds    dm  lantus and iss   monitor fs    d/w ER staff  pt awaiting bed at inpt psy facility     pt medically stable for discharge to inpt psy facility          Advanced care planning was discussed with patient and family.  Advanced care planning forms were reviewed and discussed as appropriate.  Differential diagnosis and plan of care discussed with patient after the evaluation.   Pain assessed and judicious use of narcotics when appropriate was discussed.  Importance of Fall prevention discussed.  Counseling on Smoking and Alcohol cessation was offered when appropriate.  Counseling on Diet, exercise, and medication compliance was done.       Approx 75 minutes spent. 77 yo male h/o schizophrenia, dementia, agitation, ckd, dm, htn, presenting to ER with agitation    schizophrenia/dementia/agitation  psy following  plan for inpt psy admission 2PC  awaiting bed  meds as per psy    ckd  stable   creat at baseline  pt makes urine  no need for urgent HD  to f/u with outpt nephrologist as noted in nephrology consult note from 12/27/23  renal f/u noted  f/u today's bmp    anemia  chronic  due to ckd  iron supp  monitor     HTN  resume outpt bp meds    dm  lantus and iss   monitor fs    d/w ER staff  pt awaiting bed at inpt psy facility     pt medically stable for discharge to inpt psy facility          Advanced care planning was discussed with patient and family.  Advanced care planning forms were reviewed and discussed as appropriate.  Differential diagnosis and plan of care discussed with patient after the evaluation.   Pain assessed and judicious use of narcotics when appropriate was discussed.  Importance of Fall prevention discussed.  Counseling on Smoking and Alcohol cessation was offered when appropriate.  Counseling on Diet, exercise, and medication compliance was done.       Approx 75 minutes spent.

## 2024-01-09 LAB
GLUCOSE BLDC GLUCOMTR-MCNC: 183 MG/DL — HIGH (ref 70–99)
GLUCOSE BLDC GLUCOMTR-MCNC: 183 MG/DL — HIGH (ref 70–99)
GLUCOSE BLDC GLUCOMTR-MCNC: 253 MG/DL — HIGH (ref 70–99)
GLUCOSE BLDC GLUCOMTR-MCNC: 253 MG/DL — HIGH (ref 70–99)
GLUCOSE BLDC GLUCOMTR-MCNC: 75 MG/DL — SIGNIFICANT CHANGE UP (ref 70–99)
GLUCOSE BLDC GLUCOMTR-MCNC: 75 MG/DL — SIGNIFICANT CHANGE UP (ref 70–99)
GLUCOSE BLDC GLUCOMTR-MCNC: 98 MG/DL — SIGNIFICANT CHANGE UP (ref 70–99)
GLUCOSE BLDC GLUCOMTR-MCNC: 98 MG/DL — SIGNIFICANT CHANGE UP (ref 70–99)

## 2024-01-09 PROCEDURE — 99232 SBSQ HOSP IP/OBS MODERATE 35: CPT

## 2024-01-09 RX ORDER — HALOPERIDOL DECANOATE 100 MG/ML
2 INJECTION INTRAMUSCULAR EVERY 6 HOURS
Refills: 0 | Status: DISCONTINUED | OUTPATIENT
Start: 2024-01-09 | End: 2024-01-07

## 2024-01-09 RX ADMIN — DIVALPROEX SODIUM 500 MILLIGRAM(S): 500 TABLET, DELAYED RELEASE ORAL at 05:09

## 2024-01-09 RX ADMIN — Medication 650 MILLIGRAM(S): at 15:33

## 2024-01-09 RX ADMIN — HALOPERIDOL DECANOATE 2 MILLIGRAM(S): 100 INJECTION INTRAMUSCULAR at 09:44

## 2024-01-09 RX ADMIN — Medication 25 MILLIGRAM(S): at 22:07

## 2024-01-09 RX ADMIN — TAMSULOSIN HYDROCHLORIDE 0.4 MILLIGRAM(S): 0.4 CAPSULE ORAL at 22:06

## 2024-01-09 RX ADMIN — Medication 3: at 18:27

## 2024-01-09 RX ADMIN — Medication 650 MILLIGRAM(S): at 05:09

## 2024-01-09 RX ADMIN — CLOPIDOGREL BISULFATE 75 MILLIGRAM(S): 75 TABLET, FILM COATED ORAL at 15:33

## 2024-01-09 RX ADMIN — Medication 25 MILLIGRAM(S): at 05:09

## 2024-01-09 RX ADMIN — INSULIN GLARGINE 8 UNIT(S): 100 INJECTION, SOLUTION SUBCUTANEOUS at 22:22

## 2024-01-09 RX ADMIN — HALOPERIDOL DECANOATE 2 MILLIGRAM(S): 100 INJECTION INTRAMUSCULAR at 17:10

## 2024-01-09 RX ADMIN — DIVALPROEX SODIUM 500 MILLIGRAM(S): 500 TABLET, DELAYED RELEASE ORAL at 17:09

## 2024-01-09 RX ADMIN — Medication 650 MILLIGRAM(S): at 22:25

## 2024-01-09 RX ADMIN — ISOSORBIDE MONONITRATE 30 MILLIGRAM(S): 60 TABLET, EXTENDED RELEASE ORAL at 15:33

## 2024-01-09 RX ADMIN — Medication 100 MILLIGRAM(S): at 22:07

## 2024-01-09 RX ADMIN — Medication 25 MILLIGRAM(S): at 15:33

## 2024-01-09 RX ADMIN — FINASTERIDE 5 MILLIGRAM(S): 5 TABLET, FILM COATED ORAL at 15:33

## 2024-01-09 RX ADMIN — HALOPERIDOL DECANOATE 2 MILLIGRAM(S): 100 INJECTION INTRAMUSCULAR at 22:24

## 2024-01-09 RX ADMIN — Medication 100 MILLIGRAM(S): at 05:09
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
## 2024-01-09 NOTE — BH CONSULTATION LIAISON PROGRESS NOTE - NSBHCHARTREVIEWINVESTIGATE_PSY_A_CORE FT
< from: 12 Lead ECG (01.04.24 @ 23:51) >      Ventricular Rate 69 BPM    Atrial Rate 69 BPM    P-R Interval 160 ms    QRS Duration 76 ms    Q-T Interval 398 ms    QTC Calculation(Bazett) 426 ms    P Axis 63 degrees    R Axis 72 degrees    T Axis 52 degrees    Diagnosis Line BASELINE ARTIFACT  NORMAL SINUS RHYTHM  NORMAL ECG  WHEN COMPARED WITH ECG OF 04-JAN-2024 12:45, (UNCONFIRMED)  NO SIGNIFICANT CHANGE WAS FOUND  Confirmed by MD Minh, Mukesh ([id number]) on 1/5/2024 7:37:24 PM    < end of copied text > < from: 12 Lead ECG (01.04.24 @ 23:51) >      Ventricular Rate 69 BPM    Atrial Rate 69 BPM    P-R Interval 160 ms    QRS Duration 76 ms    Q-T Interval 398 ms    QTC Calculation(Bazett) 426 ms    P Axis 63 degrees    R Axis 72 degrees    T Axis 52 degrees    Diagnosis Line BASELINE ARTIFACT  NORMAL SINUS RHYTHM  NORMAL ECG  WHEN COMPARED WITH ECG OF 04-JAN-2024 12:45, (UNCONFIRMED)  NO SIGNIFICANT CHANGE WAS FOUND  Confirmed by MD Minh, Mukesh ([id number]) on 1/5/2024 7:37:24 PM    < end of copied text >
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
## 2024-01-09 NOTE — PATIENT PROFILE ADULT - FUNCTIONAL ASSESSMENT - BASIC MOBILITY 6.
2-calculated by average/Not able to assess (calculate score using [facility] averaging method)  2-calculated by average/Not able to assess (calculate score using [facility] averaging method)

## 2024-01-09 NOTE — PATIENT PROFILE ADULT - FALL HARM RISK - HARM RISK INTERVENTIONS
Assistance with ambulation/Assistance OOB with selected safe patient handling equipment/Communicate Risk of Fall with Harm to all staff/Discuss with provider need for PT consult/Monitor for mental status changes/Monitor gait and stability/Move patient closer to nurses' station/Provide patient with walking aids - walker, cane, crutches/Reinforce activity limits and safety measures with patient and family/Reorient to person, place and time as needed/Tailored Fall Risk Interventions/Toileting schedule using arm’s reach rule for commode and bathroom/Use of alarms - bed, chair and/or voice tab/Visual Cue: Yellow wristband and red socks/Bed in lowest position, wheels locked, appropriate side rails in place/Call bell, personal items and telephone in reach/Instruct patient to call for assistance before getting out of bed or chair/Non-slip footwear when patient is out of bed/[place] to call system/Physically safe environment - no spills, clutter or unnecessary equipment/Purposeful Proactive Rounding/Room/bathroom lighting operational, light cord in reach Assistance with ambulation/Assistance OOB with selected safe patient handling equipment/Communicate Risk of Fall with Harm to all staff/Discuss with provider need for PT consult/Monitor for mental status changes/Monitor gait and stability/Move patient closer to nurses' station/Provide patient with walking aids - walker, cane, crutches/Reinforce activity limits and safety measures with patient and family/Reorient to person, place and time as needed/Tailored Fall Risk Interventions/Toileting schedule using arm’s reach rule for commode and bathroom/Use of alarms - bed, chair and/or voice tab/Visual Cue: Yellow wristband and red socks/Bed in lowest position, wheels locked, appropriate side rails in place/Call bell, personal items and telephone in reach/Instruct patient to call for assistance before getting out of bed or chair/Non-slip footwear when patient is out of bed/[place] to call system/Physically safe environment - no spills, clutter or unnecessary equipment/Purposeful Proactive Rounding/Room/bathroom lighting operational, light cord in reach

## 2024-01-09 NOTE — PROGRESS NOTE ADULT - ASSESSMENT
79 yo male h/o schizophrenia, dementia, agitation, ckd, dm, htn, presenting to ER with agitation    schizophrenia/dementia/agitation  psy following  plan for inpt psy admission 2PC  awaiting bed  psy meds as per psy    ckd  stable   creat at baseline  pt makes urine  no need for urgent HD  to f/u with outpt nephrologist as noted in nephrology consult note from 12/27/23  renal f/u noted    HTN  resume outpt bp meds    anemia  chronic  due to ckd  iron supp  monitor     dm  lantus and iss   monitor fs    pt medically stable for dc to inpt psy facility   pt awaiting bed at inpt psy facility       Advanced care planning was discussed with patient and family.  Advanced care planning forms were reviewed and discussed as appropriate.  Differential diagnosis and plan of care discussed with patient after the evaluation.   Pain assessed and judicious use of narcotics when appropriate was discussed.  Importance of Fall prevention discussed.  Counseling on Smoking and Alcohol cessation was offered when appropriate.  Counseling on Diet, exercise, and medication compliance was done.       Approx 75 minutes spent.

## 2024-01-09 NOTE — BH CONSULTATION LIAISON PROGRESS NOTE - NSBHFUPINTERVALHXFT_PSY_A_CORE
Pt. seen and evaluated, staff consulted, chart, labs, meds reviewed. He remains intermittently irritable, wants to be left alone to eat, refusing care, but also wanting to walk. Yesterday afternoon, patient was delirious, confused, disoriented. Today, an improvement in clarity of sensorium noted. Denies SI/HI.    As per staff, patient intermittently refusing meds.

## 2024-01-10 LAB
GLUCOSE BLDC GLUCOMTR-MCNC: 113 MG/DL — HIGH (ref 70–99)
GLUCOSE BLDC GLUCOMTR-MCNC: 113 MG/DL — HIGH (ref 70–99)
GLUCOSE BLDC GLUCOMTR-MCNC: 117 MG/DL — HIGH (ref 70–99)
GLUCOSE BLDC GLUCOMTR-MCNC: 117 MG/DL — HIGH (ref 70–99)
GLUCOSE BLDC GLUCOMTR-MCNC: 164 MG/DL — HIGH (ref 70–99)
GLUCOSE BLDC GLUCOMTR-MCNC: 164 MG/DL — HIGH (ref 70–99)
GLUCOSE BLDC GLUCOMTR-MCNC: 168 MG/DL — HIGH (ref 70–99)
GLUCOSE BLDC GLUCOMTR-MCNC: 168 MG/DL — HIGH (ref 70–99)
SARS-COV-2 RNA SPEC QL NAA+PROBE: SIGNIFICANT CHANGE UP
SARS-COV-2 RNA SPEC QL NAA+PROBE: SIGNIFICANT CHANGE UP

## 2024-01-10 PROCEDURE — 99232 SBSQ HOSP IP/OBS MODERATE 35: CPT

## 2024-01-10 PROCEDURE — 99232 SBSQ HOSP IP/OBS MODERATE 35: CPT | Mod: GC

## 2024-01-10 RX ADMIN — FINASTERIDE 5 MILLIGRAM(S): 5 TABLET, FILM COATED ORAL at 12:54

## 2024-01-10 RX ADMIN — Medication 650 MILLIGRAM(S): at 12:55

## 2024-01-10 RX ADMIN — Medication 25 MILLIGRAM(S): at 12:55

## 2024-01-10 RX ADMIN — CLOPIDOGREL BISULFATE 75 MILLIGRAM(S): 75 TABLET, FILM COATED ORAL at 12:55

## 2024-01-10 RX ADMIN — TAMSULOSIN HYDROCHLORIDE 0.4 MILLIGRAM(S): 0.4 CAPSULE ORAL at 22:18

## 2024-01-10 RX ADMIN — Medication 100 MILLIGRAM(S): at 06:13

## 2024-01-10 RX ADMIN — Medication 650 MILLIGRAM(S): at 06:12

## 2024-01-10 RX ADMIN — Medication 650 MILLIGRAM(S): at 22:18

## 2024-01-10 RX ADMIN — DIVALPROEX SODIUM 500 MILLIGRAM(S): 500 TABLET, DELAYED RELEASE ORAL at 06:13

## 2024-01-10 RX ADMIN — Medication 25 MILLIGRAM(S): at 06:13

## 2024-01-10 RX ADMIN — Medication 100 MILLIGRAM(S): at 22:18

## 2024-01-10 RX ADMIN — Medication 1: at 17:46

## 2024-01-10 RX ADMIN — Medication 25 MILLIGRAM(S): at 22:18

## 2024-01-10 RX ADMIN — HALOPERIDOL DECANOATE 2 MILLIGRAM(S): 100 INJECTION INTRAMUSCULAR at 22:18

## 2024-01-10 RX ADMIN — ISOSORBIDE MONONITRATE 30 MILLIGRAM(S): 60 TABLET, EXTENDED RELEASE ORAL at 12:55

## 2024-01-10 RX ADMIN — Medication 1: at 12:54

## 2024-01-10 RX ADMIN — INSULIN GLARGINE 8 UNIT(S): 100 INJECTION, SOLUTION SUBCUTANEOUS at 22:17

## 2024-01-10 RX ADMIN — DIVALPROEX SODIUM 500 MILLIGRAM(S): 500 TABLET, DELAYED RELEASE ORAL at 17:19

## 2024-01-10 NOTE — BH CONSULTATION LIAISON PROGRESS NOTE - NSBHCHARTREVIEWINVESTIGATE_PSY_A_CORE FT
< from: 12 Lead ECG (01.04.24 @ 23:51) >      Ventricular Rate 69 BPM    Atrial Rate 69 BPM    P-R Interval 160 ms    QRS Duration 76 ms    Q-T Interval 398 ms    QTC Calculation(Bazett) 426 ms    P Axis 63 degrees    R Axis 72 degrees    T Axis 52 degrees    Diagnosis Line BASELINE ARTIFACT  NORMAL SINUS RHYTHM  NORMAL ECG  WHEN COMPARED WITH ECG OF 04-JAN-2024 12:45, (UNCONFIRMED)  NO SIGNIFICANT CHANGE WAS FOUND  Confirmed by MD Minh, Mukesh ([id number]) on 1/5/2024 7:37:24 PM    < end of copied text > < from: 12 Lead ECG (01.04.24 @ 23:51) >      Ventricular Rate 69 BPM    Atrial Rate 69 BPM    P-R Interval 160 ms    QRS Duration 76 ms    Q-T Interval 398 ms    QTC Calculation(Bazett) 426 ms    P Axis 63 degrees    R Axis 72 degrees    T Axis 52 degrees    Diagnosis Line BASELINE ARTIFACT  NORMAL SINUS RHYTHM  NORMAL ECG  WHEN COMPARED WITH ECG OF 04-JAN-2024 12:45, (UNCONFIRMED)  NO SIGNIFICANT CHANGE WAS FOUND  Confirmed by MD Minh, Mukesh ([id number]) on 1/5/2024 7:37:24 PM    < end of copied text >

## 2024-01-10 NOTE — PROGRESS NOTE ADULT - SUBJECTIVE AND OBJECTIVE BOX
[facility] Division of Kidney Diseases & Hypertension  FOLLOW UP NOTE  [phone]--------------------------------------------------------------------------------    Chief Complaint: CKD     24 hour events/subjective:  Patient seen at bedside today. Has no acute complaints, asking to go back to [place]. Denies any headaches, fevers/chills, chest pain, palpitations, SOB, and leg swelling.    PAST HISTORY  --------------------------------------------------------------------------------  No significant changes to PMH, PSH, FHx, SHx, unless otherwise noted    ALLERGIES & MEDICATIONS  --------------------------------------------------------------------------------  Allergies    amlodipine (Other)  IV Contrast (Other)  Haldol (Other)    Intolerances      Standing Inpatient Medications  clopidogrel Tablet 75 milliGRAM(s) Oral daily  dextrose 5%. 1000 milliLiter(s) IV Continuous <Continuous>  dextrose 5%. 1000 milliLiter(s) IV Continuous <Continuous>  dextrose 50% Injectable 12.5 Gram(s) IV Push once  dextrose 50% Injectable 25 Gram(s) IV Push once  dextrose 50% Injectable 25 Gram(s) IV Push once  divalproex  milliGRAM(s) Oral two times a day  finasteride 5 milliGRAM(s) Oral daily  glipiZIDE. 5 milliGRAM(s) Oral daily  glucagon  Injectable 1 milliGRAM(s) IntraMuscular once  haloperidol     Tablet 2 milliGRAM(s) Oral at bedtime  hydrALAZINE 25 milliGRAM(s) Oral three times a day  insulin glargine Injectable (LANTUS) 8 Unit(s) SubCutaneous at bedtime  insulin lispro (ADMELOG) corrective regimen sliding scale   SubCutaneous three times a day before meals  isosorbide   mononitrate ER Tablet (IMDUR) 30 milliGRAM(s) Oral daily  metoprolol tartrate 100 milliGRAM(s) Oral daily  sodium bicarbonate 650 milliGRAM(s) Oral three times a day  tamsulosin 0.4 milliGRAM(s) Oral at bedtime  traZODone 100 milliGRAM(s) Oral at bedtime    PRN Inpatient Medications  dextrose Oral Gel 15 Gram(s) Oral once PRN  haloperidol    Injectable 2 milliGRAM(s) IV Push every 6 hours PRN      REVIEW OF SYSTEMS  --------------------------------------------------------------------------------  per above    VITALS/PHYSICAL EXAM  --------------------------------------------------------------------------------  T(C): 36.4 (01-10-24 @ 12:28), Max: 37 (01-09-24 @ 14:52)  HR: 83 (01-10-24 @ 12:28) (65 - 90)  BP: 135/72 (01-10-24 @ 12:28) (120/66 - 155/75)  RR: 18 (01-10-24 @ 12:28) (18 - 18)  SpO2: 100% (01-10-24 @ 12:28) (98% - 100%)  Wt(kg): --    Weight (kg): 74 (01-09-24 @ 19:10)      01-09-24 @ 07:01  -  01-10-24 @ 07:00  --------------------------------------------------------  IN: 420 mL / OUT: 500 mL / NET: -80 mL      Physical Exam:  	Gen: NAD; elderly; thin  	HEENT: Anicteric  	Pulm: CTA B/L  	CV: S1S2+  	Abd: Soft, +BS    	Ext: No LE edema B/L  	Neuro: Awake; not uremic     	Skin: Warm and dry  	Dialysis access: none    LABS/STUDIES  --------------------------------------------------------------------------------              7.7    6.56  >-----------<  110      [01-08-24 @ 16:11]              23.2     133  |  105  |  100  ----------------------------<  101      [01-08-24 @ 16:11]  5.0   |  16  |  5.55        Ca     8.2     [01-08-24 @ 16:11]      Mg     1.7     [01-08-24 @ 16:11]      Phos  4.6     [01-08-24 @ 16:11]            Creatinine Trend:  SCr 5.55 [01-08 @ 16:11]  SCr 5.01 [01-05 @ 07:25]  SCr 4.80 [01-05 @ 05:17]  SCr 5.53 [01-03 @ 15:34]  SCr 5.04 [12-26 @ 09:51]    Urinalysis - [01-08-24 @ 16:11]      Color  / Appearance  / SG  / pH       Gluc 101 / Ketone   / Bili  / Urobili        Blood  / Protein  / Leuk Est  / Nitrite       RBC  / WBC  / Hyaline  / Gran  / Sq Epi  / Non Sq Epi  / Bacteria            [facility] Division of Kidney Diseases & Hypertension  FOLLOW UP NOTE  [phone]--------------------------------------------------------------------------------    Chief Complaint: CKD     24 hour events/subjective:  Patient seen at bedside today. Has no acute complaints, asking to go back to [place]. Denies any headaches, fevers/chills, chest pain, palpitations, SOB, and leg swelling.    PAST HISTORY  --------------------------------------------------------------------------------  No significant changes to PMH, PSH, FHx, SHx, unless otherwise noted    ALLERGIES & MEDICATIONS  --------------------------------------------------------------------------------  Allergies    amlodipine (Other)  IV Contrast (Other)  Haldol (Other)    Intolerances      Standing Inpatient Medications  clopidogrel Tablet 75 milliGRAM(s) Oral daily  dextrose 5%. 1000 milliLiter(s) IV Continuous <Continuous>  dextrose 5%. 1000 milliLiter(s) IV Continuous <Continuous>  dextrose 50% Injectable 12.5 Gram(s) IV Push once  dextrose 50% Injectable 25 Gram(s) IV Push once  dextrose 50% Injectable 25 Gram(s) IV Push once  divalproex  milliGRAM(s) Oral two times a day  finasteride 5 milliGRAM(s) Oral daily  glipiZIDE. 5 milliGRAM(s) Oral daily  glucagon  Injectable 1 milliGRAM(s) IntraMuscular once  haloperidol     Tablet 2 milliGRAM(s) Oral at bedtime  hydrALAZINE 25 milliGRAM(s) Oral three times a day  insulin glargine Injectable (LANTUS) 8 Unit(s) SubCutaneous at bedtime  insulin lispro (ADMELOG) corrective regimen sliding scale   SubCutaneous three times a day before meals  isosorbide   mononitrate ER Tablet (IMDUR) 30 milliGRAM(s) Oral daily  metoprolol tartrate 100 milliGRAM(s) Oral daily  sodium bicarbonate 650 milliGRAM(s) Oral three times a day  tamsulosin 0.4 milliGRAM(s) Oral at bedtime  traZODone 100 milliGRAM(s) Oral at bedtime    PRN Inpatient Medications  dextrose Oral Gel 15 Gram(s) Oral once PRN  haloperidol    Injectable 2 milliGRAM(s) IV Push every 6 hours PRN      REVIEW OF SYSTEMS  --------------------------------------------------------------------------------  per above    VITALS/PHYSICAL EXAM  --------------------------------------------------------------------------------  T(C): 36.4 (01-10-24 @ 12:28), Max: 37 (01-09-24 @ 14:52)  HR: 83 (01-10-24 @ 12:28) (65 - 90)  BP: 135/72 (01-10-24 @ 12:28) (120/66 - 155/75)  RR: 18 (01-10-24 @ 12:28) (18 - 18)  SpO2: 100% (01-10-24 @ 12:28) (98% - 100%)  Wt(kg): --    Weight (kg): 74 (01-09-24 @ 19:10)      01-09-24 @ 07:01  -  01-10-24 @ 07:00  --------------------------------------------------------  IN: 420 mL / OUT: 500 mL / NET: -80 mL      Physical Exam:  	Gen: NAD; elderly; thin  	HEENT: Anicteric  	Pulm: CTA B/L  	CV: S1S2+  	Abd: Soft, +BS    	Ext: No LE edema B/L  	Neuro: Awake; not uremic     	Skin: Warm and dry  	Dialysis access: none    LABS/STUDIES  --------------------------------------------------------------------------------              7.7    6.56  >-----------<  110      [01-08-24 @ 16:11]              23.2     133  |  105  |  100  ----------------------------<  101      [01-08-24 @ 16:11]  5.0   |  16  |  5.55        Ca     8.2     [01-08-24 @ 16:11]      Mg     1.7     [01-08-24 @ 16:11]      Phos  4.6     [01-08-24 @ 16:11]            Creatinine Trend:  SCr 5.55 [01-08 @ 16:11]  SCr 5.01 [01-05 @ 07:25]  SCr 4.80 [01-05 @ 05:17]  SCr 5.53 [01-03 @ 15:34]  SCr 5.04 [12-26 @ 09:51]    Urinalysis - [01-08-24 @ 16:11]      Color  / Appearance  / SG  / pH       Gluc 101 / Ketone   / Bili  / Urobili        Blood  / Protein  / Leuk Est  / Nitrite       RBC  / WBC  / Hyaline  / Gran  / Sq Epi  / Non Sq Epi  / Bacteria
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
## 2024-01-10 NOTE — BH CONSULTATION LIAISON PROGRESS NOTE - NSBHFUPINTERVALHXFT_PSY_A_CORE
Pt. seen and evaluated, staff consulted, chart, labs, meds reviewed. He remains intermittently irritable, les confrontational and combative than during prior days,  also wanting to walk. Today, patient maintains a steady improvement in clarity of sensorium. Denies SI/HI.    As per staff, patient intermittently refusing meds. Yes

## 2024-01-10 NOTE — PROGRESS NOTE ADULT - PROBLEM SELECTOR PLAN 1
Pt with chronic kidney disease, known to be stage 5 from uncontrolled HTN. Was recently admitted to [facility] for similar reasons and noted to have a Scr of 5.04. Dr. Escobar saw patient while he was here and discussed with sister Eli Okeefe today  and Ms Cedillo at the [facility]- he attends his appointments regularly.     Now here with BUN/Cr of 90/5 stable from last admission, was 5.5 on 1/8. No labs today. Please repeat BMP including phos and bladder scan.     Low serum bicarb- please increase sodium bicarb to 1300 TID.     Patient would like to get his transplant in [place]. Asked him to follow up with [facility] or [facility].     If you have any questions, please feel free to contact me  Lukas Espitia  Nephrology Fellow  [phone]; Prefer [employer] TEAMS  (After 5pm or on weekends please page the on-call fellow). Pt with chronic kidney disease, known to be stage 5 from uncontrolled HTN. Was recently admitted to [facility] for similar reasons and noted to have a Scr of 5.04. Dr. Escobar saw patient while he was here and discussed with sister Eli Okeefe today  and Ms Cedillo at the [facility]- he attends his appointments regularly.     Now here with BUN/Cr of 90/5 stable from last admission, was 5.5 on 1/8. No labs today. Please repeat BMP including phos and bladder scan.     Low serum bicarb- please increase sodium bicarb to 1300 TID.     Patient would like to get his transplant in [place]. Asked him to follow up with [facility] or [facility].     If you have any questions, please feel free to contact me  Lukas Espitia  Nephrology Fellow  [phone]; Prefer [employer] TEAMS  (After 5pm or on weekends please page the on-call fellow).
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
## 2024-01-10 NOTE — PROGRESS NOTE ADULT - ATTENDING COMMENTS
#CKD  Stage V, known since last admission December, [facility]  awaiting labs today  please check bladder scan and r/o urinary retention   #met acidosis  cont sodium bicarb tabs  increase bicarb tabs as above

## 2024-01-10 NOTE — PROGRESS NOTE ADULT - ASSESSMENT
79 yo male h/o schizophrenia, dementia, agitation, ckd, dm, htn, presenting to ER with agitation    schizophrenia/dementia/agitation  psy following  plan for inpt psy admission 2PC  awaiting bed  psy meds as per psy  d/w psych attending today  pt needs constant observation and will be 2pc to inpt psy facility     ckd  stable   creat at baseline  pt makes urine  no need for urgent HD  to f/u with outpt nephrologist as noted in nephrology consult note from 12/27/23  renal f/u noted    HTN  resume outpt bp meds    anemia  chronic  due to ckd  iron supp  monitor     dm  lantus and iss   monitor fs    pt medically stable for dc to inpt psy facility   pt awaiting bed at inpt psy facility       Advanced care planning was discussed with patient and family.  Advanced care planning forms were reviewed and discussed as appropriate.  Differential diagnosis and plan of care discussed with patient after the evaluation.   Pain assessed and judicious use of narcotics when appropriate was discussed.  Importance of Fall prevention discussed.  Counseling on Smoking and Alcohol cessation was offered when appropriate.  Counseling on Diet, exercise, and medication compliance was done.       Approx 75 minutes spent.

## 2024-01-11 LAB
ANION GAP SERPL CALC-SCNC: 13 MMOL/L — SIGNIFICANT CHANGE UP (ref 5–17)
ANION GAP SERPL CALC-SCNC: 13 MMOL/L — SIGNIFICANT CHANGE UP (ref 5–17)
BUN SERPL-MCNC: 96 MG/DL — HIGH (ref 7–23)
BUN SERPL-MCNC: 96 MG/DL — HIGH (ref 7–23)
CALCIUM SERPL-MCNC: 8.4 MG/DL — SIGNIFICANT CHANGE UP (ref 8.4–10.5)
CALCIUM SERPL-MCNC: 8.4 MG/DL — SIGNIFICANT CHANGE UP (ref 8.4–10.5)
CHLORIDE SERPL-SCNC: 107 MMOL/L — SIGNIFICANT CHANGE UP (ref 96–108)
CHLORIDE SERPL-SCNC: 107 MMOL/L — SIGNIFICANT CHANGE UP (ref 96–108)
CO2 SERPL-SCNC: 19 MMOL/L — LOW (ref 22–31)
CO2 SERPL-SCNC: 19 MMOL/L — LOW (ref 22–31)
CREAT SERPL-MCNC: 5.41 MG/DL — HIGH (ref 0.5–1.3)
CREAT SERPL-MCNC: 5.41 MG/DL — HIGH (ref 0.5–1.3)
EGFR: 10 ML/MIN/1.73M2 — LOW
EGFR: 10 ML/MIN/1.73M2 — LOW
GLUCOSE BLDC GLUCOMTR-MCNC: 100 MG/DL — HIGH (ref 70–99)
GLUCOSE BLDC GLUCOMTR-MCNC: 100 MG/DL — HIGH (ref 70–99)
GLUCOSE BLDC GLUCOMTR-MCNC: 117 MG/DL — HIGH (ref 70–99)
GLUCOSE BLDC GLUCOMTR-MCNC: 117 MG/DL — HIGH (ref 70–99)
GLUCOSE BLDC GLUCOMTR-MCNC: 147 MG/DL — HIGH (ref 70–99)
GLUCOSE BLDC GLUCOMTR-MCNC: 147 MG/DL — HIGH (ref 70–99)
GLUCOSE BLDC GLUCOMTR-MCNC: 96 MG/DL — SIGNIFICANT CHANGE UP (ref 70–99)
GLUCOSE BLDC GLUCOMTR-MCNC: 96 MG/DL — SIGNIFICANT CHANGE UP (ref 70–99)
GLUCOSE SERPL-MCNC: 97 MG/DL — SIGNIFICANT CHANGE UP (ref 70–99)
GLUCOSE SERPL-MCNC: 97 MG/DL — SIGNIFICANT CHANGE UP (ref 70–99)
HCT VFR BLD CALC: 24.3 % — LOW (ref 39–50)
HCT VFR BLD CALC: 24.3 % — LOW (ref 39–50)
HGB BLD-MCNC: 7.9 G/DL — LOW (ref 13–17)
HGB BLD-MCNC: 7.9 G/DL — LOW (ref 13–17)
MCHC RBC-ENTMCNC: 28.7 PG — SIGNIFICANT CHANGE UP (ref 27–34)
MCHC RBC-ENTMCNC: 28.7 PG — SIGNIFICANT CHANGE UP (ref 27–34)
MCHC RBC-ENTMCNC: 32.5 GM/DL — SIGNIFICANT CHANGE UP (ref 32–36)
MCHC RBC-ENTMCNC: 32.5 GM/DL — SIGNIFICANT CHANGE UP (ref 32–36)
MCV RBC AUTO: 88.4 FL — SIGNIFICANT CHANGE UP (ref 80–100)
MCV RBC AUTO: 88.4 FL — SIGNIFICANT CHANGE UP (ref 80–100)
NRBC # BLD: 0 /100 WBCS — SIGNIFICANT CHANGE UP (ref 0–0)
NRBC # BLD: 0 /100 WBCS — SIGNIFICANT CHANGE UP (ref 0–0)
PLATELET # BLD AUTO: 116 K/UL — LOW (ref 150–400)
PLATELET # BLD AUTO: 116 K/UL — LOW (ref 150–400)
POTASSIUM SERPL-MCNC: 5.5 MMOL/L — HIGH (ref 3.5–5.3)
POTASSIUM SERPL-MCNC: 5.5 MMOL/L — HIGH (ref 3.5–5.3)
POTASSIUM SERPL-SCNC: 5.5 MMOL/L — HIGH (ref 3.5–5.3)
POTASSIUM SERPL-SCNC: 5.5 MMOL/L — HIGH (ref 3.5–5.3)
RBC # BLD: 2.75 M/UL — LOW (ref 4.2–5.8)
RBC # BLD: 2.75 M/UL — LOW (ref 4.2–5.8)
RBC # FLD: 14.2 % — SIGNIFICANT CHANGE UP (ref 10.3–14.5)
RBC # FLD: 14.2 % — SIGNIFICANT CHANGE UP (ref 10.3–14.5)
SODIUM SERPL-SCNC: 139 MMOL/L — SIGNIFICANT CHANGE UP (ref 135–145)
SODIUM SERPL-SCNC: 139 MMOL/L — SIGNIFICANT CHANGE UP (ref 135–145)
WBC # BLD: 4.14 K/UL — SIGNIFICANT CHANGE UP (ref 3.8–10.5)
WBC # BLD: 4.14 K/UL — SIGNIFICANT CHANGE UP (ref 3.8–10.5)
WBC # FLD AUTO: 4.14 K/UL — SIGNIFICANT CHANGE UP (ref 3.8–10.5)
WBC # FLD AUTO: 4.14 K/UL — SIGNIFICANT CHANGE UP (ref 3.8–10.5)

## 2024-01-11 PROCEDURE — 99233 SBSQ HOSP IP/OBS HIGH 50: CPT | Mod: GC

## 2024-01-11 PROCEDURE — 99232 SBSQ HOSP IP/OBS MODERATE 35: CPT

## 2024-01-11 RX ORDER — SODIUM BICARBONATE 1 MEQ/ML
1300 SYRINGE (ML) INTRAVENOUS THREE TIMES A DAY
Refills: 0 | Status: DISCONTINUED | OUTPATIENT
Start: 2024-01-11 | End: 2024-01-07

## 2024-01-11 RX ORDER — SODIUM ZIRCONIUM CYCLOSILICATE 10 G/10G
10 POWDER, FOR SUSPENSION ORAL ONCE
Refills: 0 | Status: COMPLETED | OUTPATIENT
Start: 2024-01-11 | End: 2024-01-11

## 2024-01-11 RX ADMIN — Medication 1300 MILLIGRAM(S): at 22:36

## 2024-01-11 RX ADMIN — FINASTERIDE 5 MILLIGRAM(S): 5 TABLET, FILM COATED ORAL at 14:21

## 2024-01-11 RX ADMIN — Medication 650 MILLIGRAM(S): at 06:31

## 2024-01-11 RX ADMIN — DIVALPROEX SODIUM 500 MILLIGRAM(S): 500 TABLET, DELAYED RELEASE ORAL at 18:03

## 2024-01-11 RX ADMIN — HALOPERIDOL DECANOATE 2 MILLIGRAM(S): 100 INJECTION INTRAMUSCULAR at 22:37

## 2024-01-11 RX ADMIN — Medication 100 MILLIGRAM(S): at 06:32

## 2024-01-11 RX ADMIN — ISOSORBIDE MONONITRATE 30 MILLIGRAM(S): 60 TABLET, EXTENDED RELEASE ORAL at 14:22

## 2024-01-11 RX ADMIN — CLOPIDOGREL BISULFATE 75 MILLIGRAM(S): 75 TABLET, FILM COATED ORAL at 14:22

## 2024-01-11 RX ADMIN — TAMSULOSIN HYDROCHLORIDE 0.4 MILLIGRAM(S): 0.4 CAPSULE ORAL at 22:37

## 2024-01-11 RX ADMIN — Medication 25 MILLIGRAM(S): at 06:32

## 2024-01-11 RX ADMIN — HALOPERIDOL DECANOATE 2 MILLIGRAM(S): 100 INJECTION INTRAMUSCULAR at 10:00

## 2024-01-11 RX ADMIN — DIVALPROEX SODIUM 500 MILLIGRAM(S): 500 TABLET, DELAYED RELEASE ORAL at 06:32

## 2024-01-11 RX ADMIN — INSULIN GLARGINE 8 UNIT(S): 100 INJECTION, SOLUTION SUBCUTANEOUS at 22:20

## 2024-01-11 RX ADMIN — SODIUM ZIRCONIUM CYCLOSILICATE 10 GRAM(S): 10 POWDER, FOR SUSPENSION ORAL at 10:01

## 2024-01-11 RX ADMIN — Medication 100 MILLIGRAM(S): at 22:36

## 2024-01-11 RX ADMIN — Medication 25 MILLIGRAM(S): at 22:36

## 2024-01-11 NOTE — DIETITIAN INITIAL EVALUATION ADULT - OTHER INFO
Unable to obtain UBW/wt hx from patient at this time.  Dosing weight: 163.1lb (1/9, bed).  IBW: 160lb, %IBW: 102% based on dosing weight.  RD unable to obtain bed weight at this time.  Weight history per [facility] HIE: 179.9lb (12/20/23), 179.9lb (1/3/24).  ?accuracy of HIE weights, would indicate 17lb loss x 1 week. RD to continue to monitor weight trends as available/able.     -Per nephrology consult note: Stage 5 chronic kidney disease not on chronic dialysis.   -Elevated phosphorus 1/8, no recent results to trend.  -Elevated K+ today, ordered for lokelma today.  -Finger sticks mostly elevated since admit. Ordered for glipizide, lantus 8 units at bedtime, Correctional sliding scale insulin.   Unable to obtain UBW/wt hx from patient at this time.  Dosing weight: 163.1lb (1/9, bed).  IBW: 160lb, %IBW: 102% based on dosing weight.  RD unable to obtain bed weight at this time.  Weight history per [facility] HIE: 179.9lb (12/20/23), 179.9lb (1/3/24).  ?accuracy of HIE weights, would indicate 17lb loss x 1 week. RD to continue to monitor weight trends as available/able.     -Per nephrology consult note: Stage 5 chronic kidney disease not on chronic dialysis.   -Elevated phosphorus 1/8, no recent results to trend.  -Elevated K+ today, ordered for lokelma today.  -Finger sticks mostly elevated since admit. Ordered for glipizide, lantus 8 units at bedtime, Correctional sliding scale insulin.

## 2024-01-11 NOTE — BH CONSULTATION LIAISON PROGRESS NOTE - NSBHCHARTREVIEWINVESTIGATE_PSY_A_CORE FT
< from: 12 Lead ECG (01.04.24 @ 23:51) >      Ventricular Rate 69 BPM    Atrial Rate 69 BPM    P-R Interval 160 ms    QRS Duration 76 ms    Q-T Interval 398 ms    QTC Calculation(Bazett) 426 ms    P Axis 63 degrees    R Axis 72 degrees    T Axis 52 degrees    Diagnosis Line BASELINE ARTIFACT  NORMAL SINUS RHYTHM  NORMAL ECG  WHEN COMPARED WITH ECG OF 04-JAN-2024 12:45, (UNCONFIRMED)  NO SIGNIFICANT CHANGE WAS FOUND  Confirmed by MD Minh, Mukesh ([id number]) on 1/5/2024 7:37:24 PM    < end of copied text > < from: 12 Lead ECG (01.04.24 @ 23:51) >      Ventricular Rate 69 BPM    Atrial Rate 69 BPM    P-R Interval 160 ms    QRS Duration 76 ms    Q-T Interval 398 ms    QTC Calculation(Bazett) 426 ms    P Axis 63 degrees    R Axis 72 degrees    T Axis 52 degrees    Diagnosis Line BASELINE ARTIFACT  NORMAL SINUS RHYTHM  NORMAL ECG  WHEN COMPARED WITH ECG OF 04-JAN-2024 12:45, (UNCONFIRMED)  NO SIGNIFICANT CHANGE WAS FOUND  Confirmed by MD Minh, Mukesh ([id number]) on 1/5/2024 7:37:24 PM    < end of copied text >
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
## 2024-01-11 NOTE — DIETITIAN INITIAL EVALUATION ADULT - ENERGY INTAKE
Per PCA patient with good PO intake. Patient wanting sugar and salty foods per PCA - corn flakes and chowdhury. Patient reports calling down to order food. Patient complaining about diet restrictions. % intake of meals noted per flowsheets.

## 2024-01-11 NOTE — DIETITIAN INITIAL EVALUATION ADULT - ORAL INTAKE PTA/DIET HISTORY
Per chart patient residing in Nursing Home PTA. Per transfer papers, patient ordered for "[facility]al Diet" with regular textures and thin consistencies. No known food allergies per chart. Per ED note 1/3: pt reports fair sleep, appetite. Per chart patient residing in Nursing Home PTA. Per transfer papers, patient ordered for "[facility]al Diet" with regular textures and thin consistencies. No known food allergies per chart. Per ED note 1/3: pt reports fair sleep, appetite.

## 2024-01-11 NOTE — BH CONSULTATION LIAISON PROGRESS NOTE - NSBHFUPINTERVALHXFT_PSY_A_CORE
Pt. seen and evaluated, staff consulted, chart, labs, meds reviewed. Patient remains intermittently irritable, but less and less confrontational and combative than during prior days, also wants to walk around. Patient maintains a steady improvement in clarity of sensorium. Denies SI/HI.    As per staff, patient intermittently refusing meds.

## 2024-01-11 NOTE — DIETITIAN INITIAL EVALUATION ADULT - FACTORS AFF FOOD INTAKE
change in mental status/Orthodoxy/ethnic/cultural/personal food preferences change in mental status/Buddhist/ethnic/cultural/personal food preferences

## 2024-01-11 NOTE — DIETITIAN INITIAL EVALUATION ADULT - OTHER CALCULATIONS
Defer fluid needs to team.  Estimated nutrient needs based on dosing weight 163.1lb, with consideration for CKD
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
## 2024-01-11 NOTE — DIETITIAN INITIAL EVALUATION ADULT - REASON INDICATOR FOR ASSESSMENT
RD assessment warranted for: Consult for MST score 2 or >. Chart reviewed, events noted.  Source: medical record, 1:1 PCA, patient. RD assessment warranted for: Consult for MST score 2 or >. Chart reviewed, events noted.  Source: medical record, 1:1 PCA, patient, Nursing Home transfer papers.  Limited interview conducted with patient as patient with agitation/confusion per chart.

## 2024-01-11 NOTE — PROGRESS NOTE ADULT - PROBLEM SELECTOR PLAN 1
Pt with chronic kidney disease, known to be stage 5 from uncontrolled HTN. Was recently admitted to [facility] for similar reasons and noted to have a Scr of 5.04. Dr. Escobar saw patient while he was here and discussed with sister Eli Okeefe today  and Ms Cedillo at the [facility]- he attends his appointments regularly.     Now here with BUN/Cr of 90/5 stable from last admission, now Cr stable at ~5.5. Agree with Lokelma for hyperK. If continues to be hyperK, will consider adding loop diuretic    Low serum bicarb- continue sodium bicarb to 1300 TID.     Patient is cleared to go to inpatient psych from renal standpoint.    Ansley Tilley, DO  Nephrology Fellow  Feel free to contact me directly on TEAMS with any questions.  (After 5pm or on weekends, please call the on-call fellow). Pt with chronic kidney disease, known to be stage 5 from uncontrolled HTN. Was recently admitted to [facility] for similar reasons and noted to have a Scr of 5.04. Dr. Escobar saw patient while he was here and discussed with sister Eli Okeefe today  and Ms Cedillo at the [facility]- he attends his appointments regularly.     Now here with BUN/Cr of 90/5 stable from last admission, now Cr stable at ~5.5. Agree with Lokelma for hyperK. If continues to be hyperK, will consider adding loop diuretic    Low serum bicarb- continue sodium bicarb to 1300 TID.     Patient is cleared to go to inpatient psych from renal standpoint.    Ansley Tilley, DO  Nephrology Fellow  Feel free to contact me directly on TEAMS with any questions.  (After 5pm or on weekends, please call the on-call fellow).
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
## 2024-01-11 NOTE — DIETITIAN INITIAL EVALUATION ADULT - PERTINENT MEDS FT
MEDICATIONS  (STANDING):  clopidogrel Tablet 75 milliGRAM(s) Oral daily  dextrose 5%. 1000 milliLiter(s) (50 mL/Hr) IV Continuous <Continuous>  dextrose 5%. 1000 milliLiter(s) (100 mL/Hr) IV Continuous <Continuous>  dextrose 50% Injectable 12.5 Gram(s) IV Push once  dextrose 50% Injectable 25 Gram(s) IV Push once  dextrose 50% Injectable 25 Gram(s) IV Push once  divalproex  milliGRAM(s) Oral two times a day  finasteride 5 milliGRAM(s) Oral daily  glipiZIDE. 5 milliGRAM(s) Oral daily  glucagon  Injectable 1 milliGRAM(s) IntraMuscular once  haloperidol     Tablet 2 milliGRAM(s) Oral at bedtime  hydrALAZINE 25 milliGRAM(s) Oral three times a day  insulin glargine Injectable (LANTUS) 8 Unit(s) SubCutaneous at bedtime  insulin lispro (ADMELOG) corrective regimen sliding scale   SubCutaneous three times a day before meals  isosorbide   mononitrate ER Tablet (IMDUR) 30 milliGRAM(s) Oral daily  metoprolol tartrate 100 milliGRAM(s) Oral daily  sodium bicarbonate 650 milliGRAM(s) Oral three times a day  tamsulosin 0.4 milliGRAM(s) Oral at bedtime  traZODone 100 milliGRAM(s) Oral at bedtime    MEDICATIONS  (PRN):  dextrose Oral Gel 15 Gram(s) Oral once PRN Blood Glucose LESS THAN 70 milliGRAM(s)/deciliter  haloperidol    Injectable 2 milliGRAM(s) IV Push every 6 hours PRN agitation   MEDICATIONS  (STANDING):  clopidogrel Tablet 75 milliGRAM(s) Oral daily  divalproex  milliGRAM(s) Oral two times a day  finasteride 5 milliGRAM(s) Oral daily  glipiZIDE. 5 milliGRAM(s) Oral daily  haloperidol     Tablet 2 milliGRAM(s) Oral at bedtime  hydrALAZINE 25 milliGRAM(s) Oral three times a day  insulin glargine Injectable (LANTUS) 8 Unit(s) SubCutaneous at bedtime  insulin lispro (ADMELOG) corrective regimen sliding scale   SubCutaneous three times a day before meals  isosorbide   mononitrate ER Tablet (IMDUR) 30 milliGRAM(s) Oral daily  metoprolol tartrate 100 milliGRAM(s) Oral daily  sodium bicarbonate 650 milliGRAM(s) Oral three times a day  tamsulosin 0.4 milliGRAM(s) Oral at bedtime  traZODone 100 milliGRAM(s) Oral at bedtime    MEDICATIONS  (PRN):  haloperidol    Injectable 2 milliGRAM(s) IV Push every 6 hours PRN agitation

## 2024-01-11 NOTE — DIETITIAN INITIAL EVALUATION ADULT - NSFNSADHERENCEPTAFT_GEN_A_CORE
Patient with hx DM, A1C 5.1% from 1/5/24 indicates good glycemic control PTA. Per H&P Home Medications patient taking glipizide PTA and insulin lantus 10 units at beditme.

## 2024-01-11 NOTE — PROGRESS NOTE ADULT - ASSESSMENT
79 yo male h/o schizophrenia, dementia, agitation, ckd, dm, htn, presenting to ER with agitation    schizophrenia/dementia/agitation  psy following  plan for inpt psy admission 2PC  awaiting bed  psy meds as per psy  d/w psych attending today  pt needs constant observation and will be 2pc to inpt psy facility     ckd  stable   creat at baseline  pt makes urine  no need for urgent HD  lokelma given for hyperKalemia today  to f/u with outpt nephrologist as noted in nephrology consult note from 12/27/23  renal f/u noted  no plan for dialysis at this time. pt will need weekly bmp. as long as creat remains stable and bmp wnl then no plans to start HD.    will need routine f/u with outpt nephrologist     HTN  resume outpt bp meds    anemia  chronic  due to ckd  iron supp  monitor     dm  lantus and iss   monitor fs    pt medically stable for dc to inpt psy facility   pt awaiting bed at inpt psy facility       Advanced care planning was discussed with patient and family.  Advanced care planning forms were reviewed and discussed as appropriate.  Differential diagnosis and plan of care discussed with patient after the evaluation.   Pain assessed and judicious use of narcotics when appropriate was discussed.  Importance of Fall prevention discussed.  Counseling on Smoking and Alcohol cessation was offered when appropriate.  Counseling on Diet, exercise, and medication compliance was done.       Approx 75 minutes spent. 77 yo male h/o schizophrenia, dementia, agitation, ckd, dm, htn, presenting to ER with agitation    schizophrenia/dementia/agitation  psy following  plan for inpt psy admission 2PC  awaiting bed  psy meds as per psy  d/w psych attending today  pt needs constant observation and will be 2pc to inpt psy facility     ckd  stable   creat at baseline  pt makes urine  no need for urgent HD  lokelma given for hyperKalemia today  to f/u with outpt nephrologist as noted in nephrology consult note from 12/27/23  renal f/u noted  no plan for dialysis at this time. pt will need weekly bmp. as long as creat remains stable and bmp wnl then no plans to start HD.    will need routine f/u with outpt nephrologist     HTN  resume outpt bp meds    anemia  chronic  due to ckd  iron supp  monitor     dm  lantus and iss   monitor fs    pt medically stable for dc to inpt psy facility   pt awaiting bed at inpt psy facility       Advanced care planning was discussed with patient and family.  Advanced care planning forms were reviewed and discussed as appropriate.  Differential diagnosis and plan of care discussed with patient after the evaluation.   Pain assessed and judicious use of narcotics when appropriate was discussed.  Importance of Fall prevention discussed.  Counseling on Smoking and Alcohol cessation was offered when appropriate.  Counseling on Diet, exercise, and medication compliance was done.       Approx 75 minutes spent.

## 2024-01-11 NOTE — DIETITIAN INITIAL EVALUATION ADULT - NS FNS DIET ORDER
Diet, Regular:   Consistent Carbohydrate {No Snacks} (CSTCHO)  DASH/TLC {Sodium & Cholesterol Restricted} (DASH) (01-10-24 @ 07:36) [Active]

## 2024-01-11 NOTE — DIETITIAN INITIAL EVALUATION ADULT - PERTINENT LABORATORY DATA
01-11    139  |  107  |  96<H>  ----------------------------<  97  5.5<H>   |  19<L>  |  5.41<H>    Ca    8.4      11 Jan 2024 07:03    POCT Blood Glucose.: 96 mg/dL (01-11-24 @ 09:05)  A1C with Estimated Average Glucose Result: 5.1 % (01-05-24 @ 05:17)   01-11    139  |  107  |  96<H>  ----------------------------<  97  5.5<H>   |  19<L>  |  5.41<H>    Ca    8.4      11 Jan 2024 07:03    POCT Blood Glucose.: 96 mg/dL (11 Jan 2024 09:05)  POCT Blood Glucose.: 117 mg/dL (10 Mikel 2024 21:36)  POCT Blood Glucose.: 164 mg/dL (10 Mikel 2024 17:42)  POCT Blood Glucose.: 168 mg/dL (10 Mikel 2024 12:38)    A1C with Estimated Average Glucose Result: 5.1 % (01-05-24 @ 05:17)

## 2024-01-11 NOTE — DIETITIAN INITIAL EVALUATION ADULT - NUTRITIONGOAL OUTCOME1
Pt will be able to adhere to nutrition related recommendations to improve Finger Sticks and electrolytes

## 2024-01-11 NOTE — PROGRESS NOTE ADULT - SUBJECTIVE AND OBJECTIVE BOX
[facility] DIVISION OF KIDNEY DISEASES AND HYPERTENSION   FOLLOW UP NOTE    --------------------------------------------------------------------------------    SUBJECTIVE / ROS / INTERVAL EVENTS:  - Patient seen and examined at bedside  - Renal function stable, mild hyperK this AM s/p Lokelma  - Bladder scan did not show significant retention      PAST HISTORY  --------------------------------------------------------------------------------  No significant changes to PMH, PSH, FHx, SHx, unless otherwise noted    ALLERGIES & MEDICATIONS  --------------------------------------------------------------------------------  Allergies    amlodipine (Other)  IV Contrast (Other)  Haldol (Other)      Standing Inpatient Medications  clopidogrel Tablet 75 milliGRAM(s) Oral daily  dextrose 5%. 1000 milliLiter(s) IV Continuous <Continuous>  dextrose 5%. 1000 milliLiter(s) IV Continuous <Continuous>  dextrose 50% Injectable 12.5 Gram(s) IV Push once  dextrose 50% Injectable 25 Gram(s) IV Push once  dextrose 50% Injectable 25 Gram(s) IV Push once  divalproex  milliGRAM(s) Oral two times a day  finasteride 5 milliGRAM(s) Oral daily  glipiZIDE. 5 milliGRAM(s) Oral daily  glucagon  Injectable 1 milliGRAM(s) IntraMuscular once  haloperidol     Tablet 2 milliGRAM(s) Oral at bedtime  hydrALAZINE 25 milliGRAM(s) Oral three times a day  insulin glargine Injectable (LANTUS) 8 Unit(s) SubCutaneous at bedtime  insulin lispro (ADMELOG) corrective regimen sliding scale   SubCutaneous three times a day before meals  isosorbide   mononitrate ER Tablet (IMDUR) 30 milliGRAM(s) Oral daily  metoprolol tartrate 100 milliGRAM(s) Oral daily  sodium bicarbonate 1300 milliGRAM(s) Oral three times a day  tamsulosin 0.4 milliGRAM(s) Oral at bedtime  traZODone 100 milliGRAM(s) Oral at bedtime    PRN Inpatient Medications  dextrose Oral Gel 15 Gram(s) Oral once PRN  haloperidol    Injectable 2 milliGRAM(s) IV Push every 6 hours PRN      VITALS  --------------------------------------------------------------------------------  T(C): 36.3 (01-11-24 @ 04:13), Max: 36.3 (01-10-24 @ 20:28)  HR: 96 (01-11-24 @ 04:13) (82 - 96)  BP: 164/72 (01-11-24 @ 04:13) (144/74 - 164/72)  RR: 18 (01-11-24 @ 04:13) (17 - 18)  SpO2: 100% (01-11-24 @ 04:13) (100% - 100%)  Wt(kg): --    Weight (kg): 74 (01-09-24 @ 19:10)    01-10-24 @ 07:01  -  01-11-24 @ 07:00  --------------------------------------------------------  IN: 0 mL / OUT: 300 mL / NET: -300 mL      PHYSICAL EXAM:  General: no acute distress  Neuro: no focal deficits  HEENT: NC/AT, anicteric, no JVD  Pulmonary: lungs CTA B/L  Cardiovascular/Chest: +S1S2, RRR  GI/Abdomen: soft, NT/ND, +bowel sounds  Extremities: 1+ LE edema  Skin: Warm and dry    LABS/STUDIES  --------------------------------------------------------------------------------              7.9    4.14  >-----------<  116      [01-11-24 @ 07:07]              24.3     139  |  107  |  96  ----------------------------<  97      [01-11-24 @ 07:03]  5.5   |  19  |  5.41        Ca     8.4     [01-11-24 @ 07:03]        Creatinine Trend:  SCr 5.41 [01-11 @ 07:03]  SCr 5.55 [01-08 @ 16:11]  SCr 5.01 [01-05 @ 07:25]  SCr 4.80 [01-05 @ 05:17]  SCr 5.53 [01-03 @ 15:34]    Urinalysis - [01-11-24 @ 07:03]      Color  / Appearance  / SG  / pH       Gluc 97 / Ketone   / Bili  / Urobili        Blood  / Protein  / Leuk Est  / Nitrite       RBC  / WBC  / Hyaline  / Gran  / Sq Epi  / Non Sq Epi  / Bacteria  [facility] DIVISION OF KIDNEY DISEASES AND HYPERTENSION   FOLLOW UP NOTE    --------------------------------------------------------------------------------    SUBJECTIVE / ROS / INTERVAL EVENTS:  - Patient seen and examined at bedside  - Renal function stable, mild hyperK this AM s/p Lokelma  - Bladder scan did not show significant retention      PAST HISTORY  --------------------------------------------------------------------------------  No significant changes to PMH, PSH, FHx, SHx, unless otherwise noted    ALLERGIES & MEDICATIONS  --------------------------------------------------------------------------------  Allergies    amlodipine (Other)  IV Contrast (Other)  Haldol (Other)      Standing Inpatient Medications  clopidogrel Tablet 75 milliGRAM(s) Oral daily  dextrose 5%. 1000 milliLiter(s) IV Continuous <Continuous>  dextrose 5%. 1000 milliLiter(s) IV Continuous <Continuous>  dextrose 50% Injectable 12.5 Gram(s) IV Push once  dextrose 50% Injectable 25 Gram(s) IV Push once  dextrose 50% Injectable 25 Gram(s) IV Push once  divalproex  milliGRAM(s) Oral two times a day  finasteride 5 milliGRAM(s) Oral daily  glipiZIDE. 5 milliGRAM(s) Oral daily  glucagon  Injectable 1 milliGRAM(s) IntraMuscular once  haloperidol     Tablet 2 milliGRAM(s) Oral at bedtime  hydrALAZINE 25 milliGRAM(s) Oral three times a day  insulin glargine Injectable (LANTUS) 8 Unit(s) SubCutaneous at bedtime  insulin lispro (ADMELOG) corrective regimen sliding scale   SubCutaneous three times a day before meals  isosorbide   mononitrate ER Tablet (IMDUR) 30 milliGRAM(s) Oral daily  metoprolol tartrate 100 milliGRAM(s) Oral daily  sodium bicarbonate 1300 milliGRAM(s) Oral three times a day  tamsulosin 0.4 milliGRAM(s) Oral at bedtime  traZODone 100 milliGRAM(s) Oral at bedtime    PRN Inpatient Medications  dextrose Oral Gel 15 Gram(s) Oral once PRN  haloperidol    Injectable 2 milliGRAM(s) IV Push every 6 hours PRN      VITALS  --------------------------------------------------------------------------------  T(C): 36.3 (01-11-24 @ 04:13), Max: 36.3 (01-10-24 @ 20:28)  HR: 96 (01-11-24 @ 04:13) (82 - 96)  BP: 164/72 (01-11-24 @ 04:13) (144/74 - 164/72)  RR: 18 (01-11-24 @ 04:13) (17 - 18)  SpO2: 100% (01-11-24 @ 04:13) (100% - 100%)  Wt(kg): --    Weight (kg): 74 (01-09-24 @ 19:10)    01-10-24 @ 07:01  -  01-11-24 @ 07:00  --------------------------------------------------------  IN: 0 mL / OUT: 300 mL / NET: -300 mL      PHYSICAL EXAM:  General: no acute distress  Neuro: no focal deficits  HEENT: NC/AT, anicteric, no JVD  Pulmonary: lungs CTA B/L  Cardiovascular/Chest: +S1S2, RRR  GI/Abdomen: soft, NT/ND, +bowel sounds  Extremities: 1+ LE edema  Skin: Warm and dry    LABS/STUDIES  --------------------------------------------------------------------------------              7.9    4.14  >-----------<  116      [01-11-24 @ 07:07]              24.3     139  |  107  |  96  ----------------------------<  97      [01-11-24 @ 07:03]  5.5   |  19  |  5.41        Ca     8.4     [01-11-24 @ 07:03]        Creatinine Trend:  SCr 5.41 [01-11 @ 07:03]  SCr 5.55 [01-08 @ 16:11]  SCr 5.01 [01-05 @ 07:25]  SCr 4.80 [01-05 @ 05:17]  SCr 5.53 [01-03 @ 15:34]    Urinalysis - [01-11-24 @ 07:03]      Color  / Appearance  / SG  / pH       Gluc 97 / Ketone   / Bili  / Urobili        Blood  / Protein  / Leuk Est  / Nitrite       RBC  / WBC  / Hyaline  / Gran  / Sq Epi  / Non Sq Epi  / Bacteria

## 2024-01-11 NOTE — PROGRESS NOTE ADULT - ATTENDING COMMENTS
#CKD  Stage V, known since last admission December, followed by [facility]  stable  #met acidosis  cont sodium bicarb tabs  now on 1300mg po tid  #hyperkalemia  low k diet  lokelma 10g and then repeat k this afternoon

## 2024-01-11 NOTE — DIETITIAN INITIAL EVALUATION ADULT - ADD RECOMMEND
-Recommend liberalizing DASH diet restriction, continuing Consistent Carbohydrate diet restriction in setting of elevated/fluctating Finger Sticks, and adding Renal diet restrictions (Low Sodium, No Concentrated Potassium, No Concentrated Phosphorus) in setting of CKD/elevated K+ and phosphorus.  -If Finger Sticks become persistently WDL, can consider liberalizing Consistent Carbohydrate diet restriction.  -Monitor PO intake, diet, weight, labs, skin, GI symptoms, and BM regularity.  -RD remains available upon request and will follow up per protocol.

## 2024-01-12 DIAGNOSIS — R79.89 OTHER SPECIFIED ABNORMAL FINDINGS OF BLOOD CHEMISTRY: ICD-10-CM

## 2024-01-12 DIAGNOSIS — E87.5 HYPERKALEMIA: ICD-10-CM

## 2024-01-12 LAB
ANION GAP SERPL CALC-SCNC: 11 MMOL/L — SIGNIFICANT CHANGE UP (ref 5–17)
ANION GAP SERPL CALC-SCNC: 11 MMOL/L — SIGNIFICANT CHANGE UP (ref 5–17)
ANION GAP SERPL CALC-SCNC: 12 MMOL/L — SIGNIFICANT CHANGE UP (ref 5–17)
BUN SERPL-MCNC: 90 MG/DL — HIGH (ref 7–23)
BUN SERPL-MCNC: 90 MG/DL — HIGH (ref 7–23)
BUN SERPL-MCNC: 92 MG/DL — HIGH (ref 7–23)
BUN SERPL-MCNC: 92 MG/DL — HIGH (ref 7–23)
BUN SERPL-MCNC: 95 MG/DL — HIGH (ref 7–23)
BUN SERPL-MCNC: 95 MG/DL — HIGH (ref 7–23)
CALCIUM SERPL-MCNC: 8.3 MG/DL — LOW (ref 8.4–10.5)
CALCIUM SERPL-MCNC: 8.5 MG/DL — SIGNIFICANT CHANGE UP (ref 8.4–10.5)
CALCIUM SERPL-MCNC: 8.5 MG/DL — SIGNIFICANT CHANGE UP (ref 8.4–10.5)
CHLORIDE SERPL-SCNC: 102 MMOL/L — SIGNIFICANT CHANGE UP (ref 96–108)
CHLORIDE SERPL-SCNC: 102 MMOL/L — SIGNIFICANT CHANGE UP (ref 96–108)
CHLORIDE SERPL-SCNC: 103 MMOL/L — SIGNIFICANT CHANGE UP (ref 96–108)
CO2 SERPL-SCNC: 19 MMOL/L — LOW (ref 22–31)
CO2 SERPL-SCNC: 19 MMOL/L — LOW (ref 22–31)
CO2 SERPL-SCNC: 20 MMOL/L — LOW (ref 22–31)
CO2 SERPL-SCNC: 20 MMOL/L — LOW (ref 22–31)
CO2 SERPL-SCNC: 21 MMOL/L — LOW (ref 22–31)
CO2 SERPL-SCNC: 21 MMOL/L — LOW (ref 22–31)
CREAT SERPL-MCNC: 5.06 MG/DL — HIGH (ref 0.5–1.3)
CREAT SERPL-MCNC: 5.06 MG/DL — HIGH (ref 0.5–1.3)
CREAT SERPL-MCNC: 5.23 MG/DL — HIGH (ref 0.5–1.3)
CREAT SERPL-MCNC: 5.23 MG/DL — HIGH (ref 0.5–1.3)
CREAT SERPL-MCNC: 5.34 MG/DL — HIGH (ref 0.5–1.3)
CREAT SERPL-MCNC: 5.34 MG/DL — HIGH (ref 0.5–1.3)
EGFR: 10 ML/MIN/1.73M2 — LOW
EGFR: 10 ML/MIN/1.73M2 — LOW
EGFR: 11 ML/MIN/1.73M2 — LOW
GLUCOSE BLDC GLUCOMTR-MCNC: 101 MG/DL — HIGH (ref 70–99)
GLUCOSE BLDC GLUCOMTR-MCNC: 101 MG/DL — HIGH (ref 70–99)
GLUCOSE BLDC GLUCOMTR-MCNC: 110 MG/DL — HIGH (ref 70–99)
GLUCOSE BLDC GLUCOMTR-MCNC: 110 MG/DL — HIGH (ref 70–99)
GLUCOSE BLDC GLUCOMTR-MCNC: 159 MG/DL — HIGH (ref 70–99)
GLUCOSE BLDC GLUCOMTR-MCNC: 159 MG/DL — HIGH (ref 70–99)
GLUCOSE BLDC GLUCOMTR-MCNC: 161 MG/DL — HIGH (ref 70–99)
GLUCOSE BLDC GLUCOMTR-MCNC: 161 MG/DL — HIGH (ref 70–99)
GLUCOSE BLDC GLUCOMTR-MCNC: 68 MG/DL — LOW (ref 70–99)
GLUCOSE BLDC GLUCOMTR-MCNC: 68 MG/DL — LOW (ref 70–99)
GLUCOSE BLDC GLUCOMTR-MCNC: 94 MG/DL — SIGNIFICANT CHANGE UP (ref 70–99)
GLUCOSE BLDC GLUCOMTR-MCNC: 94 MG/DL — SIGNIFICANT CHANGE UP (ref 70–99)
GLUCOSE SERPL-MCNC: 114 MG/DL — HIGH (ref 70–99)
GLUCOSE SERPL-MCNC: 114 MG/DL — HIGH (ref 70–99)
GLUCOSE SERPL-MCNC: 130 MG/DL — HIGH (ref 70–99)
GLUCOSE SERPL-MCNC: 130 MG/DL — HIGH (ref 70–99)
GLUCOSE SERPL-MCNC: 85 MG/DL — SIGNIFICANT CHANGE UP (ref 70–99)
GLUCOSE SERPL-MCNC: 85 MG/DL — SIGNIFICANT CHANGE UP (ref 70–99)
HCT VFR BLD CALC: 24 % — LOW (ref 39–50)
HCT VFR BLD CALC: 24 % — LOW (ref 39–50)
HCV AB S/CO SERPL IA: 12.81 S/CO — HIGH (ref 0–0.99)
HCV AB S/CO SERPL IA: 12.81 S/CO — HIGH (ref 0–0.99)
HCV AB SERPL-IMP: REACTIVE
HCV AB SERPL-IMP: REACTIVE
HCV RNA FLD QL NAA+PROBE: SIGNIFICANT CHANGE UP
HCV RNA FLD QL NAA+PROBE: SIGNIFICANT CHANGE UP
HCV RNA SPEC QL PROBE+SIG AMP: SIGNIFICANT CHANGE UP
HCV RNA SPEC QL PROBE+SIG AMP: SIGNIFICANT CHANGE UP
HGB BLD-MCNC: 7.6 G/DL — LOW (ref 13–17)
HGB BLD-MCNC: 7.6 G/DL — LOW (ref 13–17)
MAGNESIUM SERPL-MCNC: 1.7 MG/DL — SIGNIFICANT CHANGE UP (ref 1.6–2.6)
MAGNESIUM SERPL-MCNC: 1.7 MG/DL — SIGNIFICANT CHANGE UP (ref 1.6–2.6)
MCHC RBC-ENTMCNC: 28.1 PG — SIGNIFICANT CHANGE UP (ref 27–34)
MCHC RBC-ENTMCNC: 28.1 PG — SIGNIFICANT CHANGE UP (ref 27–34)
MCHC RBC-ENTMCNC: 31.7 GM/DL — LOW (ref 32–36)
MCHC RBC-ENTMCNC: 31.7 GM/DL — LOW (ref 32–36)
MCV RBC AUTO: 88.9 FL — SIGNIFICANT CHANGE UP (ref 80–100)
MCV RBC AUTO: 88.9 FL — SIGNIFICANT CHANGE UP (ref 80–100)
NRBC # BLD: 0 /100 WBCS — SIGNIFICANT CHANGE UP (ref 0–0)
NRBC # BLD: 0 /100 WBCS — SIGNIFICANT CHANGE UP (ref 0–0)
PHOSPHATE SERPL-MCNC: 3.7 MG/DL — SIGNIFICANT CHANGE UP (ref 2.5–4.5)
PHOSPHATE SERPL-MCNC: 3.7 MG/DL — SIGNIFICANT CHANGE UP (ref 2.5–4.5)
PLATELET # BLD AUTO: 109 K/UL — LOW (ref 150–400)
PLATELET # BLD AUTO: 109 K/UL — LOW (ref 150–400)
POTASSIUM SERPL-MCNC: 5.1 MMOL/L — SIGNIFICANT CHANGE UP (ref 3.5–5.3)
POTASSIUM SERPL-MCNC: 5.1 MMOL/L — SIGNIFICANT CHANGE UP (ref 3.5–5.3)
POTASSIUM SERPL-MCNC: 5.6 MMOL/L — HIGH (ref 3.5–5.3)
POTASSIUM SERPL-MCNC: 5.6 MMOL/L — HIGH (ref 3.5–5.3)
POTASSIUM SERPL-MCNC: 5.7 MMOL/L — HIGH (ref 3.5–5.3)
POTASSIUM SERPL-MCNC: 5.7 MMOL/L — HIGH (ref 3.5–5.3)
POTASSIUM SERPL-SCNC: 5.1 MMOL/L — SIGNIFICANT CHANGE UP (ref 3.5–5.3)
POTASSIUM SERPL-SCNC: 5.1 MMOL/L — SIGNIFICANT CHANGE UP (ref 3.5–5.3)
POTASSIUM SERPL-SCNC: 5.6 MMOL/L — HIGH (ref 3.5–5.3)
POTASSIUM SERPL-SCNC: 5.6 MMOL/L — HIGH (ref 3.5–5.3)
POTASSIUM SERPL-SCNC: 5.7 MMOL/L — HIGH (ref 3.5–5.3)
POTASSIUM SERPL-SCNC: 5.7 MMOL/L — HIGH (ref 3.5–5.3)
RBC # BLD: 2.7 M/UL — LOW (ref 4.2–5.8)
RBC # BLD: 2.7 M/UL — LOW (ref 4.2–5.8)
RBC # FLD: 13.7 % — SIGNIFICANT CHANGE UP (ref 10.3–14.5)
RBC # FLD: 13.7 % — SIGNIFICANT CHANGE UP (ref 10.3–14.5)
SODIUM SERPL-SCNC: 134 MMOL/L — LOW (ref 135–145)
SODIUM SERPL-SCNC: 135 MMOL/L — SIGNIFICANT CHANGE UP (ref 135–145)
SODIUM SERPL-SCNC: 135 MMOL/L — SIGNIFICANT CHANGE UP (ref 135–145)
WBC # BLD: 3.15 K/UL — LOW (ref 3.8–10.5)
WBC # BLD: 3.15 K/UL — LOW (ref 3.8–10.5)
WBC # FLD AUTO: 3.15 K/UL — LOW (ref 3.8–10.5)
WBC # FLD AUTO: 3.15 K/UL — LOW (ref 3.8–10.5)

## 2024-01-12 PROCEDURE — 99232 SBSQ HOSP IP/OBS MODERATE 35: CPT

## 2024-01-12 PROCEDURE — 99233 SBSQ HOSP IP/OBS HIGH 50: CPT | Mod: GC

## 2024-01-12 RX ORDER — FUROSEMIDE 40 MG
60 TABLET ORAL ONCE
Refills: 0 | Status: COMPLETED | OUTPATIENT
Start: 2024-01-12 | End: 2024-01-12

## 2024-01-12 RX ORDER — SODIUM ZIRCONIUM CYCLOSILICATE 10 G/10G
10 POWDER, FOR SUSPENSION ORAL ONCE
Refills: 0 | Status: COMPLETED | OUTPATIENT
Start: 2024-01-12 | End: 2024-01-12

## 2024-01-12 RX ORDER — SODIUM ZIRCONIUM CYCLOSILICATE 10 G/10G
10 POWDER, FOR SUSPENSION ORAL THREE TIMES A DAY
Refills: 0 | Status: COMPLETED | OUTPATIENT
Start: 2024-01-12 | End: 2024-01-14

## 2024-01-12 RX ORDER — CALCIUM GLUCONATE 100 MG/ML
1 VIAL (ML) INTRAVENOUS ONCE
Refills: 0 | Status: COMPLETED | OUTPATIENT
Start: 2024-01-12 | End: 2024-01-12

## 2024-01-12 RX ADMIN — DIVALPROEX SODIUM 500 MILLIGRAM(S): 500 TABLET, DELAYED RELEASE ORAL at 05:23

## 2024-01-12 RX ADMIN — SODIUM ZIRCONIUM CYCLOSILICATE 10 GRAM(S): 10 POWDER, FOR SUSPENSION ORAL at 17:14

## 2024-01-12 RX ADMIN — ISOSORBIDE MONONITRATE 30 MILLIGRAM(S): 60 TABLET, EXTENDED RELEASE ORAL at 12:39

## 2024-01-12 RX ADMIN — Medication 25 MILLIGRAM(S): at 12:39

## 2024-01-12 RX ADMIN — FINASTERIDE 5 MILLIGRAM(S): 5 TABLET, FILM COATED ORAL at 12:41

## 2024-01-12 RX ADMIN — Medication 100 MILLIGRAM(S): at 05:22

## 2024-01-12 RX ADMIN — Medication 1300 MILLIGRAM(S): at 12:39

## 2024-01-12 RX ADMIN — HALOPERIDOL DECANOATE 2 MILLIGRAM(S): 100 INJECTION INTRAMUSCULAR at 21:58

## 2024-01-12 RX ADMIN — DIVALPROEX SODIUM 500 MILLIGRAM(S): 500 TABLET, DELAYED RELEASE ORAL at 17:14

## 2024-01-12 RX ADMIN — Medication 1: at 12:53

## 2024-01-12 RX ADMIN — TAMSULOSIN HYDROCHLORIDE 0.4 MILLIGRAM(S): 0.4 CAPSULE ORAL at 21:57

## 2024-01-12 RX ADMIN — Medication 1300 MILLIGRAM(S): at 21:58

## 2024-01-12 RX ADMIN — Medication 1300 MILLIGRAM(S): at 05:22

## 2024-01-12 RX ADMIN — INSULIN GLARGINE 8 UNIT(S): 100 INJECTION, SOLUTION SUBCUTANEOUS at 21:57

## 2024-01-12 RX ADMIN — Medication 100 MILLIGRAM(S): at 21:58

## 2024-01-12 RX ADMIN — Medication 25 MILLIGRAM(S): at 05:22

## 2024-01-12 RX ADMIN — Medication 60 MILLIGRAM(S): at 16:09

## 2024-01-12 RX ADMIN — Medication 100 GRAM(S): at 14:05

## 2024-01-12 RX ADMIN — SODIUM ZIRCONIUM CYCLOSILICATE 10 GRAM(S): 10 POWDER, FOR SUSPENSION ORAL at 09:14

## 2024-01-12 RX ADMIN — Medication 25 MILLIGRAM(S): at 21:58

## 2024-01-12 RX ADMIN — CLOPIDOGREL BISULFATE 75 MILLIGRAM(S): 75 TABLET, FILM COATED ORAL at 12:40

## 2024-01-12 NOTE — PROGRESS NOTE ADULT - ATTENDING COMMENTS
remains agitated    #CKD  Stage V, known since last admission December, [facility]  stable  #met acidosis  cont sodium bicarb tabs  now on 1300mg po tid  he refused afternoon doses yesterday  #hyperkalemia  low k diet  lokelma 10g tid today  check ekg ( prior ekg and today with hyperacute t waves only two leads v2, v3) doubt hyperk related but repeat ekg at time of next lab draw  Lasix 60mg IV now  repeat k at 6 pm today   d/w NP 2:20pm

## 2024-01-12 NOTE — BH CONSULTATION LIAISON PROGRESS NOTE - NSBHFUPINTERVALHXFT_PSY_A_CORE
Pt. seen and evaluated, staff consulted, chart, labs, meds reviewed. Patient still remains intermittently irritable, but less and less confrontational and combative than during prior days, also wants to walk around. Patient maintains a steady improvement in clarity of sensorium. Denies SI/HI.    As per staff, patient intermittently refusing meds.
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
## 2024-01-12 NOTE — PROGRESS NOTE ADULT - PROBLEM SELECTOR PLAN 3
Low serum bicarb- continue sodium bicarb to 1300 TID.    Discussed with primary team.    If you have any questions, please feel free to contact me  Lukas Espitia  Nephrology Fellow  [phone]; Prefer [employer] TEAMS  (After 5pm or on weekends please page the on-call fellow). Low serum bicarb- continue sodium bicarb to 1300 TID.    Discussed with primary team.    If you have any questions, please feel free to contact me  Lukas Espitia  Nephrology Fellow  [phone]; Prefer [employer] TEAMS  (After 5pm or on weekends please page the on-call fellow).
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
## 2024-01-12 NOTE — PROGRESS NOTE ADULT - ASSESSMENT
77 yo male h/o schizophrenia, dementia, agitation, ckd, dm, htn, presenting to ER with agitation    schizophrenia/dementia/agitation  psy following  plan for inpt psy admission 2PC  psy meds as per psy  d/w psych attending    pt needs constant observation and will be 2pc to inpt psy facility     ckd  stable   creat at baseline  pt makes urine  no need for urgent HD  lokelma given for hyperKalemia today  to f/u with outpt nephrologist as noted in nephrology consult note from 12/27/23  renal f/u noted  no plan for dialysis at this time. pt will need weekly bmp. as long as creat remains stable and bmp wnl then no plans to start HD.    will need routine f/u with outpt nephrologist     hyperkalemia  lokelma as ordered  calcium ordered  f/u repeat bmp  renal diet     HTN  resume outpt bp meds    anemia  chronic  due to ckd  iron supp  monitor     dm  lantus and iss   monitor fs           Advanced care planning was discussed with patient and family.  Advanced care planning forms were reviewed and discussed as appropriate.  Differential diagnosis and plan of care discussed with patient after the evaluation.   Pain assessed and judicious use of narcotics when appropriate was discussed.  Importance of Fall prevention discussed.  Counseling on Smoking and Alcohol cessation was offered when appropriate.  Counseling on Diet, exercise, and medication compliance was done.       Approx 75 minutes spent.

## 2024-01-12 NOTE — BH CONSULTATION LIAISON PROGRESS NOTE - NSBHCHARTREVIEWINVESTIGATE_PSY_A_CORE FT
< from: 12 Lead ECG (01.04.24 @ 23:51) >      Ventricular Rate 69 BPM    Atrial Rate 69 BPM    P-R Interval 160 ms    QRS Duration 76 ms    Q-T Interval 398 ms    QTC Calculation(Bazett) 426 ms    P Axis 63 degrees    R Axis 72 degrees    T Axis 52 degrees    Diagnosis Line BASELINE ARTIFACT  NORMAL SINUS RHYTHM  NORMAL ECG  WHEN COMPARED WITH ECG OF 04-JAN-2024 12:45, (UNCONFIRMED)  NO SIGNIFICANT CHANGE WAS FOUND  Confirmed by MD Minh, Mukesh ([id number]) on 1/5/2024 7:37:24 PM    < end of copied text > < from: 12 Lead ECG (01.04.24 @ 23:51) >      Ventricular Rate 69 BPM    Atrial Rate 69 BPM    P-R Interval 160 ms    QRS Duration 76 ms    Q-T Interval 398 ms    QTC Calculation(Bazett) 426 ms    P Axis 63 degrees    R Axis 72 degrees    T Axis 52 degrees    Diagnosis Line BASELINE ARTIFACT  NORMAL SINUS RHYTHM  NORMAL ECG  WHEN COMPARED WITH ECG OF 04-JAN-2024 12:45, (UNCONFIRMED)  NO SIGNIFICANT CHANGE WAS FOUND  Confirmed by MD Minh, Mukesh ([id number]) on 1/5/2024 7:37:24 PM    < end of copied text >

## 2024-01-12 NOTE — CHART NOTE - NSCHARTNOTEFT_GEN_A_CORE
Patient noted to have a potassium of 5.7.  Discussed with renal, Dr. Virk - patient ordered for Lokelma 10gm x 1.  EKG done and reviewed with nephrology and medical attending - appears unchanged from previous EKG.  Bladder scan shows 134cc.  Calcium Gluconate 1gm IVP x 1 ordered.  Repeat potassium is 5.6 - discussed with renal, Dr. Virk who recommended ordering lokelma 10gm TID x 48 hours, lasix 60mg IVP x 1, a repeat BMP at 6pm, and a repeat EKG at 6pm.  Discussed with Dr. Granger, who is in agreement with the above plan and states that patient does not need telemetry monitoring at this time.  Will follow up BMP and EKG at 6pm.

## 2024-01-12 NOTE — PROGRESS NOTE ADULT - PROBLEM SELECTOR PLAN 1
Pt with chronic kidney disease, known to be stage 5 from uncontrolled HTN. Was recently admitted to [facility] for similar reasons and noted to have a Scr of 5.04. Dr. Escobar saw patient while he was here and discussed with sister Eli Okeefe today  and Ms Cedillo at the [facility]- he attends his appointments regularly. Now here with BUN/Cr of 90/5 on (1/8) stable from last admission.    SCr stable at ~5.5, with slight increase in BUN to 95. Please monitor serial bladder scan to ensure no retention. Ensure renal restricted diet. Pt with chronic kidney disease, known to be stage 5 from uncontrolled HTN. Was recently admitted to [facility] for similar reasons and noted to have a Scr of 5.04. Dr. Escobar saw patient while he was here and discussed with sister Eli Okeefe today  and Ms Cedillo at the [facility]- he attends his appointments regularly. Now here with BUN/Cr of 90/5 on (1/8) stable from last admission.    SCr stable at ~5.5, with slight increase in BUN to 95. Please monitor serial bladder scan to ensure no retention. Ensure renal restricted diet.

## 2024-01-12 NOTE — PROGRESS NOTE ADULT - PROBLEM SELECTOR PLAN 2
Elevated potassium t 5.7 today. Please order Lokelma 10 daily for now. Will consider adding a loop diuretic as well. Obtain EKG and obtain bladder scan. Ensure low potassium diet. Maintain on Tele Elevated potassium t 5.7 today. Please order Lokelma 10 daily for now. Will consider adding a loop diuretic as well. Obtain EKG and obtain bladder scan. Ensure low potassium diet. Maintain on Tele. Repeat K in afternoon and an EKG

## 2024-01-12 NOTE — PROGRESS NOTE ADULT - SUBJECTIVE AND OBJECTIVE BOX
[facility] Division of Kidney Diseases & Hypertension  FOLLOW UP NOTE  [phone]--------------------------------------------------------------------------------    Chief Complaint: Advanced CKD    24 hour events/subjective:  Patient seen and examined at bedside.  Renal function stable, but patient is becoming persistently hyperkalemic. Denies any CP, SOB, difficulty with urination      PAST HISTORY  --------------------------------------------------------------------------------  No significant changes to PMH, PSH, FHx, SHx, unless otherwise noted    ALLERGIES & MEDICATIONS  --------------------------------------------------------------------------------  Allergies    amlodipine (Other)  IV Contrast (Other)  Haldol (Other)    Intolerances      Standing Inpatient Medications  clopidogrel Tablet 75 milliGRAM(s) Oral daily  dextrose 5%. 1000 milliLiter(s) IV Continuous <Continuous>  dextrose 5%. 1000 milliLiter(s) IV Continuous <Continuous>  dextrose 50% Injectable 12.5 Gram(s) IV Push once  dextrose 50% Injectable 25 Gram(s) IV Push once  dextrose 50% Injectable 25 Gram(s) IV Push once  divalproex  milliGRAM(s) Oral two times a day  finasteride 5 milliGRAM(s) Oral daily  glipiZIDE. 5 milliGRAM(s) Oral daily  glucagon  Injectable 1 milliGRAM(s) IntraMuscular once  haloperidol     Tablet 2 milliGRAM(s) Oral at bedtime  hydrALAZINE 25 milliGRAM(s) Oral three times a day  insulin glargine Injectable (LANTUS) 8 Unit(s) SubCutaneous at bedtime  insulin lispro (ADMELOG) corrective regimen sliding scale   SubCutaneous three times a day before meals  isosorbide   mononitrate ER Tablet (IMDUR) 30 milliGRAM(s) Oral daily  metoprolol tartrate 100 milliGRAM(s) Oral daily  sodium bicarbonate 1300 milliGRAM(s) Oral three times a day  sodium zirconium cyclosilicate 10 Gram(s) Oral once  tamsulosin 0.4 milliGRAM(s) Oral at bedtime  traZODone 100 milliGRAM(s) Oral at bedtime    PRN Inpatient Medications  dextrose Oral Gel 15 Gram(s) Oral once PRN  haloperidol    Injectable 2 milliGRAM(s) IV Push every 6 hours PRN      REVIEW OF SYSTEMS  --------------------------------------------------------------------------------  per above    VITALS/PHYSICAL EXAM  --------------------------------------------------------------------------------  T(C): 36.9 (01-12-24 @ 04:03), Max: 36.9 (01-12-24 @ 04:03)  HR: 86 (01-12-24 @ 04:03) (82 - 86)  BP: 154/64 (01-12-24 @ 04:03) (124/63 - 154/64)  RR: 18 (01-12-24 @ 04:03) (18 - 18)  SpO2: 100% (01-12-24 @ 04:03) (98% - 100%)  Wt(kg): --        01-11-24 @ 07:01  -  01-12-24 @ 07:00  --------------------------------------------------------  IN: 480 mL / OUT: 300 mL / NET: 180 mL      Physical Exam:  General: no acute distress  Neuro: no focal deficits  HEENT: NC/AT, anicteric, no JVD  Pulmonary: lungs CTA B/L  Cardiovascular/Chest: +S1S2, RRR  GI/Abdomen: soft, NT/ND, +bowel sounds  Extremities: 1+ LE edema  Skin: Warm and dry    LABS/STUDIES  --------------------------------------------------------------------------------              7.6    3.15  >-----------<  109      [01-12-24 @ 06:41]              24.0     134  |  103  |  95  ----------------------------<  85      [01-12-24 @ 06:38]  5.7   |  19  |  5.34        Ca     8.5     [01-12-24 @ 06:38]      Mg     1.7     [01-12-24 @ 06:38]      Phos  3.7     [01-12-24 @ 06:38]            Creatinine Trend:  SCr 5.34 [01-12 @ 06:38]  SCr 5.41 [01-11 @ 07:03]  SCr 5.55 [01-08 @ 16:11]  SCr 5.01 [01-05 @ 07:25]  SCr 4.80 [01-05 @ 05:17]    Urinalysis - [01-12-24 @ 06:38]      Color  / Appearance  / SG  / pH       Gluc 85 / Ketone   / Bili  / Urobili        Blood  / Protein  / Leuk Est  / Nitrite       RBC  / WBC  / Hyaline  / Gran  / Sq Epi  / Non Sq Epi  / Bacteria            [facility] Division of Kidney Diseases & Hypertension  FOLLOW UP NOTE  [phone]--------------------------------------------------------------------------------    Chief Complaint: Advanced CKD    24 hour events/subjective:  Patient seen and examined at bedside.  Renal function stable, but patient is becoming persistently hyperkalemic. Denies any CP, SOB, difficulty with urination      PAST HISTORY  --------------------------------------------------------------------------------  No significant changes to PMH, PSH, FHx, SHx, unless otherwise noted    ALLERGIES & MEDICATIONS  --------------------------------------------------------------------------------  Allergies    amlodipine (Other)  IV Contrast (Other)  Haldol (Other)    Intolerances      Standing Inpatient Medications  clopidogrel Tablet 75 milliGRAM(s) Oral daily  dextrose 5%. 1000 milliLiter(s) IV Continuous <Continuous>  dextrose 5%. 1000 milliLiter(s) IV Continuous <Continuous>  dextrose 50% Injectable 12.5 Gram(s) IV Push once  dextrose 50% Injectable 25 Gram(s) IV Push once  dextrose 50% Injectable 25 Gram(s) IV Push once  divalproex  milliGRAM(s) Oral two times a day  finasteride 5 milliGRAM(s) Oral daily  glipiZIDE. 5 milliGRAM(s) Oral daily  glucagon  Injectable 1 milliGRAM(s) IntraMuscular once  haloperidol     Tablet 2 milliGRAM(s) Oral at bedtime  hydrALAZINE 25 milliGRAM(s) Oral three times a day  insulin glargine Injectable (LANTUS) 8 Unit(s) SubCutaneous at bedtime  insulin lispro (ADMELOG) corrective regimen sliding scale   SubCutaneous three times a day before meals  isosorbide   mononitrate ER Tablet (IMDUR) 30 milliGRAM(s) Oral daily  metoprolol tartrate 100 milliGRAM(s) Oral daily  sodium bicarbonate 1300 milliGRAM(s) Oral three times a day  sodium zirconium cyclosilicate 10 Gram(s) Oral once  tamsulosin 0.4 milliGRAM(s) Oral at bedtime  traZODone 100 milliGRAM(s) Oral at bedtime    PRN Inpatient Medications  dextrose Oral Gel 15 Gram(s) Oral once PRN  haloperidol    Injectable 2 milliGRAM(s) IV Push every 6 hours PRN      REVIEW OF SYSTEMS  --------------------------------------------------------------------------------  per above    VITALS/PHYSICAL EXAM  --------------------------------------------------------------------------------  T(C): 36.9 (01-12-24 @ 04:03), Max: 36.9 (01-12-24 @ 04:03)  HR: 86 (01-12-24 @ 04:03) (82 - 86)  BP: 154/64 (01-12-24 @ 04:03) (124/63 - 154/64)  RR: 18 (01-12-24 @ 04:03) (18 - 18)  SpO2: 100% (01-12-24 @ 04:03) (98% - 100%)  Wt(kg): --        01-11-24 @ 07:01  -  01-12-24 @ 07:00  --------------------------------------------------------  IN: 480 mL / OUT: 300 mL / NET: 180 mL      Physical Exam:  General: no acute distress  Neuro: no focal deficits  HEENT: NC/AT, anicteric, no JVD  Pulmonary: lungs CTA B/L  Cardiovascular/Chest: +S1S2, RRR  GI/Abdomen: soft, NT/ND, +bowel sounds  Extremities: 1+ LE edema  Skin: Warm and dry    LABS/STUDIES  --------------------------------------------------------------------------------              7.6    3.15  >-----------<  109      [01-12-24 @ 06:41]              24.0     134  |  103  |  95  ----------------------------<  85      [01-12-24 @ 06:38]  5.7   |  19  |  5.34        Ca     8.5     [01-12-24 @ 06:38]      Mg     1.7     [01-12-24 @ 06:38]      Phos  3.7     [01-12-24 @ 06:38]            Creatinine Trend:  SCr 5.34 [01-12 @ 06:38]  SCr 5.41 [01-11 @ 07:03]  SCr 5.55 [01-08 @ 16:11]  SCr 5.01 [01-05 @ 07:25]  SCr 4.80 [01-05 @ 05:17]    Urinalysis - [01-12-24 @ 06:38]      Color  / Appearance  / SG  / pH       Gluc 85 / Ketone   / Bili  / Urobili        Blood  / Protein  / Leuk Est  / Nitrite       RBC  / WBC  / Hyaline  / Gran  / Sq Epi  / Non Sq Epi  / Bacteria

## 2024-01-13 LAB
ANION GAP SERPL CALC-SCNC: 12 MMOL/L — SIGNIFICANT CHANGE UP (ref 5–17)
ANION GAP SERPL CALC-SCNC: 12 MMOL/L — SIGNIFICANT CHANGE UP (ref 5–17)
BUN SERPL-MCNC: 95 MG/DL — HIGH (ref 7–23)
BUN SERPL-MCNC: 95 MG/DL — HIGH (ref 7–23)
CALCIUM SERPL-MCNC: 8.4 MG/DL — SIGNIFICANT CHANGE UP (ref 8.4–10.5)
CALCIUM SERPL-MCNC: 8.4 MG/DL — SIGNIFICANT CHANGE UP (ref 8.4–10.5)
CHLORIDE SERPL-SCNC: 104 MMOL/L — SIGNIFICANT CHANGE UP (ref 96–108)
CHLORIDE SERPL-SCNC: 104 MMOL/L — SIGNIFICANT CHANGE UP (ref 96–108)
CO2 SERPL-SCNC: 19 MMOL/L — LOW (ref 22–31)
CO2 SERPL-SCNC: 19 MMOL/L — LOW (ref 22–31)
CREAT SERPL-MCNC: 5.27 MG/DL — HIGH (ref 0.5–1.3)
CREAT SERPL-MCNC: 5.27 MG/DL — HIGH (ref 0.5–1.3)
EGFR: 11 ML/MIN/1.73M2 — LOW
EGFR: 11 ML/MIN/1.73M2 — LOW
GLUCOSE BLDC GLUCOMTR-MCNC: 126 MG/DL — HIGH (ref 70–99)
GLUCOSE BLDC GLUCOMTR-MCNC: 126 MG/DL — HIGH (ref 70–99)
GLUCOSE BLDC GLUCOMTR-MCNC: 171 MG/DL — HIGH (ref 70–99)
GLUCOSE BLDC GLUCOMTR-MCNC: 171 MG/DL — HIGH (ref 70–99)
GLUCOSE BLDC GLUCOMTR-MCNC: 215 MG/DL — HIGH (ref 70–99)
GLUCOSE BLDC GLUCOMTR-MCNC: 215 MG/DL — HIGH (ref 70–99)
GLUCOSE BLDC GLUCOMTR-MCNC: 221 MG/DL — HIGH (ref 70–99)
GLUCOSE BLDC GLUCOMTR-MCNC: 221 MG/DL — HIGH (ref 70–99)
GLUCOSE SERPL-MCNC: 27 MG/DL — CRITICAL LOW (ref 70–99)
GLUCOSE SERPL-MCNC: 27 MG/DL — CRITICAL LOW (ref 70–99)
HCT VFR BLD CALC: 23.3 % — LOW (ref 39–50)
HCT VFR BLD CALC: 23.3 % — LOW (ref 39–50)
HGB BLD-MCNC: 7.3 G/DL — LOW (ref 13–17)
HGB BLD-MCNC: 7.3 G/DL — LOW (ref 13–17)
MAGNESIUM SERPL-MCNC: 1.5 MG/DL — LOW (ref 1.6–2.6)
MAGNESIUM SERPL-MCNC: 1.5 MG/DL — LOW (ref 1.6–2.6)
MCHC RBC-ENTMCNC: 28 PG — SIGNIFICANT CHANGE UP (ref 27–34)
MCHC RBC-ENTMCNC: 28 PG — SIGNIFICANT CHANGE UP (ref 27–34)
MCHC RBC-ENTMCNC: 31.3 GM/DL — LOW (ref 32–36)
MCHC RBC-ENTMCNC: 31.3 GM/DL — LOW (ref 32–36)
MCV RBC AUTO: 89.3 FL — SIGNIFICANT CHANGE UP (ref 80–100)
MCV RBC AUTO: 89.3 FL — SIGNIFICANT CHANGE UP (ref 80–100)
NRBC # BLD: 0 /100 WBCS — SIGNIFICANT CHANGE UP (ref 0–0)
NRBC # BLD: 0 /100 WBCS — SIGNIFICANT CHANGE UP (ref 0–0)
PHOSPHATE SERPL-MCNC: 3.8 MG/DL — SIGNIFICANT CHANGE UP (ref 2.5–4.5)
PHOSPHATE SERPL-MCNC: 3.8 MG/DL — SIGNIFICANT CHANGE UP (ref 2.5–4.5)
PLATELET # BLD AUTO: 101 K/UL — LOW (ref 150–400)
PLATELET # BLD AUTO: 101 K/UL — LOW (ref 150–400)
POTASSIUM SERPL-MCNC: 5 MMOL/L — SIGNIFICANT CHANGE UP (ref 3.5–5.3)
POTASSIUM SERPL-MCNC: 5 MMOL/L — SIGNIFICANT CHANGE UP (ref 3.5–5.3)
POTASSIUM SERPL-SCNC: 5 MMOL/L — SIGNIFICANT CHANGE UP (ref 3.5–5.3)
POTASSIUM SERPL-SCNC: 5 MMOL/L — SIGNIFICANT CHANGE UP (ref 3.5–5.3)
RBC # BLD: 2.61 M/UL — LOW (ref 4.2–5.8)
RBC # BLD: 2.61 M/UL — LOW (ref 4.2–5.8)
RBC # FLD: 13.7 % — SIGNIFICANT CHANGE UP (ref 10.3–14.5)
RBC # FLD: 13.7 % — SIGNIFICANT CHANGE UP (ref 10.3–14.5)
SODIUM SERPL-SCNC: 135 MMOL/L — SIGNIFICANT CHANGE UP (ref 135–145)
SODIUM SERPL-SCNC: 135 MMOL/L — SIGNIFICANT CHANGE UP (ref 135–145)
WBC # BLD: 3.87 K/UL — SIGNIFICANT CHANGE UP (ref 3.8–10.5)
WBC # BLD: 3.87 K/UL — SIGNIFICANT CHANGE UP (ref 3.8–10.5)
WBC # FLD AUTO: 3.87 K/UL — SIGNIFICANT CHANGE UP (ref 3.8–10.5)
WBC # FLD AUTO: 3.87 K/UL — SIGNIFICANT CHANGE UP (ref 3.8–10.5)

## 2024-01-13 PROCEDURE — 99231 SBSQ HOSP IP/OBS SF/LOW 25: CPT

## 2024-01-13 PROCEDURE — 93010 ELECTROCARDIOGRAM REPORT: CPT

## 2024-01-13 RX ADMIN — ISOSORBIDE MONONITRATE 30 MILLIGRAM(S): 60 TABLET, EXTENDED RELEASE ORAL at 12:24

## 2024-01-13 RX ADMIN — INSULIN GLARGINE 8 UNIT(S): 100 INJECTION, SOLUTION SUBCUTANEOUS at 22:16

## 2024-01-13 RX ADMIN — SODIUM ZIRCONIUM CYCLOSILICATE 10 GRAM(S): 10 POWDER, FOR SUSPENSION ORAL at 06:02

## 2024-01-13 RX ADMIN — SODIUM ZIRCONIUM CYCLOSILICATE 10 GRAM(S): 10 POWDER, FOR SUSPENSION ORAL at 12:24

## 2024-01-13 RX ADMIN — Medication 100 MILLIGRAM(S): at 21:45

## 2024-01-13 RX ADMIN — Medication 1300 MILLIGRAM(S): at 06:03

## 2024-01-13 RX ADMIN — Medication 1: at 17:54

## 2024-01-13 RX ADMIN — DIVALPROEX SODIUM 500 MILLIGRAM(S): 500 TABLET, DELAYED RELEASE ORAL at 17:54

## 2024-01-13 RX ADMIN — Medication 25 MILLIGRAM(S): at 06:03

## 2024-01-13 RX ADMIN — Medication 100 MILLIGRAM(S): at 06:03

## 2024-01-13 RX ADMIN — DIVALPROEX SODIUM 500 MILLIGRAM(S): 500 TABLET, DELAYED RELEASE ORAL at 06:03

## 2024-01-13 RX ADMIN — SODIUM ZIRCONIUM CYCLOSILICATE 10 GRAM(S): 10 POWDER, FOR SUSPENSION ORAL at 21:51

## 2024-01-13 RX ADMIN — FINASTERIDE 5 MILLIGRAM(S): 5 TABLET, FILM COATED ORAL at 12:25

## 2024-01-13 RX ADMIN — Medication 1300 MILLIGRAM(S): at 21:45

## 2024-01-13 RX ADMIN — Medication 25 MILLIGRAM(S): at 12:24

## 2024-01-13 RX ADMIN — HALOPERIDOL DECANOATE 2 MILLIGRAM(S): 100 INJECTION INTRAMUSCULAR at 21:45

## 2024-01-13 RX ADMIN — Medication 25 MILLIGRAM(S): at 22:16

## 2024-01-13 RX ADMIN — TAMSULOSIN HYDROCHLORIDE 0.4 MILLIGRAM(S): 0.4 CAPSULE ORAL at 21:45

## 2024-01-13 RX ADMIN — CLOPIDOGREL BISULFATE 75 MILLIGRAM(S): 75 TABLET, FILM COATED ORAL at 12:24

## 2024-01-13 RX ADMIN — Medication 1300 MILLIGRAM(S): at 12:24
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
## 2024-01-13 NOTE — PROGRESS NOTE ADULT - ASSESSMENT
77 yo male h/o schizophrenia, dementia, agitation, ckd, dm, htn, presenting to ER with agitation    schizophrenia/dementia/agitation  psy following  plan for inpt psy admission 2PC  psy meds as per psy  d/w psych attending    pt needs constant observation and will be 2pc to inpt psy facility     ckd  stable   creat at baseline  pt makes urine  no need for urgent HD  lokelma given for hyperKalemia today  to f/u with outpt nephrologist as noted in nephrology consult note from 12/27/23  renal f/u noted  no plan for dialysis at this time. pt will need weekly bmp. as long as creat remains stable and bmp wnl then no plans to start HD.    will need routine f/u with outpt nephrologist     hyperkalemia  s/p lokelma, calcium, lasix  potassium improved  cont monitor    HTN  resume outpt bp meds    anemia  chronic  due to ckd  iron supp  monitor     dm  lantus and iss   monitor fs           Advanced care planning was discussed with patient and family.  Advanced care planning forms were reviewed and discussed as appropriate.  Differential diagnosis and plan of care discussed with patient after the evaluation.   Pain assessed and judicious use of narcotics when appropriate was discussed.  Importance of Fall prevention discussed.  Counseling on Smoking and Alcohol cessation was offered when appropriate.  Counseling on Diet, exercise, and medication compliance was done.       Approx 75 minutes spent.

## 2024-01-14 LAB
ANION GAP SERPL CALC-SCNC: 13 MMOL/L — SIGNIFICANT CHANGE UP (ref 5–17)
ANION GAP SERPL CALC-SCNC: 13 MMOL/L — SIGNIFICANT CHANGE UP (ref 5–17)
BLD GP AB SCN SERPL QL: NEGATIVE — SIGNIFICANT CHANGE UP
BLD GP AB SCN SERPL QL: NEGATIVE — SIGNIFICANT CHANGE UP
BUN SERPL-MCNC: 84 MG/DL — HIGH (ref 7–23)
BUN SERPL-MCNC: 84 MG/DL — HIGH (ref 7–23)
CALCIUM SERPL-MCNC: 7.9 MG/DL — LOW (ref 8.4–10.5)
CALCIUM SERPL-MCNC: 7.9 MG/DL — LOW (ref 8.4–10.5)
CHLORIDE SERPL-SCNC: 101 MMOL/L — SIGNIFICANT CHANGE UP (ref 96–108)
CHLORIDE SERPL-SCNC: 101 MMOL/L — SIGNIFICANT CHANGE UP (ref 96–108)
CO2 SERPL-SCNC: 22 MMOL/L — SIGNIFICANT CHANGE UP (ref 22–31)
CO2 SERPL-SCNC: 22 MMOL/L — SIGNIFICANT CHANGE UP (ref 22–31)
CREAT SERPL-MCNC: 5.47 MG/DL — HIGH (ref 0.5–1.3)
CREAT SERPL-MCNC: 5.47 MG/DL — HIGH (ref 0.5–1.3)
EGFR: 10 ML/MIN/1.73M2 — LOW
EGFR: 10 ML/MIN/1.73M2 — LOW
GLUCOSE BLDC GLUCOMTR-MCNC: 137 MG/DL — HIGH (ref 70–99)
GLUCOSE BLDC GLUCOMTR-MCNC: 137 MG/DL — HIGH (ref 70–99)
GLUCOSE BLDC GLUCOMTR-MCNC: 149 MG/DL — HIGH (ref 70–99)
GLUCOSE BLDC GLUCOMTR-MCNC: 149 MG/DL — HIGH (ref 70–99)
GLUCOSE BLDC GLUCOMTR-MCNC: 152 MG/DL — HIGH (ref 70–99)
GLUCOSE BLDC GLUCOMTR-MCNC: 152 MG/DL — HIGH (ref 70–99)
GLUCOSE BLDC GLUCOMTR-MCNC: 153 MG/DL — HIGH (ref 70–99)
GLUCOSE BLDC GLUCOMTR-MCNC: 153 MG/DL — HIGH (ref 70–99)
GLUCOSE BLDC GLUCOMTR-MCNC: 198 MG/DL — HIGH (ref 70–99)
GLUCOSE BLDC GLUCOMTR-MCNC: 198 MG/DL — HIGH (ref 70–99)
GLUCOSE BLDC GLUCOMTR-MCNC: 54 MG/DL — CRITICAL LOW (ref 70–99)
GLUCOSE BLDC GLUCOMTR-MCNC: 54 MG/DL — CRITICAL LOW (ref 70–99)
GLUCOSE BLDC GLUCOMTR-MCNC: 55 MG/DL — LOW (ref 70–99)
GLUCOSE BLDC GLUCOMTR-MCNC: 55 MG/DL — LOW (ref 70–99)
GLUCOSE BLDC GLUCOMTR-MCNC: 76 MG/DL — SIGNIFICANT CHANGE UP (ref 70–99)
GLUCOSE BLDC GLUCOMTR-MCNC: 76 MG/DL — SIGNIFICANT CHANGE UP (ref 70–99)
GLUCOSE SERPL-MCNC: 117 MG/DL — HIGH (ref 70–99)
GLUCOSE SERPL-MCNC: 117 MG/DL — HIGH (ref 70–99)
HCT VFR BLD CALC: 24.2 % — LOW (ref 39–50)
HCT VFR BLD CALC: 24.2 % — LOW (ref 39–50)
HGB BLD-MCNC: 7.7 G/DL — LOW (ref 13–17)
HGB BLD-MCNC: 7.7 G/DL — LOW (ref 13–17)
MAGNESIUM SERPL-MCNC: 1.6 MG/DL — SIGNIFICANT CHANGE UP (ref 1.6–2.6)
MAGNESIUM SERPL-MCNC: 1.6 MG/DL — SIGNIFICANT CHANGE UP (ref 1.6–2.6)
MCHC RBC-ENTMCNC: 28.5 PG — SIGNIFICANT CHANGE UP (ref 27–34)
MCHC RBC-ENTMCNC: 28.5 PG — SIGNIFICANT CHANGE UP (ref 27–34)
MCHC RBC-ENTMCNC: 31.8 GM/DL — LOW (ref 32–36)
MCHC RBC-ENTMCNC: 31.8 GM/DL — LOW (ref 32–36)
MCV RBC AUTO: 89.6 FL — SIGNIFICANT CHANGE UP (ref 80–100)
MCV RBC AUTO: 89.6 FL — SIGNIFICANT CHANGE UP (ref 80–100)
NRBC # BLD: 0 /100 WBCS — SIGNIFICANT CHANGE UP (ref 0–0)
NRBC # BLD: 0 /100 WBCS — SIGNIFICANT CHANGE UP (ref 0–0)
PHOSPHATE SERPL-MCNC: 4.1 MG/DL — SIGNIFICANT CHANGE UP (ref 2.5–4.5)
PHOSPHATE SERPL-MCNC: 4.1 MG/DL — SIGNIFICANT CHANGE UP (ref 2.5–4.5)
PLATELET # BLD AUTO: 100 K/UL — LOW (ref 150–400)
PLATELET # BLD AUTO: 100 K/UL — LOW (ref 150–400)
POTASSIUM SERPL-MCNC: 4 MMOL/L — SIGNIFICANT CHANGE UP (ref 3.5–5.3)
POTASSIUM SERPL-MCNC: 4 MMOL/L — SIGNIFICANT CHANGE UP (ref 3.5–5.3)
POTASSIUM SERPL-SCNC: 4 MMOL/L — SIGNIFICANT CHANGE UP (ref 3.5–5.3)
POTASSIUM SERPL-SCNC: 4 MMOL/L — SIGNIFICANT CHANGE UP (ref 3.5–5.3)
RBC # BLD: 2.7 M/UL — LOW (ref 4.2–5.8)
RBC # BLD: 2.7 M/UL — LOW (ref 4.2–5.8)
RBC # FLD: 13.9 % — SIGNIFICANT CHANGE UP (ref 10.3–14.5)
RBC # FLD: 13.9 % — SIGNIFICANT CHANGE UP (ref 10.3–14.5)
RH IG SCN BLD-IMP: POSITIVE — SIGNIFICANT CHANGE UP
RH IG SCN BLD-IMP: POSITIVE — SIGNIFICANT CHANGE UP
SODIUM SERPL-SCNC: 136 MMOL/L — SIGNIFICANT CHANGE UP (ref 135–145)
SODIUM SERPL-SCNC: 136 MMOL/L — SIGNIFICANT CHANGE UP (ref 135–145)
WBC # BLD: 3.16 K/UL — LOW (ref 3.8–10.5)
WBC # BLD: 3.16 K/UL — LOW (ref 3.8–10.5)
WBC # FLD AUTO: 3.16 K/UL — LOW (ref 3.8–10.5)
WBC # FLD AUTO: 3.16 K/UL — LOW (ref 3.8–10.5)

## 2024-01-14 PROCEDURE — 99232 SBSQ HOSP IP/OBS MODERATE 35: CPT | Mod: GC

## 2024-01-14 PROCEDURE — 99231 SBSQ HOSP IP/OBS SF/LOW 25: CPT

## 2024-01-14 RX ORDER — INSULIN GLARGINE 100 [IU]/ML
4 INJECTION, SOLUTION SUBCUTANEOUS AT BEDTIME
Refills: 0 | Status: DISCONTINUED | OUTPATIENT
Start: 2024-01-14 | End: 2024-01-07

## 2024-01-14 RX ADMIN — ISOSORBIDE MONONITRATE 30 MILLIGRAM(S): 60 TABLET, EXTENDED RELEASE ORAL at 13:23

## 2024-01-14 RX ADMIN — SODIUM ZIRCONIUM CYCLOSILICATE 10 GRAM(S): 10 POWDER, FOR SUSPENSION ORAL at 13:24

## 2024-01-14 RX ADMIN — TAMSULOSIN HYDROCHLORIDE 0.4 MILLIGRAM(S): 0.4 CAPSULE ORAL at 21:20

## 2024-01-14 RX ADMIN — Medication 25 MILLIGRAM(S): at 13:23

## 2024-01-14 RX ADMIN — Medication 1300 MILLIGRAM(S): at 05:09

## 2024-01-14 RX ADMIN — Medication 1: at 09:08

## 2024-01-14 RX ADMIN — HALOPERIDOL DECANOATE 2 MILLIGRAM(S): 100 INJECTION INTRAMUSCULAR at 09:08

## 2024-01-14 RX ADMIN — DIVALPROEX SODIUM 500 MILLIGRAM(S): 500 TABLET, DELAYED RELEASE ORAL at 05:09

## 2024-01-14 RX ADMIN — SODIUM ZIRCONIUM CYCLOSILICATE 10 GRAM(S): 10 POWDER, FOR SUSPENSION ORAL at 05:09

## 2024-01-14 RX ADMIN — Medication 1300 MILLIGRAM(S): at 21:19

## 2024-01-14 RX ADMIN — Medication 100 MILLIGRAM(S): at 21:21

## 2024-01-14 RX ADMIN — Medication 25 MILLIGRAM(S): at 21:21

## 2024-01-14 RX ADMIN — CLOPIDOGREL BISULFATE 75 MILLIGRAM(S): 75 TABLET, FILM COATED ORAL at 13:23

## 2024-01-14 RX ADMIN — Medication 100 MILLIGRAM(S): at 05:09

## 2024-01-14 RX ADMIN — Medication 25 MILLIGRAM(S): at 05:09

## 2024-01-14 RX ADMIN — Medication 1300 MILLIGRAM(S): at 13:22

## 2024-01-14 RX ADMIN — HALOPERIDOL DECANOATE 2 MILLIGRAM(S): 100 INJECTION INTRAMUSCULAR at 21:19

## 2024-01-14 RX ADMIN — INSULIN GLARGINE 4 UNIT(S): 100 INJECTION, SOLUTION SUBCUTANEOUS at 21:51

## 2024-01-14 RX ADMIN — FINASTERIDE 5 MILLIGRAM(S): 5 TABLET, FILM COATED ORAL at 13:23

## 2024-01-14 RX ADMIN — DIVALPROEX SODIUM 500 MILLIGRAM(S): 500 TABLET, DELAYED RELEASE ORAL at 17:44

## 2024-01-14 NOTE — PROVIDER CONTACT NOTE (HYPOGLYCEMIA EVENT) - NS PROVIDER CONTACT BACKGROUND-HYPO
Age: 78y    Gender: Male    POCT Blood Glucose:  54 mg/dL (01-14-24 @ 12:36)  55 mg/dL (01-14-24 @ 12:35)  198 mg/dL (01-14-24 @ 08:42)  153 mg/dL (01-14-24 @ 02:54)  221 mg/dL (01-13-24 @ 22:12)  171 mg/dL (01-13-24 @ 17:28)      eMAR:  insulin glargine Injectable (LANTUS)   8 Unit(s) SubCutaneous (01-13-24 @ 22:16)    insulin lispro (ADMELOG) corrective regimen sliding scale   1 Unit(s) SubCutaneous (01-14-24 @ 09:08)   1 Unit(s) SubCutaneous (01-13-24 @ 17:54)

## 2024-01-14 NOTE — PROGRESS NOTE ADULT - SUBJECTIVE AND OBJECTIVE BOX
[facility] Division of Kidney Diseases & Hypertension  FOLLOW UP NOTE  [phone]--------------------------------------------------------------------------------    Chief Complaint: Advanced CKD    24 hour events/subjective:  Patient seen and examined at bedside, asking to be discharged.  Renal function stable. Denies any CP, SOB, difficulty with urination      PAST HISTORY  --------------------------------------------------------------------------------  No significant changes to PMH, PSH, FHx, SHx, unless otherwise noted    ALLERGIES & MEDICATIONS  --------------------------------------------------------------------------------  Allergies    amlodipine (Other)  IV Contrast (Other)  Haldol (Other)    Intolerances      Standing Inpatient Medications  clopidogrel Tablet 75 milliGRAM(s) Oral daily  dextrose 5%. 1000 milliLiter(s) IV Continuous <Continuous>  dextrose 5%. 1000 milliLiter(s) IV Continuous <Continuous>  dextrose 50% Injectable 12.5 Gram(s) IV Push once  dextrose 50% Injectable 25 Gram(s) IV Push once  dextrose 50% Injectable 25 Gram(s) IV Push once  divalproex  milliGRAM(s) Oral two times a day  finasteride 5 milliGRAM(s) Oral daily  glipiZIDE. 5 milliGRAM(s) Oral daily  glucagon  Injectable 1 milliGRAM(s) IntraMuscular once  haloperidol     Tablet 2 milliGRAM(s) Oral at bedtime  hydrALAZINE 25 milliGRAM(s) Oral three times a day  insulin glargine Injectable (LANTUS) 8 Unit(s) SubCutaneous at bedtime  insulin lispro (ADMELOG) corrective regimen sliding scale   SubCutaneous three times a day before meals  isosorbide   mononitrate ER Tablet (IMDUR) 30 milliGRAM(s) Oral daily  metoprolol tartrate 100 milliGRAM(s) Oral daily  sodium bicarbonate 1300 milliGRAM(s) Oral three times a day  tamsulosin 0.4 milliGRAM(s) Oral at bedtime  traZODone 100 milliGRAM(s) Oral at bedtime    PRN Inpatient Medications  dextrose Oral Gel 15 Gram(s) Oral once PRN  haloperidol    Injectable 2 milliGRAM(s) IV Push every 6 hours PRN      REVIEW OF SYSTEMS  --------------------------------------------------------------------------------  per above    VITALS/PHYSICAL EXAM  --------------------------------------------------------------------------------  T(C): 36.7 (01-14-24 @ 05:05), Max: 36.9 (01-13-24 @ 14:14)  HR: 66 (01-14-24 @ 12:49) (66 - 84)  BP: 149/70 (01-14-24 @ 12:49) (135/55 - 155/71)  RR: 18 (01-14-24 @ 12:49) (18 - 18)  SpO2: 99% (01-14-24 @ 12:49) (97% - 99%)  Wt(kg): --        01-13-24 @ 07:01  -  01-14-24 @ 07:00  --------------------------------------------------------  IN: 720 mL / OUT: 450 mL / NET: 270 mL      Physical Exam:  General: no acute distress  Neuro: no focal deficits  HEENT: NC/AT, anicteric, no JVD  Pulmonary: lungs CTA B/L  Cardiovascular/Chest: +S1S2, RRR  GI/Abdomen: soft, NT/ND, +bowel sounds  Extremities: No edema   Skin: Warm and dry        LABS/STUDIES  --------------------------------------------------------------------------------              7.7    3.16  >-----------<  100      [01-14-24 @ 07:23]              24.2     136  |  101  |  84  ----------------------------<  117      [01-14-24 @ 07:25]  4.0   |  22  |  5.47        Ca     7.9     [01-14-24 @ 07:25]      Mg     1.6     [01-14-24 @ 07:25]      Phos  4.1     [01-14-24 @ 07:25]      Creatinine Trend:  SCr 5.47 [01-14 @ 07:25]  SCr 5.27 [01-13 @ 07:00]  SCr 5.23 [01-12 @ 18:57]  SCr 5.06 [01-12 @ 12:52]  SCr 5.34 [01-12 @ 06:38]    Urinalysis - [01-14-24 @ 07:25]      Color  / Appearance  / SG  / pH       Gluc 117 / Ketone   / Bili  / Urobili        Blood  / Protein  / Leuk Est  / Nitrite       RBC  / WBC  / Hyaline  / Gran  / Sq Epi  / Non Sq Epi  / Bacteria         HCV 12.81, Reactive      [01-12-24 @ 06:41]     [facility] Division of Kidney Diseases & Hypertension  FOLLOW UP NOTE  [phone]--------------------------------------------------------------------------------    Chief Complaint: Advanced CKD    24 hour events/subjective:  Patient seen and examined at bedside, asking to be discharged.  Renal function stable. Denies any CP, SOB, difficulty with urination      PAST HISTORY  --------------------------------------------------------------------------------  No significant changes to PMH, PSH, FHx, SHx, unless otherwise noted    ALLERGIES & MEDICATIONS  --------------------------------------------------------------------------------  Allergies    amlodipine (Other)  IV Contrast (Other)  Haldol (Other)    Intolerances      Standing Inpatient Medications  clopidogrel Tablet 75 milliGRAM(s) Oral daily  dextrose 5%. 1000 milliLiter(s) IV Continuous <Continuous>  dextrose 5%. 1000 milliLiter(s) IV Continuous <Continuous>  dextrose 50% Injectable 12.5 Gram(s) IV Push once  dextrose 50% Injectable 25 Gram(s) IV Push once  dextrose 50% Injectable 25 Gram(s) IV Push once  divalproex  milliGRAM(s) Oral two times a day  finasteride 5 milliGRAM(s) Oral daily  glipiZIDE. 5 milliGRAM(s) Oral daily  glucagon  Injectable 1 milliGRAM(s) IntraMuscular once  haloperidol     Tablet 2 milliGRAM(s) Oral at bedtime  hydrALAZINE 25 milliGRAM(s) Oral three times a day  insulin glargine Injectable (LANTUS) 8 Unit(s) SubCutaneous at bedtime  insulin lispro (ADMELOG) corrective regimen sliding scale   SubCutaneous three times a day before meals  isosorbide   mononitrate ER Tablet (IMDUR) 30 milliGRAM(s) Oral daily  metoprolol tartrate 100 milliGRAM(s) Oral daily  sodium bicarbonate 1300 milliGRAM(s) Oral three times a day  tamsulosin 0.4 milliGRAM(s) Oral at bedtime  traZODone 100 milliGRAM(s) Oral at bedtime    PRN Inpatient Medications  dextrose Oral Gel 15 Gram(s) Oral once PRN  haloperidol    Injectable 2 milliGRAM(s) IV Push every 6 hours PRN      REVIEW OF SYSTEMS  --------------------------------------------------------------------------------  per above    VITALS/PHYSICAL EXAM  --------------------------------------------------------------------------------  T(C): 36.7 (01-14-24 @ 05:05), Max: 36.9 (01-13-24 @ 14:14)  HR: 66 (01-14-24 @ 12:49) (66 - 84)  BP: 149/70 (01-14-24 @ 12:49) (135/55 - 155/71)  RR: 18 (01-14-24 @ 12:49) (18 - 18)  SpO2: 99% (01-14-24 @ 12:49) (97% - 99%)  Wt(kg): --        01-13-24 @ 07:01  -  01-14-24 @ 07:00  --------------------------------------------------------  IN: 720 mL / OUT: 450 mL / NET: 270 mL      Physical Exam:  General: no acute distress  Neuro: no focal deficits  HEENT: NC/AT, anicteric, no JVD  Pulmonary: lungs CTA B/L  Cardiovascular/Chest: +S1S2, RRR  GI/Abdomen: soft, NT/ND, +bowel sounds  Extremities: No edema   Skin: Warm and dry        LABS/STUDIES  --------------------------------------------------------------------------------              7.7    3.16  >-----------<  100      [01-14-24 @ 07:23]              24.2     136  |  101  |  84  ----------------------------<  117      [01-14-24 @ 07:25]  4.0   |  22  |  5.47        Ca     7.9     [01-14-24 @ 07:25]      Mg     1.6     [01-14-24 @ 07:25]      Phos  4.1     [01-14-24 @ 07:25]      Creatinine Trend:  SCr 5.47 [01-14 @ 07:25]  SCr 5.27 [01-13 @ 07:00]  SCr 5.23 [01-12 @ 18:57]  SCr 5.06 [01-12 @ 12:52]  SCr 5.34 [01-12 @ 06:38]    Urinalysis - [01-14-24 @ 07:25]      Color  / Appearance  / SG  / pH       Gluc 117 / Ketone   / Bili  / Urobili        Blood  / Protein  / Leuk Est  / Nitrite       RBC  / WBC  / Hyaline  / Gran  / Sq Epi  / Non Sq Epi  / Bacteria         HCV 12.81, Reactive      [01-12-24 @ 06:41]
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
## 2024-01-14 NOTE — BH CONSULTATION LIAISON PROGRESS NOTE - NSBHCHARTREVIEWINVESTIGATE_PSY_A_CORE FT
< from: 12 Lead ECG (01.04.24 @ 23:51) >      Ventricular Rate 69 BPM    Atrial Rate 69 BPM    P-R Interval 160 ms    QRS Duration 76 ms    Q-T Interval 398 ms    QTC Calculation(Bazett) 426 ms    P Axis 63 degrees    R Axis 72 degrees    T Axis 52 degrees    Diagnosis Line BASELINE ARTIFACT  NORMAL SINUS RHYTHM  NORMAL ECG  WHEN COMPARED WITH ECG OF 04-JAN-2024 12:45, (UNCONFIRMED)  NO SIGNIFICANT CHANGE WAS FOUND  Confirmed by MD Minh, Mukesh ([id number]) on 1/5/2024 7:37:24 PM    < end of copied text > < from: 12 Lead ECG (01.04.24 @ 23:51) >      Ventricular Rate 69 BPM    Atrial Rate 69 BPM    P-R Interval 160 ms    QRS Duration 76 ms    Q-T Interval 398 ms    QTC Calculation(Bazett) 426 ms    P Axis 63 degrees    R Axis 72 degrees    T Axis 52 degrees    Diagnosis Line BASELINE ARTIFACT  NORMAL SINUS RHYTHM  NORMAL ECG  WHEN COMPARED WITH ECG OF 04-JAN-2024 12:45, (UNCONFIRMED)  NO SIGNIFICANT CHANGE WAS FOUND  Confirmed by MD Minh, Mukesh ([id number]) on 1/5/2024 7:37:24 PM    < end of copied text >

## 2024-01-14 NOTE — PROGRESS NOTE ADULT - ATTENDING COMMENTS
Advanced CKD: Non oliguric  Kidney function overall stable since admission   K in range  Maintain sodium bicarbonate    Rest as per Dr. Omkar Rubin MD  O: [phone]  Contact me on teams Advanced CKD: Non oliguric  Kidney function overall stable since admission   K in range  Maintain sodium bicarbonate    Rest as per Dr. Omkar Rubin MD  O: [phone]  Contact me on teams
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
## 2024-01-14 NOTE — PROGRESS NOTE ADULT - ASSESSMENT
79 yo male h/o schizophrenia, dementia, agitation, ckd, dm, htn, presenting to ER with agitation    schizophrenia/dementia/agitation  psy following  plan for inpt psy admission 2PC  psy meds as per psy  d/w psych attending    pt needs constant observation and will be 2pc to inpt psy facility     ckd  stable   creat at baseline  pt makes urine  no need for urgent HD  lokelma given for hyperKalemia today  to f/u with outpt nephrologist as noted in nephrology consult note from 12/27/23  renal f/u noted  no plan for dialysis at this time. pt will need weekly bmp. as long as creat remains stable and bmp wnl then no plans to start HD.    will need routine f/u with outpt nephrologist     hyperkalemia  s/p lokelma, calcium, lasix  potassium improved  cont monitor    HTN  resume outpt bp meds    anemia  chronic  due to ckd  iron supp  monitor     dm  lantus and iss   monitor fs  decreased lantus to 4units given hypoglycemic event     pt medically stable for dc to inpt psy facility     Advanced care planning was discussed with patient and family.  Advanced care planning forms were reviewed and discussed as appropriate.  Differential diagnosis and plan of care discussed with patient after the evaluation.   Pain assessed and judicious use of narcotics when appropriate was discussed.  Importance of Fall prevention discussed.  Counseling on Smoking and Alcohol cessation was offered when appropriate.  Counseling on Diet, exercise, and medication compliance was done.       Approx 75 minutes spent.

## 2024-01-14 NOTE — PROGRESS NOTE ADULT - PROBLEM SELECTOR PLAN 2
Elevated potassium 5.7 on 1/11, but improved with lokelma 10 TID. Last dose on 1/12. K 4 today.  Ensure low potassium diet. Monitor

## 2024-01-14 NOTE — PROGRESS NOTE ADULT - PROBLEM SELECTOR PLAN 3
Low serum bicarb- continue sodium bicarb to 1300 TID.    If you have any questions, please feel free to contact me  Lukas Espitia  Nephrology Fellow  [phone]; Prefer [employer] TEAMS  (After 5pm or on weekends please page the on-call fellow). Low serum bicarb- continue sodium bicarb to 1300 TID.    If you have any questions, please feel free to contact me  Lukas Espitia  Nephrology Fellow  [phone]; Prefer [employer] TEAMS  (After 5pm or on weekends please page the on-call fellow).

## 2024-01-14 NOTE — PROGRESS NOTE ADULT - PROBLEM SELECTOR PLAN 1
Pt with chronic kidney disease, known to be stage 5 from uncontrolled HTN. Was recently admitted to [facility] for similar reasons and noted to have a Scr of 5.04. Dr. Escobar saw patient while he was here and discussed with sister Eli Okeefe today  and Ms Cedillo at the [facility]- he attends his appointments regularly. Now here with BUN/Cr of 90/5 on (1/8) stable from last admission.    SCr stable at ~5.5, with stable BUN 80-90s Please monitor serial bladder scan to ensure no retention. Ensure renal restricted diet. no indication for dialysis initiation at this time. Pt with chronic kidney disease, known to be stage 5 from uncontrolled HTN. Was recently admitted to [facility] for similar reasons and noted to have a Scr of 5.04. Dr. Escobar saw patient while he was here and discussed with sister Eli Okeefe today  and Ms Cedillo at the [facility]- he attends his appointments regularly. Now here with BUN/Cr of 90/5 on (1/8) stable from last admission.    SCr stable at ~5.5, with stable BUN 80-90s Please monitor serial bladder scan to ensure no retention. Ensure renal restricted diet. no indication for dialysis initiation at this time.
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
## 2024-01-15 LAB
ANION GAP SERPL CALC-SCNC: 14 MMOL/L — SIGNIFICANT CHANGE UP (ref 5–17)
ANION GAP SERPL CALC-SCNC: 14 MMOL/L — SIGNIFICANT CHANGE UP (ref 5–17)
BUN SERPL-MCNC: 92 MG/DL — HIGH (ref 7–23)
BUN SERPL-MCNC: 92 MG/DL — HIGH (ref 7–23)
CALCIUM SERPL-MCNC: 8.2 MG/DL — LOW (ref 8.4–10.5)
CALCIUM SERPL-MCNC: 8.2 MG/DL — LOW (ref 8.4–10.5)
CHLORIDE SERPL-SCNC: 103 MMOL/L — SIGNIFICANT CHANGE UP (ref 96–108)
CHLORIDE SERPL-SCNC: 103 MMOL/L — SIGNIFICANT CHANGE UP (ref 96–108)
CO2 SERPL-SCNC: 22 MMOL/L — SIGNIFICANT CHANGE UP (ref 22–31)
CO2 SERPL-SCNC: 22 MMOL/L — SIGNIFICANT CHANGE UP (ref 22–31)
CREAT SERPL-MCNC: 5.95 MG/DL — HIGH (ref 0.5–1.3)
CREAT SERPL-MCNC: 5.95 MG/DL — HIGH (ref 0.5–1.3)
EGFR: 9 ML/MIN/1.73M2 — LOW
EGFR: 9 ML/MIN/1.73M2 — LOW
GLUCOSE BLDC GLUCOMTR-MCNC: 103 MG/DL — HIGH (ref 70–99)
GLUCOSE BLDC GLUCOMTR-MCNC: 103 MG/DL — HIGH (ref 70–99)
GLUCOSE BLDC GLUCOMTR-MCNC: 113 MG/DL — HIGH (ref 70–99)
GLUCOSE BLDC GLUCOMTR-MCNC: 136 MG/DL — HIGH (ref 70–99)
GLUCOSE BLDC GLUCOMTR-MCNC: 136 MG/DL — HIGH (ref 70–99)
GLUCOSE BLDC GLUCOMTR-MCNC: 152 MG/DL — HIGH (ref 70–99)
GLUCOSE BLDC GLUCOMTR-MCNC: 209 MG/DL — HIGH (ref 70–99)
GLUCOSE BLDC GLUCOMTR-MCNC: 209 MG/DL — HIGH (ref 70–99)
GLUCOSE SERPL-MCNC: 94 MG/DL — SIGNIFICANT CHANGE UP (ref 70–99)
GLUCOSE SERPL-MCNC: 94 MG/DL — SIGNIFICANT CHANGE UP (ref 70–99)
HCT VFR BLD CALC: 22.9 % — LOW (ref 39–50)
HCT VFR BLD CALC: 22.9 % — LOW (ref 39–50)
HGB BLD-MCNC: 7.5 G/DL — LOW (ref 13–17)
HGB BLD-MCNC: 7.5 G/DL — LOW (ref 13–17)
MAGNESIUM SERPL-MCNC: 1.5 MG/DL — LOW (ref 1.6–2.6)
MAGNESIUM SERPL-MCNC: 1.5 MG/DL — LOW (ref 1.6–2.6)
MCHC RBC-ENTMCNC: 28.8 PG — SIGNIFICANT CHANGE UP (ref 27–34)
MCHC RBC-ENTMCNC: 28.8 PG — SIGNIFICANT CHANGE UP (ref 27–34)
MCHC RBC-ENTMCNC: 32.8 GM/DL — SIGNIFICANT CHANGE UP (ref 32–36)
MCHC RBC-ENTMCNC: 32.8 GM/DL — SIGNIFICANT CHANGE UP (ref 32–36)
MCV RBC AUTO: 88.1 FL — SIGNIFICANT CHANGE UP (ref 80–100)
MCV RBC AUTO: 88.1 FL — SIGNIFICANT CHANGE UP (ref 80–100)
NRBC # BLD: 0 /100 WBCS — SIGNIFICANT CHANGE UP (ref 0–0)
NRBC # BLD: 0 /100 WBCS — SIGNIFICANT CHANGE UP (ref 0–0)
PHOSPHATE SERPL-MCNC: 3.6 MG/DL — SIGNIFICANT CHANGE UP (ref 2.5–4.5)
PHOSPHATE SERPL-MCNC: 3.6 MG/DL — SIGNIFICANT CHANGE UP (ref 2.5–4.5)
PLATELET # BLD AUTO: 93 K/UL — LOW (ref 150–400)
PLATELET # BLD AUTO: 93 K/UL — LOW (ref 150–400)
POTASSIUM SERPL-MCNC: 4.5 MMOL/L — SIGNIFICANT CHANGE UP (ref 3.5–5.3)
POTASSIUM SERPL-MCNC: 4.5 MMOL/L — SIGNIFICANT CHANGE UP (ref 3.5–5.3)
POTASSIUM SERPL-SCNC: 4.5 MMOL/L — SIGNIFICANT CHANGE UP (ref 3.5–5.3)
POTASSIUM SERPL-SCNC: 4.5 MMOL/L — SIGNIFICANT CHANGE UP (ref 3.5–5.3)
RBC # BLD: 2.6 M/UL — LOW (ref 4.2–5.8)
RBC # BLD: 2.6 M/UL — LOW (ref 4.2–5.8)
RBC # FLD: 13.7 % — SIGNIFICANT CHANGE UP (ref 10.3–14.5)
RBC # FLD: 13.7 % — SIGNIFICANT CHANGE UP (ref 10.3–14.5)
SODIUM SERPL-SCNC: 139 MMOL/L — SIGNIFICANT CHANGE UP (ref 135–145)
SODIUM SERPL-SCNC: 139 MMOL/L — SIGNIFICANT CHANGE UP (ref 135–145)
WBC # BLD: 3.41 K/UL — LOW (ref 3.8–10.5)
WBC # BLD: 3.41 K/UL — LOW (ref 3.8–10.5)
WBC # FLD AUTO: 3.41 K/UL — LOW (ref 3.8–10.5)
WBC # FLD AUTO: 3.41 K/UL — LOW (ref 3.8–10.5)

## 2024-01-15 PROCEDURE — 99231 SBSQ HOSP IP/OBS SF/LOW 25: CPT

## 2024-01-15 RX ADMIN — INSULIN GLARGINE 4 UNIT(S): 100 INJECTION, SOLUTION SUBCUTANEOUS at 22:09

## 2024-01-15 RX ADMIN — Medication 1300 MILLIGRAM(S): at 05:04

## 2024-01-15 RX ADMIN — Medication 100 MILLIGRAM(S): at 21:34

## 2024-01-15 RX ADMIN — TAMSULOSIN HYDROCHLORIDE 0.4 MILLIGRAM(S): 0.4 CAPSULE ORAL at 21:34

## 2024-01-15 RX ADMIN — ISOSORBIDE MONONITRATE 30 MILLIGRAM(S): 60 TABLET, EXTENDED RELEASE ORAL at 11:43

## 2024-01-15 RX ADMIN — Medication 25 MILLIGRAM(S): at 05:05

## 2024-01-15 RX ADMIN — Medication 1300 MILLIGRAM(S): at 13:56

## 2024-01-15 RX ADMIN — DIVALPROEX SODIUM 500 MILLIGRAM(S): 500 TABLET, DELAYED RELEASE ORAL at 17:35

## 2024-01-15 RX ADMIN — Medication 1300 MILLIGRAM(S): at 21:36

## 2024-01-15 RX ADMIN — Medication 100 MILLIGRAM(S): at 05:05

## 2024-01-15 RX ADMIN — CLOPIDOGREL BISULFATE 75 MILLIGRAM(S): 75 TABLET, FILM COATED ORAL at 11:43

## 2024-01-15 RX ADMIN — HALOPERIDOL DECANOATE 2 MILLIGRAM(S): 100 INJECTION INTRAMUSCULAR at 21:35

## 2024-01-15 RX ADMIN — Medication 25 MILLIGRAM(S): at 21:36

## 2024-01-15 RX ADMIN — Medication 25 MILLIGRAM(S): at 13:56

## 2024-01-15 RX ADMIN — FINASTERIDE 5 MILLIGRAM(S): 5 TABLET, FILM COATED ORAL at 11:43

## 2024-01-15 RX ADMIN — DIVALPROEX SODIUM 500 MILLIGRAM(S): 500 TABLET, DELAYED RELEASE ORAL at 05:05
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
## 2024-01-15 NOTE — BH CONSULTATION LIAISON PROGRESS NOTE - NSBHATTESTBILLING_PSY_A_CORE
[id number] OBS or IP - low complexity OR 25-34 mins [id number] OBS or IP - low complexity OR 25-34 mins [id number] OBS or IP - low complexity OR 25-34 mins
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
## 2024-01-15 NOTE — BH CONSULTATION LIAISON PROGRESS NOTE - NSBHCHARTREVIEWINVESTIGATE_PSY_A_CORE FT
< from: 12 Lead ECG (01.04.24 @ 23:51) >      Ventricular Rate 69 BPM    Atrial Rate 69 BPM    P-R Interval 160 ms    QRS Duration 76 ms    Q-T Interval 398 ms    QTC Calculation(Bazett) 426 ms    P Axis 63 degrees    R Axis 72 degrees    T Axis 52 degrees    Diagnosis Line BASELINE ARTIFACT  NORMAL SINUS RHYTHM  NORMAL ECG  WHEN COMPARED WITH ECG OF 04-JAN-2024 12:45, (UNCONFIRMED)  NO SIGNIFICANT CHANGE WAS FOUND  Confirmed by MD Minh, Mukesh ([id number]) on 1/5/2024 7:37:24 PM    < end of copied text > < from: 12 Lead ECG (01.04.24 @ 23:51) >      Ventricular Rate 69 BPM    Atrial Rate 69 BPM    P-R Interval 160 ms    QRS Duration 76 ms    Q-T Interval 398 ms    QTC Calculation(Bazett) 426 ms    P Axis 63 degrees    R Axis 72 degrees    T Axis 52 degrees    Diagnosis Line BASELINE ARTIFACT  NORMAL SINUS RHYTHM  NORMAL ECG  WHEN COMPARED WITH ECG OF 04-JAN-2024 12:45, (UNCONFIRMED)  NO SIGNIFICANT CHANGE WAS FOUND  Confirmed by MD Minh, Mukesh ([id number]) on 1/5/2024 7:37:24 PM    < end of copied text > < from: 12 Lead ECG (01.04.24 @ 23:51) >      Ventricular Rate 69 BPM    Atrial Rate 69 BPM    P-R Interval 160 ms    QRS Duration 76 ms    Q-T Interval 398 ms    QTC Calculation(Bazett) 426 ms    P Axis 63 degrees    R Axis 72 degrees    T Axis 52 degrees    Diagnosis Line BASELINE ARTIFACT  NORMAL SINUS RHYTHM  NORMAL ECG  WHEN COMPARED WITH ECG OF 04-JAN-2024 12:45, (UNCONFIRMED)  NO SIGNIFICANT CHANGE WAS FOUND  Confirmed by MD Minh, Mukesh ([id number]) on 1/5/2024 7:37:24 PM    < end of copied text >

## 2024-01-16 LAB
GLUCOSE BLDC GLUCOMTR-MCNC: 113 MG/DL — HIGH (ref 70–99)
GLUCOSE BLDC GLUCOMTR-MCNC: 160 MG/DL — HIGH (ref 70–99)
GLUCOSE BLDC GLUCOMTR-MCNC: 172 MG/DL — HIGH (ref 70–99)
GLUCOSE BLDC GLUCOMTR-MCNC: 183 MG/DL — HIGH (ref 70–99)
SARS-COV-2 RNA SPEC QL NAA+PROBE: SIGNIFICANT CHANGE UP
SARS-COV-2 RNA SPEC QL NAA+PROBE: SIGNIFICANT CHANGE UP

## 2024-01-16 PROCEDURE — 99231 SBSQ HOSP IP/OBS SF/LOW 25: CPT

## 2024-01-16 PROCEDURE — 99232 SBSQ HOSP IP/OBS MODERATE 35: CPT | Mod: GC

## 2024-01-16 RX ORDER — NYSTATIN CREAM 100000 [USP'U]/G
1 CREAM TOPICAL
Refills: 0 | Status: DISCONTINUED | OUTPATIENT
Start: 2024-01-16 | End: 2024-01-07

## 2024-01-16 RX ORDER — ACETAMINOPHEN 500 MG
650 TABLET ORAL ONCE
Refills: 0 | Status: COMPLETED | OUTPATIENT
Start: 2024-01-16 | End: 2024-01-16

## 2024-01-16 RX ADMIN — Medication 1300 MILLIGRAM(S): at 13:07

## 2024-01-16 RX ADMIN — Medication 650 MILLIGRAM(S): at 23:20

## 2024-01-16 RX ADMIN — ISOSORBIDE MONONITRATE 30 MILLIGRAM(S): 60 TABLET, EXTENDED RELEASE ORAL at 13:06

## 2024-01-16 RX ADMIN — HALOPERIDOL DECANOATE 2 MILLIGRAM(S): 100 INJECTION INTRAMUSCULAR at 22:49

## 2024-01-16 RX ADMIN — Medication 25 MILLIGRAM(S): at 22:24

## 2024-01-16 RX ADMIN — DIVALPROEX SODIUM 500 MILLIGRAM(S): 500 TABLET, DELAYED RELEASE ORAL at 19:02

## 2024-01-16 RX ADMIN — Medication 1: at 13:00

## 2024-01-16 RX ADMIN — DIVALPROEX SODIUM 500 MILLIGRAM(S): 500 TABLET, DELAYED RELEASE ORAL at 05:23

## 2024-01-16 RX ADMIN — CLOPIDOGREL BISULFATE 75 MILLIGRAM(S): 75 TABLET, FILM COATED ORAL at 13:06

## 2024-01-16 RX ADMIN — Medication 1: at 09:02

## 2024-01-16 RX ADMIN — Medication 1300 MILLIGRAM(S): at 05:23

## 2024-01-16 RX ADMIN — Medication 100 MILLIGRAM(S): at 05:23

## 2024-01-16 RX ADMIN — Medication 650 MILLIGRAM(S): at 22:50

## 2024-01-16 RX ADMIN — FINASTERIDE 5 MILLIGRAM(S): 5 TABLET, FILM COATED ORAL at 13:06

## 2024-01-16 RX ADMIN — Medication 25 MILLIGRAM(S): at 13:08

## 2024-01-16 RX ADMIN — Medication 1300 MILLIGRAM(S): at 22:24

## 2024-01-16 RX ADMIN — Medication 100 MILLIGRAM(S): at 22:25

## 2024-01-16 RX ADMIN — INSULIN GLARGINE 4 UNIT(S): 100 INJECTION, SOLUTION SUBCUTANEOUS at 22:24

## 2024-01-16 RX ADMIN — Medication 25 MILLIGRAM(S): at 05:23

## 2024-01-16 RX ADMIN — TAMSULOSIN HYDROCHLORIDE 0.4 MILLIGRAM(S): 0.4 CAPSULE ORAL at 22:24
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
## 2024-01-16 NOTE — BH CONSULTATION LIAISON PROGRESS NOTE - NSBHCHARTREVIEWINVESTIGATE_PSY_A_CORE FT
< from: 12 Lead ECG (01.04.24 @ 23:51) >      Ventricular Rate 69 BPM    Atrial Rate 69 BPM    P-R Interval 160 ms    QRS Duration 76 ms    Q-T Interval 398 ms    QTC Calculation(Bazett) 426 ms    P Axis 63 degrees    R Axis 72 degrees    T Axis 52 degrees    Diagnosis Line BASELINE ARTIFACT  NORMAL SINUS RHYTHM  NORMAL ECG  WHEN COMPARED WITH ECG OF 04-JAN-2024 12:45, (UNCONFIRMED)  NO SIGNIFICANT CHANGE WAS FOUND  Confirmed by MD Minh, Mukesh ([id number]) on 1/5/2024 7:37:24 PM    < end of copied text > < from: 12 Lead ECG (01.04.24 @ 23:51) >      Ventricular Rate 69 BPM    Atrial Rate 69 BPM    P-R Interval 160 ms    QRS Duration 76 ms    Q-T Interval 398 ms    QTC Calculation(Bazett) 426 ms    P Axis 63 degrees    R Axis 72 degrees    T Axis 52 degrees    Diagnosis Line BASELINE ARTIFACT  NORMAL SINUS RHYTHM  NORMAL ECG  WHEN COMPARED WITH ECG OF 04-JAN-2024 12:45, (UNCONFIRMED)  NO SIGNIFICANT CHANGE WAS FOUND  Confirmed by MD Minh, Mukesh ([id number]) on 1/5/2024 7:37:24 PM    < end of copied text >
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
## 2024-01-16 NOTE — PROGRESS NOTE ADULT - PROBLEM SELECTOR PLAN 2
Elevated potassium 5.7 on 1/11, but improved with lokelma 10 TID. Last dose on 1/12. K 4.5 today.  Ensure low potassium diet. Monitor serum potassium.

## 2024-01-16 NOTE — PROGRESS NOTE ADULT - SUBJECTIVE AND OBJECTIVE BOX
[facility] Division of Kidney Diseases & Hypertension  FOLLOW UP NOTE  [phone]--------------------------------------------------------------------------------    Chief Complaint: Advanced CKD, hyperkalemia    24 hour events/subjective: Pt. seen and evaluated at the bedside this morning eating breakfast. Pt offers no complaints and feels well.     PAST HISTORY  --------------------------------------------------------------------------------  No significant changes to PMH, PSH, FHx, SHx, unless otherwise noted    ALLERGIES & MEDICATIONS  --------------------------------------------------------------------------------  Allergies  amlodipine (Other)  IV Contrast (Other)  Haldol (Other)    Intolerances    Standing Inpatient Medications  clopidogrel Tablet 75 milliGRAM(s) Oral daily  dextrose 5%. 1000 milliLiter(s) IV Continuous <Continuous>  dextrose 5%. 1000 milliLiter(s) IV Continuous <Continuous>  dextrose 50% Injectable 25 Gram(s) IV Push once  dextrose 50% Injectable 25 Gram(s) IV Push once  dextrose 50% Injectable 12.5 Gram(s) IV Push once  divalproex  milliGRAM(s) Oral two times a day  finasteride 5 milliGRAM(s) Oral daily  glipiZIDE. 5 milliGRAM(s) Oral daily  glucagon  Injectable 1 milliGRAM(s) IntraMuscular once  haloperidol     Tablet 2 milliGRAM(s) Oral at bedtime  hydrALAZINE 25 milliGRAM(s) Oral three times a day  insulin glargine Injectable (LANTUS) 4 Unit(s) SubCutaneous at bedtime  insulin lispro (ADMELOG) corrective regimen sliding scale   SubCutaneous three times a day before meals  isosorbide   mononitrate ER Tablet (IMDUR) 30 milliGRAM(s) Oral daily  metoprolol tartrate 100 milliGRAM(s) Oral daily  sodium bicarbonate 1300 milliGRAM(s) Oral three times a day  tamsulosin 0.4 milliGRAM(s) Oral at bedtime  traZODone 100 milliGRAM(s) Oral at bedtime    PRN Inpatient Medications  dextrose Oral Gel 15 Gram(s) Oral once PRN  haloperidol    Injectable 2 milliGRAM(s) IV Push every 6 hours PRN    REVIEW OF SYSTEMS  --------------------------------------------------------------------------------  Gen: No fevers/chills  Skin: No rashes  Head/Eyes/Ears/Mouth: No headache  Respiratory: No dyspnea, cough  CV: No chest pain  GI: No abdominal pain, diarrhea, constipation, nausea, vomiting  : No increased frequency, dysuria, hematuria, nocturia  MSK: No joint pain, no edema  Neuro: No dizziness/lightheadedness, weakness    All other systems were reviewed and are negative, except as noted.    VITALS/PHYSICAL EXAM  --------------------------------------------------------------------------------  T(C): 36.6 (01-16-24 @ 13:22), Max: 36.6 (01-16-24 @ 13:22)  HR: 76 (01-16-24 @ 13:22) (71 - 76)  BP: 149/72 (01-16-24 @ 04:59) (149/72 - 173/95)  RR: 18 (01-16-24 @ 13:22) (18 - 18)  SpO2: 100% (01-16-24 @ 13:22) (99% - 100%)  Wt(kg): --    01-15-24 @ 07:01  -  01-16-24 @ 07:00  --------------------------------------------------------  IN: 480 mL / OUT: 950 mL / NET: -470 mL    01-16-24 @ 07:01  -  01-16-24 @ 14:00  --------------------------------------------------------  IN: 0 mL / OUT: 700 mL / NET: -700 mL    Physical Exam:  General: no acute distress  Neuro: awake  HEENT: NC/AT, anicteric  Pulmonary: lungs CTA B/L  Cardiovascular/Chest: +S1S2, RRR  GI/Abdomen: soft, NT/ND, +bowel sounds  Extremities: No edema   Skin: Warm and dry    LABS/STUDIES  --------------------------------------------------------------------------------              7.5    3.41  >-----------<  93       [01-15-24 @ 07:08]              22.9     139  |  103  |  92  ----------------------------<  94      [01-15-24 @ 07:08]  4.5   |  22  |  5.95        Ca     8.2     [01-15-24 @ 07:08]      Mg     1.5     [01-15-24 @ 07:08]      Phos  3.6     [01-15-24 @ 07:08]    Creatinine Trend:  SCr 5.95 [01-15 @ 07:08]  SCr 5.47 [01-14 @ 07:25]  SCr 5.27 [01-13 @ 07:00]  SCr 5.23 [01-12 @ 18:57]  SCr 5.06 [01-12 @ 12:52]    Urinalysis - [01-15-24 @ 07:08]      Color  / Appearance  / SG  / pH       Gluc 94 / Ketone   / Bili  / Urobili        Blood  / Protein  / Leuk Est  / Nitrite       RBC  / WBC  / Hyaline  / Gran  / Sq Epi  / Non Sq Epi  / Bacteria     HCV 12.81, Reactive      [01-12-24 @ 06:41] [facility] Division of Kidney Diseases & Hypertension  FOLLOW UP NOTE  [phone]--------------------------------------------------------------------------------    Chief Complaint: Advanced CKD, hyperkalemia    24 hour events/subjective: Pt. seen and evaluated at the bedside this morning eating breakfast. Pt offers no complaints and feels well.     PAST HISTORY  --------------------------------------------------------------------------------  No significant changes to PMH, PSH, FHx, SHx, unless otherwise noted    ALLERGIES & MEDICATIONS  --------------------------------------------------------------------------------  Allergies  amlodipine (Other)  IV Contrast (Other)  Haldol (Other)    Intolerances    Standing Inpatient Medications  clopidogrel Tablet 75 milliGRAM(s) Oral daily  dextrose 5%. 1000 milliLiter(s) IV Continuous <Continuous>  dextrose 5%. 1000 milliLiter(s) IV Continuous <Continuous>  dextrose 50% Injectable 25 Gram(s) IV Push once  dextrose 50% Injectable 25 Gram(s) IV Push once  dextrose 50% Injectable 12.5 Gram(s) IV Push once  divalproex  milliGRAM(s) Oral two times a day  finasteride 5 milliGRAM(s) Oral daily  glipiZIDE. 5 milliGRAM(s) Oral daily  glucagon  Injectable 1 milliGRAM(s) IntraMuscular once  haloperidol     Tablet 2 milliGRAM(s) Oral at bedtime  hydrALAZINE 25 milliGRAM(s) Oral three times a day  insulin glargine Injectable (LANTUS) 4 Unit(s) SubCutaneous at bedtime  insulin lispro (ADMELOG) corrective regimen sliding scale   SubCutaneous three times a day before meals  isosorbide   mononitrate ER Tablet (IMDUR) 30 milliGRAM(s) Oral daily  metoprolol tartrate 100 milliGRAM(s) Oral daily  sodium bicarbonate 1300 milliGRAM(s) Oral three times a day  tamsulosin 0.4 milliGRAM(s) Oral at bedtime  traZODone 100 milliGRAM(s) Oral at bedtime    PRN Inpatient Medications  dextrose Oral Gel 15 Gram(s) Oral once PRN  haloperidol    Injectable 2 milliGRAM(s) IV Push every 6 hours PRN    REVIEW OF SYSTEMS  --------------------------------------------------------------------------------  Gen: No fevers/chills  Skin: No rashes  Head/Eyes/Ears/Mouth: No headache  Respiratory: No dyspnea, cough  CV: No chest pain  GI: No abdominal pain, diarrhea, constipation, nausea, vomiting  : No increased frequency, dysuria, hematuria, nocturia  MSK: No joint pain, no edema  Neuro: No dizziness/lightheadedness, weakness    All other systems were reviewed and are negative, except as noted.    VITALS/PHYSICAL EXAM  --------------------------------------------------------------------------------  T(C): 36.6 (01-16-24 @ 13:22), Max: 36.6 (01-16-24 @ 13:22)  HR: 76 (01-16-24 @ 13:22) (71 - 76)  BP: 149/72 (01-16-24 @ 04:59) (149/72 - 173/95)  RR: 18 (01-16-24 @ 13:22) (18 - 18)  SpO2: 100% (01-16-24 @ 13:22) (99% - 100%)  Wt(kg): --    01-15-24 @ 07:01  -  01-16-24 @ 07:00  --------------------------------------------------------  IN: 480 mL / OUT: 950 mL / NET: -470 mL    01-16-24 @ 07:01  -  01-16-24 @ 14:00  --------------------------------------------------------  IN: 0 mL / OUT: 700 mL / NET: -700 mL    Physical Exam:  General: no acute distress  Neuro: awake  HEENT: NC/AT, anicteric  Pulmonary: lungs CTA B/L  Cardiovascular/Chest: +S1S2, RRR  GI/Abdomen: soft, NT/ND, +bowel sounds  Extremities: No edema   Skin: Warm and dry    LABS/STUDIES  --------------------------------------------------------------------------------              7.5    3.41  >-----------<  93       [01-15-24 @ 07:08]              22.9     139  |  103  |  92  ----------------------------<  94      [01-15-24 @ 07:08]  4.5   |  22  |  5.95        Ca     8.2     [01-15-24 @ 07:08]      Mg     1.5     [01-15-24 @ 07:08]      Phos  3.6     [01-15-24 @ 07:08]    Creatinine Trend:  SCr 5.95 [01-15 @ 07:08]  SCr 5.47 [01-14 @ 07:25]  SCr 5.27 [01-13 @ 07:00]  SCr 5.23 [01-12 @ 18:57]  SCr 5.06 [01-12 @ 12:52]    Urinalysis - [01-15-24 @ 07:08]      Color  / Appearance  / SG  / pH       Gluc 94 / Ketone   / Bili  / Urobili        Blood  / Protein  / Leuk Est  / Nitrite       RBC  / WBC  / Hyaline  / Gran  / Sq Epi  / Non Sq Epi  / Bacteria     HCV 12.81, Reactive      [01-12-24 @ 06:41]

## 2024-01-16 NOTE — PROGRESS NOTE ADULT - ATTENDING COMMENTS
Advanced CKD: Non oliguric  Kidney function marginally worse  Please check Bladder scan to ensure he is not retaining  K in range  Maintain sodium bicarbonate    Also noted to be anemiC  Please check iron studies  He may benefit from CAROL      Rest as per Dr. Raffi Rubin MD  O: [phone]  Contact me on teams Advanced CKD: Non oliguric  Kidney function marginally worse  Please check Bladder scan to ensure he is not retaining  K in range  Maintain sodium bicarbonate    Also noted to be anemiC  Please check iron studies  He may benefit from CAROL      Rest as per Dr. Raffi Rubin MD  O: [phone]  Contact me on teams

## 2024-01-16 NOTE — BH CONSULTATION LIAISON PROGRESS NOTE - NSBHFUPINTERVALHXFT_PSY_A_CORE
pt seen, remains irritable, didn't want to talk to writer. pt labile, compliant with treatment. sleep fair, appetite limited. no si/hi. denies psychosis. no prns

## 2024-01-16 NOTE — PROGRESS NOTE ADULT - PROBLEM SELECTOR PLAN 1
Pt with chronic kidney disease, known to be stage 5 from uncontrolled HTN. Was recently admitted to [facility] for similar reasons and noted to have a Scr of 5.04. Now here with BUN/Cr of 90/5 on (1/8) stable from last admission.    SCr stable at ~5.95, with stable BUN 80-90s Please monitor serial bladder scan to ensure no retention. Ensure renal restricted diet. No indication for dialysis initiation at this time. Pt with chronic kidney disease, known to be stage 5 from uncontrolled HTN. Was recently admitted to [facility] for similar reasons and noted to have a Scr of 5.04. Now here with BUN/Cr of 90/5 on (1/8) stable from last admission.    SCr stable at ~5.95, with stable BUN 80-90s Please monitor serial bladder scan to ensure no retention. Ensure renal restricted diet. No indication for dialysis initiation at this time.
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
## 2024-01-16 NOTE — PROGRESS NOTE ADULT - PROBLEM SELECTOR PLAN 3
Low serum bicarb- continue sodium bicarb to 1300 TID.      If you have any questions, please feel free to contact me.  Cesar Nugent MD  Nephrology Fellow  [id number] / [employer] Teams (Preferred)  (After 4pm or on weekends, please call the on-call Fellow) Low serum bicarb- continue sodium bicarb to 1300 TID.      If you have any questions, please feel free to contact me.  Cesar Nugent MD  Nephrology Fellow  [id number] / [employer] Teams (Preferred)  (After 4pm or on weekends, please call the on-call Fellow)

## 2024-01-17 DIAGNOSIS — D64.9 ANEMIA, UNSPECIFIED: ICD-10-CM

## 2024-01-17 LAB
ANION GAP SERPL CALC-SCNC: 14 MMOL/L — SIGNIFICANT CHANGE UP (ref 5–17)
BUN SERPL-MCNC: 93 MG/DL — HIGH (ref 7–23)
CALCIUM SERPL-MCNC: 8.3 MG/DL — LOW (ref 8.4–10.5)
CHLORIDE SERPL-SCNC: 101 MMOL/L — SIGNIFICANT CHANGE UP (ref 96–108)
CO2 SERPL-SCNC: 22 MMOL/L — SIGNIFICANT CHANGE UP (ref 22–31)
CREAT SERPL-MCNC: 6.36 MG/DL — HIGH (ref 0.5–1.3)
EGFR: 8 ML/MIN/1.73M2 — LOW
GLUCOSE BLDC GLUCOMTR-MCNC: 120 MG/DL — HIGH (ref 70–99)
GLUCOSE BLDC GLUCOMTR-MCNC: 145 MG/DL — HIGH (ref 70–99)
GLUCOSE BLDC GLUCOMTR-MCNC: 166 MG/DL — HIGH (ref 70–99)
GLUCOSE BLDC GLUCOMTR-MCNC: 241 MG/DL — HIGH (ref 70–99)
GLUCOSE SERPL-MCNC: 140 MG/DL — HIGH (ref 70–99)
POTASSIUM SERPL-MCNC: 5 MMOL/L — SIGNIFICANT CHANGE UP (ref 3.5–5.3)
POTASSIUM SERPL-SCNC: 5 MMOL/L — SIGNIFICANT CHANGE UP (ref 3.5–5.3)
SODIUM SERPL-SCNC: 137 MMOL/L — SIGNIFICANT CHANGE UP (ref 135–145)

## 2024-01-17 PROCEDURE — 99232 SBSQ HOSP IP/OBS MODERATE 35: CPT | Mod: GC

## 2024-01-17 PROCEDURE — 99232 SBSQ HOSP IP/OBS MODERATE 35: CPT

## 2024-01-17 RX ORDER — HALOPERIDOL DECANOATE 100 MG/ML
1 INJECTION INTRAMUSCULAR DAILY
Refills: 0 | Status: DISCONTINUED | OUTPATIENT
Start: 2024-01-17 | End: 2024-01-07

## 2024-01-17 RX ADMIN — Medication 1300 MILLIGRAM(S): at 06:15

## 2024-01-17 RX ADMIN — ISOSORBIDE MONONITRATE 30 MILLIGRAM(S): 60 TABLET, EXTENDED RELEASE ORAL at 12:46

## 2024-01-17 RX ADMIN — Medication 100 MILLIGRAM(S): at 06:16

## 2024-01-17 RX ADMIN — Medication 25 MILLIGRAM(S): at 21:16

## 2024-01-17 RX ADMIN — DIVALPROEX SODIUM 500 MILLIGRAM(S): 500 TABLET, DELAYED RELEASE ORAL at 06:15

## 2024-01-17 RX ADMIN — HALOPERIDOL DECANOATE 2 MILLIGRAM(S): 100 INJECTION INTRAMUSCULAR at 21:16

## 2024-01-17 RX ADMIN — NYSTATIN CREAM 1 APPLICATION(S): 100000 CREAM TOPICAL at 06:16

## 2024-01-17 RX ADMIN — NYSTATIN CREAM 1 APPLICATION(S): 100000 CREAM TOPICAL at 18:22

## 2024-01-17 RX ADMIN — HALOPERIDOL DECANOATE 2 MILLIGRAM(S): 100 INJECTION INTRAMUSCULAR at 12:45

## 2024-01-17 RX ADMIN — Medication 1300 MILLIGRAM(S): at 16:20

## 2024-01-17 RX ADMIN — Medication 2: at 12:44

## 2024-01-17 RX ADMIN — DIVALPROEX SODIUM 500 MILLIGRAM(S): 500 TABLET, DELAYED RELEASE ORAL at 18:22

## 2024-01-17 RX ADMIN — Medication 25 MILLIGRAM(S): at 16:19

## 2024-01-17 RX ADMIN — Medication 100 MILLIGRAM(S): at 21:16

## 2024-01-17 RX ADMIN — TAMSULOSIN HYDROCHLORIDE 0.4 MILLIGRAM(S): 0.4 CAPSULE ORAL at 21:16

## 2024-01-17 RX ADMIN — INSULIN GLARGINE 4 UNIT(S): 100 INJECTION, SOLUTION SUBCUTANEOUS at 22:20

## 2024-01-17 RX ADMIN — CLOPIDOGREL BISULFATE 75 MILLIGRAM(S): 75 TABLET, FILM COATED ORAL at 12:46

## 2024-01-17 RX ADMIN — Medication 1300 MILLIGRAM(S): at 21:16

## 2024-01-17 RX ADMIN — Medication 25 MILLIGRAM(S): at 06:16

## 2024-01-17 RX ADMIN — FINASTERIDE 5 MILLIGRAM(S): 5 TABLET, FILM COATED ORAL at 12:45
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
## 2024-01-17 NOTE — BH CONSULTATION LIAISON PROGRESS NOTE - NSBHCHARTREVIEWINVESTIGATE_PSY_A_CORE FT
< from: 12 Lead ECG (01.04.24 @ 23:51) >      Ventricular Rate 69 BPM    Atrial Rate 69 BPM    P-R Interval 160 ms    QRS Duration 76 ms    Q-T Interval 398 ms    QTC Calculation(Bazett) 426 ms    P Axis 63 degrees    R Axis 72 degrees    T Axis 52 degrees    Diagnosis Line BASELINE ARTIFACT  NORMAL SINUS RHYTHM  NORMAL ECG  WHEN COMPARED WITH ECG OF 04-JAN-2024 12:45, (UNCONFIRMED)  NO SIGNIFICANT CHANGE WAS FOUND  Confirmed by MD Minh, Mukesh ([id number]) on 1/5/2024 7:37:24 PM    < end of copied text >

## 2024-01-17 NOTE — PROGRESS NOTE ADULT - PROBLEM SELECTOR PLAN 1
Pt with chronic kidney disease, known to be stage 5 from uncontrolled HTN. Was recently admitted to [facility] for similar reasons and noted to have a Scr of 5.04. Now here with BUN/Cr of 90/5 on (1/8) stable from last admission.    Scr elevated at 6.36 today with stable BUN 80-90s.  Please monitor serial bladder scan to ensure no retention.   Ensure low potassium/renal diet.   No indication for dialysis initiation at this time.
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
## 2024-01-17 NOTE — PROGRESS NOTE ADULT - SUBJECTIVE AND OBJECTIVE BOX
[facility] Division of Kidney Diseases & Hypertension  FOLLOW UP NOTE  [phone]--------------------------------------------------------------------------------    Chief Complaint: Advanced CKD, hyperkalemia    24 hour events/subjective: Pt. seen and evaluated at the bedside this morning. Pt reports scrotal pain but otherwise feels well.    PAST HISTORY  --------------------------------------------------------------------------------  No significant changes to PMH, PSH, FHx, SHx, unless otherwise noted    ALLERGIES & MEDICATIONS  --------------------------------------------------------------------------------  Allergies  amlodipine (Other)  IV Contrast (Other)  Haldol (Other)    Intolerances    Standing Inpatient Medications  clopidogrel Tablet 75 milliGRAM(s) Oral daily  dextrose 5%. 1000 milliLiter(s) IV Continuous <Continuous>  dextrose 5%. 1000 milliLiter(s) IV Continuous <Continuous>  dextrose 50% Injectable 12.5 Gram(s) IV Push once  dextrose 50% Injectable 25 Gram(s) IV Push once  dextrose 50% Injectable 25 Gram(s) IV Push once  divalproex  milliGRAM(s) Oral two times a day  finasteride 5 milliGRAM(s) Oral daily  glipiZIDE. 5 milliGRAM(s) Oral daily  glucagon  Injectable 1 milliGRAM(s) IntraMuscular once  haloperidol     Tablet 2 milliGRAM(s) Oral at bedtime  hydrALAZINE 25 milliGRAM(s) Oral three times a day  insulin glargine Injectable (LANTUS) 4 Unit(s) SubCutaneous at bedtime  insulin lispro (ADMELOG) corrective regimen sliding scale   SubCutaneous three times a day before meals  isosorbide   mononitrate ER Tablet (IMDUR) 30 milliGRAM(s) Oral daily  metoprolol tartrate 100 milliGRAM(s) Oral daily  nystatin Powder 1 Application(s) Topical two times a day  sodium bicarbonate 1300 milliGRAM(s) Oral three times a day  tamsulosin 0.4 milliGRAM(s) Oral at bedtime  traZODone 100 milliGRAM(s) Oral at bedtime    PRN Inpatient Medications  dextrose Oral Gel 15 Gram(s) Oral once PRN  haloperidol    Injectable 2 milliGRAM(s) IV Push every 6 hours PRN    REVIEW OF SYSTEMS  --------------------------------------------------------------------------------  Gen: No fevers/chills  Skin: No rashes  Head/Eyes/Ears/Mouth: No headache  Respiratory: No dyspnea, cough  CV: No chest pain  GI: No abdominal pain, diarrhea, constipation, nausea, vomiting  : No increased frequency, dysuria, hematuria, nocturia, +scrotal pain  MSK: No joint pain, no edema  Neuro: No dizziness/lightheadedness, weakness    All other systems were reviewed and are negative, except as noted.    VITALS/PHYSICAL EXAM  --------------------------------------------------------------------------------  T(C): 36.6 (01-17-24 @ 11:19), Max: 36.6 (01-17-24 @ 11:19)  HR: 72 (01-17-24 @ 11:19) (67 - 73)  BP: 154/69 (01-17-24 @ 11:19) (141/64 - 154/69)  RR: 18 (01-17-24 @ 11:19) (18 - 18)  SpO2: 100% (01-17-24 @ 11:19) (100% - 100%)  Wt(kg): --    01-16-24 @ 07:01  -  01-17-24 @ 07:00  --------------------------------------------------------  IN: 985 mL / OUT: 1750 mL / NET: -765 mL    Physical Exam:  General: no acute distress  Neuro: awake  HEENT: NC/AT, anicteric  Pulmonary: lungs CTA B/L  Cardiovascular/Chest: +S1S2, RRR  GI/Abdomen: soft, NT/ND, +bowel sounds  Extremities: No edema   Skin: Warm and dry    LABS/STUDIES  --------------------------------------------------------------------------------    137  |  101  |  93  ----------------------------<  140      [01-17-24 @ 07:47]  5.0   |  22  |  6.36        Ca     8.3     [01-17-24 @ 07:47]    Creatinine Trend:  SCr 6.36 [01-17 @ 07:47]  SCr 5.95 [01-15 @ 07:08]  SCr 5.47 [01-14 @ 07:25]  SCr 5.27 [01-13 @ 07:00]  SCr 5.23 [01-12 @ 18:57]    Urinalysis - [01-17-24 @ 07:47]      Color  / Appearance  / SG  / pH       Gluc 140 / Ketone   / Bili  / Urobili        Blood  / Protein  / Leuk Est  / Nitrite       RBC  / WBC  / Hyaline  / Gran  / Sq Epi  / Non Sq Epi  / Bacteria     HCV 12.81, Reactive      [01-12-24 @ 06:41]

## 2024-01-17 NOTE — PROGRESS NOTE ADULT - PROBLEM SELECTOR PLAN 4
Pt with low Hgb. Recommend checking iron studies and ferritin levels. Transfusion as per primary team. Monitor Hgb levels.       If you have any questions, please feel free to contact me.  Cesar Nugent MD  Nephrology Fellow  [phone] / [employer] Teams (Preferred)  (After 4pm or on weekends, please call the on-call Fellow)

## 2024-01-17 NOTE — PROGRESS NOTE ADULT - PROBLEM SELECTOR PLAN 2
Elevated potassium 5.7 on 1/11, but improved with lokelma 10 TID. Last dose on 1/12. K 5 today. Ensure low potassium diet. Monitor serum potassium.

## 2024-01-17 NOTE — BH CONSULTATION LIAISON PROGRESS NOTE - NSBHFUPINTERVALHXFT_PSY_A_CORE
Pt. seen and evaluated, chart, labs, meds reviewed. Patient remains irritable and labile, but overall compliant with treatment. His sleep is fair, appetite limited. No SI/HI. Denies psychosis. no prns.    As per nursing, not a management problem overnight.
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
## 2024-01-17 NOTE — PROGRESS NOTE ADULT - ATTENDING COMMENTS
Advanced CKD: Non oliguric  Kidney function continues to worsen. ? etiology. No e/o uremia or fluid overload.   No e/o retention    K in range  Maintain sodium bicarbonate    Also noted to be anemiC  Please check iron studies  He may benefit from CAROL      Rest as per Dr. Raffi Rubin MD  O: [phone]  Contact me on teams

## 2024-01-18 LAB
ANION GAP SERPL CALC-SCNC: 12 MMOL/L — SIGNIFICANT CHANGE UP (ref 5–17)
BUN SERPL-MCNC: 97 MG/DL — HIGH (ref 7–23)
CALCIUM SERPL-MCNC: 8.4 MG/DL — SIGNIFICANT CHANGE UP (ref 8.4–10.5)
CHLORIDE SERPL-SCNC: 102 MMOL/L — SIGNIFICANT CHANGE UP (ref 96–108)
CO2 SERPL-SCNC: 23 MMOL/L — SIGNIFICANT CHANGE UP (ref 22–31)
CREAT SERPL-MCNC: 5.91 MG/DL — HIGH (ref 0.5–1.3)
EGFR: 9 ML/MIN/1.73M2 — LOW
FERRITIN SERPL-MCNC: 239 NG/ML — SIGNIFICANT CHANGE UP (ref 30–400)
GLUCOSE BLDC GLUCOMTR-MCNC: 123 MG/DL — HIGH (ref 70–99)
GLUCOSE BLDC GLUCOMTR-MCNC: 128 MG/DL — HIGH (ref 70–99)
GLUCOSE BLDC GLUCOMTR-MCNC: 151 MG/DL — HIGH (ref 70–99)
GLUCOSE BLDC GLUCOMTR-MCNC: 286 MG/DL — HIGH (ref 70–99)
GLUCOSE SERPL-MCNC: 159 MG/DL — HIGH (ref 70–99)
HCT VFR BLD CALC: 23.5 % — LOW (ref 39–50)
HGB BLD-MCNC: 7.6 G/DL — LOW (ref 13–17)
IRON SATN MFR SERPL: 34 % — SIGNIFICANT CHANGE UP (ref 16–55)
IRON SATN MFR SERPL: 73 UG/DL — SIGNIFICANT CHANGE UP (ref 45–165)
IRON SATN MFR SERPL: 78 UG/DL — SIGNIFICANT CHANGE UP (ref 45–165)
MCHC RBC-ENTMCNC: 28.5 PG — SIGNIFICANT CHANGE UP (ref 27–34)
MCHC RBC-ENTMCNC: 32.3 GM/DL — SIGNIFICANT CHANGE UP (ref 32–36)
MCV RBC AUTO: 88 FL — SIGNIFICANT CHANGE UP (ref 80–100)
NRBC # BLD: 0 /100 WBCS — SIGNIFICANT CHANGE UP (ref 0–0)
PLATELET # BLD AUTO: 88 K/UL — LOW (ref 150–400)
POTASSIUM SERPL-MCNC: 4.9 MMOL/L — SIGNIFICANT CHANGE UP (ref 3.5–5.3)
POTASSIUM SERPL-SCNC: 4.9 MMOL/L — SIGNIFICANT CHANGE UP (ref 3.5–5.3)
RBC # BLD: 2.67 M/UL — LOW (ref 4.2–5.8)
RBC # FLD: 13.5 % — SIGNIFICANT CHANGE UP (ref 10.3–14.5)
SARS-COV-2 RNA SPEC QL NAA+PROBE: SIGNIFICANT CHANGE UP
SODIUM SERPL-SCNC: 137 MMOL/L — SIGNIFICANT CHANGE UP (ref 135–145)
TIBC SERPL-MCNC: 231 UG/DL — SIGNIFICANT CHANGE UP (ref 220–430)
UIBC SERPL-MCNC: 154 UG/DL — SIGNIFICANT CHANGE UP (ref 110–370)
WBC # BLD: 3.97 K/UL — SIGNIFICANT CHANGE UP (ref 3.8–10.5)
WBC # FLD AUTO: 3.97 K/UL — SIGNIFICANT CHANGE UP (ref 3.8–10.5)

## 2024-01-18 PROCEDURE — 99232 SBSQ HOSP IP/OBS MODERATE 35: CPT | Mod: GC

## 2024-01-18 PROCEDURE — 99232 SBSQ HOSP IP/OBS MODERATE 35: CPT

## 2024-01-18 RX ORDER — ISOSORBIDE MONONITRATE 60 MG/1
30 TABLET, EXTENDED RELEASE ORAL DAILY
Refills: 0 | Status: DISCONTINUED | OUTPATIENT
Start: 2024-01-18 | End: 2024-01-31

## 2024-01-18 RX ORDER — DEXTROSE 50 % IN WATER 50 %
15 SYRINGE (ML) INTRAVENOUS ONCE
Refills: 0 | Status: DISCONTINUED | OUTPATIENT
Start: 2024-01-18 | End: 2024-01-31

## 2024-01-18 RX ORDER — METOPROLOL TARTRATE 50 MG
100 TABLET ORAL DAILY
Refills: 0 | Status: DISCONTINUED | OUTPATIENT
Start: 2024-01-18 | End: 2024-01-24

## 2024-01-18 RX ORDER — HYDRALAZINE HCL 50 MG
25 TABLET ORAL THREE TIMES A DAY
Refills: 0 | Status: DISCONTINUED | OUTPATIENT
Start: 2024-01-18 | End: 2024-01-31

## 2024-01-18 RX ORDER — SODIUM BICARBONATE 1 MEQ/ML
1300 SYRINGE (ML) INTRAVENOUS THREE TIMES A DAY
Refills: 0 | Status: DISCONTINUED | OUTPATIENT
Start: 2024-01-18 | End: 2024-01-31

## 2024-01-18 RX ORDER — INSULIN LISPRO 100/ML
VIAL (ML) SUBCUTANEOUS
Refills: 0 | Status: DISCONTINUED | OUTPATIENT
Start: 2024-01-18 | End: 2024-01-31

## 2024-01-18 RX ORDER — DIVALPROEX SODIUM 500 MG/1
500 TABLET, DELAYED RELEASE ORAL
Refills: 0 | Status: DISCONTINUED | OUTPATIENT
Start: 2024-01-18 | End: 2024-01-31

## 2024-01-18 RX ORDER — HALOPERIDOL DECANOATE 100 MG/ML
1 INJECTION INTRAMUSCULAR DAILY
Refills: 0 | Status: DISCONTINUED | OUTPATIENT
Start: 2024-01-18 | End: 2024-01-31

## 2024-01-18 RX ORDER — TAMSULOSIN HYDROCHLORIDE 0.4 MG/1
0.4 CAPSULE ORAL AT BEDTIME
Refills: 0 | Status: DISCONTINUED | OUTPATIENT
Start: 2024-01-18 | End: 2024-01-31

## 2024-01-18 RX ORDER — FINASTERIDE 5 MG/1
5 TABLET, FILM COATED ORAL DAILY
Refills: 0 | Status: DISCONTINUED | OUTPATIENT
Start: 2024-01-18 | End: 2024-01-31

## 2024-01-18 RX ORDER — HALOPERIDOL DECANOATE 100 MG/ML
2 INJECTION INTRAMUSCULAR AT BEDTIME
Refills: 0 | Status: DISCONTINUED | OUTPATIENT
Start: 2024-01-18 | End: 2024-01-31

## 2024-01-18 RX ORDER — TRAZODONE HCL 50 MG
100 TABLET ORAL AT BEDTIME
Refills: 0 | Status: DISCONTINUED | OUTPATIENT
Start: 2024-01-18 | End: 2024-01-31

## 2024-01-18 RX ORDER — INSULIN GLARGINE 100 [IU]/ML
4 INJECTION, SOLUTION SUBCUTANEOUS AT BEDTIME
Refills: 0 | Status: DISCONTINUED | OUTPATIENT
Start: 2024-01-18 | End: 2024-01-31

## 2024-01-18 RX ORDER — DARBEPOETIN ALFA IN POLYSORBAT 200MCG/0.4
40 PEN INJECTOR (ML) SUBCUTANEOUS ONCE
Refills: 0 | Status: COMPLETED | OUTPATIENT
Start: 2024-01-18 | End: 2024-01-19

## 2024-01-18 RX ORDER — HALOPERIDOL DECANOATE 100 MG/ML
2 INJECTION INTRAMUSCULAR EVERY 6 HOURS
Refills: 0 | Status: DISCONTINUED | OUTPATIENT
Start: 2024-01-18 | End: 2024-01-31

## 2024-01-18 RX ORDER — CLOPIDOGREL BISULFATE 75 MG/1
75 TABLET, FILM COATED ORAL DAILY
Refills: 0 | Status: DISCONTINUED | OUTPATIENT
Start: 2024-01-18 | End: 2024-01-31

## 2024-01-18 RX ORDER — NYSTATIN CREAM 100000 [USP'U]/G
1 CREAM TOPICAL
Refills: 0 | Status: DISCONTINUED | OUTPATIENT
Start: 2024-01-18 | End: 2024-01-18

## 2024-01-18 RX ADMIN — CLOPIDOGREL BISULFATE 75 MILLIGRAM(S): 75 TABLET, FILM COATED ORAL at 15:16

## 2024-01-18 RX ADMIN — Medication 25 MILLIGRAM(S): at 06:06

## 2024-01-18 RX ADMIN — DIVALPROEX SODIUM 500 MILLIGRAM(S): 500 TABLET, DELAYED RELEASE ORAL at 06:06

## 2024-01-18 RX ADMIN — Medication 1300 MILLIGRAM(S): at 21:52

## 2024-01-18 RX ADMIN — ISOSORBIDE MONONITRATE 30 MILLIGRAM(S): 60 TABLET, EXTENDED RELEASE ORAL at 15:17

## 2024-01-18 RX ADMIN — FINASTERIDE 5 MILLIGRAM(S): 5 TABLET, FILM COATED ORAL at 15:17

## 2024-01-18 RX ADMIN — Medication 1300 MILLIGRAM(S): at 06:07

## 2024-01-18 RX ADMIN — Medication 25 MILLIGRAM(S): at 21:52

## 2024-01-18 RX ADMIN — Medication 25 MILLIGRAM(S): at 15:17

## 2024-01-18 RX ADMIN — HALOPERIDOL DECANOATE 2 MILLIGRAM(S): 100 INJECTION INTRAMUSCULAR at 21:53

## 2024-01-18 RX ADMIN — NYSTATIN CREAM 1 APPLICATION(S): 100000 CREAM TOPICAL at 06:07

## 2024-01-18 RX ADMIN — Medication 100 MILLIGRAM(S): at 21:52

## 2024-01-18 RX ADMIN — TAMSULOSIN HYDROCHLORIDE 0.4 MILLIGRAM(S): 0.4 CAPSULE ORAL at 21:53

## 2024-01-18 RX ADMIN — Medication 100 MILLIGRAM(S): at 06:07

## 2024-01-18 RX ADMIN — INSULIN GLARGINE 4 UNIT(S): 100 INJECTION, SOLUTION SUBCUTANEOUS at 21:52

## 2024-01-18 RX ADMIN — DIVALPROEX SODIUM 500 MILLIGRAM(S): 500 TABLET, DELAYED RELEASE ORAL at 18:19

## 2024-01-18 RX ADMIN — HALOPERIDOL DECANOATE 1 MILLIGRAM(S): 100 INJECTION INTRAMUSCULAR at 15:17

## 2024-01-18 RX ADMIN — Medication 1: at 08:53
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
## 2024-01-18 NOTE — PROGRESS NOTE ADULT - SUBJECTIVE AND OBJECTIVE BOX
[facility] Division of Kidney Diseases & Hypertension  FOLLOW UP NOTE  [phone]--------------------------------------------------------------------------------    Chief Complaint: Advanced CKD, hyperkalemia    24 hour events/subjective: Pt. seen and evaluated at the bedside this morning. Pt reports feeling well and wants to be discharged. Reports good urine output.    PAST HISTORY  --------------------------------------------------------------------------------  No significant changes to PMH, PSH, FHx, SHx, unless otherwise noted    ALLERGIES & MEDICATIONS  --------------------------------------------------------------------------------  Allergies  amlodipine (Other)  IV Contrast (Other)  Haldol (Other)    Intolerances    Standing Inpatient Medications    PRN Inpatient Medications    REVIEW OF SYSTEMS  --------------------------------------------------------------------------------  Gen: No fevers/chills  Skin: No rashes  Head/Eyes/Ears/Mouth: No headache  Respiratory: No dyspnea, cough  CV: No chest pain  GI: No abdominal pain, diarrhea, constipation, nausea, vomiting  : No increased frequency, dysuria, hematuria, nocturia  MSK: No joint pain, no edema  Neuro: No dizziness/lightheadedness, weakness    All other systems were reviewed and are negative, except as noted.    VITALS/PHYSICAL EXAM  --------------------------------------------------------------------------------  T(C): 36.7 (01-18-24 @ 12:14), Max: 36.7 (01-18-24 @ 12:14)  HR: 74 (01-18-24 @ 12:14) (70 - 78)  BP: 156/79 (01-18-24 @ 12:14) (142/66 - 173/74)  RR: 18 (01-18-24 @ 12:14) (17 - 18)  SpO2: 99% (01-18-24 @ 12:14) (99% - 100%)  Wt(kg): --    01-17-24 @ 07:01  -  01-18-24 @ 07:00  --------------------------------------------------------  IN: 750 mL / OUT: 3950 mL / NET: -3200 mL    Physical Exam:  General: no acute distress  Neuro: awake  HEENT: NC/AT, anicteric  Pulmonary: lungs CTA B/L  Cardiovascular/Chest: +S1S2, RRR  GI/Abdomen: soft, NT/ND, +bowel sounds  Extremities: No edema   Skin: Warm and dry    LABS/STUDIES  --------------------------------------------------------------------------------              7.6    3.97  >-----------<  88       [01-18-24 @ 07:13]              23.5     137  |  102  |  97  ----------------------------<  159      [01-18-24 @ 07:47]  4.9   |  23  |  5.91        Ca     8.4     [01-18-24 @ 07:47]    Creatinine Trend:  SCr 5.91 [01-18 @ 07:47]  SCr 6.36 [01-17 @ 07:47]  SCr 5.95 [01-15 @ 07:08]  SCr 5.47 [01-14 @ 07:25]  SCr 5.27 [01-13 @ 07:00]    Urinalysis - [01-18-24 @ 07:47]      Color  / Appearance  / SG  / pH       Gluc 159 / Ketone   / Bili  / Urobili        Blood  / Protein  / Leuk Est  / Nitrite       RBC  / WBC  / Hyaline  / Gran  / Sq Epi  / Non Sq Epi  / Bacteria     Iron 73, TIBC --, %sat --      [01-18-24 @ 07:47]  Ferritin 239      [01-18-24 @ 07:13]    HCV 12.81, Reactive      [01-12-24 @ 06:41]

## 2024-01-18 NOTE — BH CONSULTATION LIAISON PROGRESS NOTE - NSBHCHARTREVIEWINVESTIGATE_PSY_A_CORE FT
< from: 12 Lead ECG (01.04.24 @ 23:51) >      Ventricular Rate 69 BPM    Atrial Rate 69 BPM    P-R Interval 160 ms    QRS Duration 76 ms    Q-T Interval 398 ms    QTC Calculation(Bazett) 426 ms    P Axis 63 degrees    R Axis 72 degrees    T Axis 52 degrees    Diagnosis Line BASELINE ARTIFACT  NORMAL SINUS RHYTHM  NORMAL ECG  WHEN COMPARED WITH ECG OF 04-JAN-2024 12:45, (UNCONFIRMED)  NO SIGNIFICANT CHANGE WAS FOUND  Confirmed by MD Minh, Mukesh ([id number]) on 1/5/2024 7:37:24 PM    < end of copied text >

## 2024-01-18 NOTE — PROGRESS NOTE ADULT - PROBLEM SELECTOR PLAN 4
Pt with low Hgb. Iron studies reviewed and pt does not have BLAZE. Transfusion as per primary team. Monitor Hgb levels. May benefit from CAROL.      If you have any questions, please feel free to contact me.  Cesar Nugent MD  Nephrology Fellow  [phone] / [employer] Teams (Preferred)  (After 4pm or on weekends, please call the on-call Fellow)

## 2024-01-18 NOTE — BH CONSULTATION LIAISON PROGRESS NOTE - NSBHFUPINTERVALHXFT_PSY_A_CORE
Pt. seen and evaluated, chart, labs, meds reviewed. Patient remains irritable and labile, albeit less so than he was on admission. He is grumbling a lot, but not acting out. Overall, he is compliant with treatment. His sleep is fair, appetite limited. No SI/HI. Denies psychosis. No PRNs.    As per nursing, not a management problem overnight.

## 2024-01-18 NOTE — PROGRESS NOTE ADULT - PROBLEM SELECTOR PLAN 1
Pt with chronic kidney disease, known to be stage 5 from uncontrolled HTN. Was recently admitted to [facility] for similar reasons and noted to have a Scr of 5.04. Now here with BUN/Cr of 90/5 on (1/8) stable from last admission.    Scr stable/elevated at 5.91 today with stable BUN 80-90s.  UOP ~4L in last 24 hours.  Please monitor serial bladder scan to ensure no retention.   Ensure low potassium/renal diet.   No indication for dialysis initiation at this time.

## 2024-01-18 NOTE — PROGRESS NOTE ADULT - ATTENDING COMMENTS
Advanced CKD: Non oliguric  Kidney function stable. No e/o uremia or fluid overload.   No e/o retention    K in range  Maintain sodium bicarbonate    Also noted to be anemic  Iron stores oK  Can start aranesp 40 mcg X 1 dose today      Rest as per Dr. Raffi Rubin MD  O: [phone]  Contact me on teams

## 2024-01-18 NOTE — PROGRESS NOTE ADULT - PROBLEM SELECTOR PLAN 2
Elevated potassium 5.7 on 1/11, but improved with lokelma 10 TID. Last dose on 1/12. Serum potassium now WNL. Ensure low potassium diet. Monitor serum potassium.

## 2024-01-18 NOTE — PROGRESS NOTE ADULT - ASSESSMENT
77 yo male h/o schizophrenia, dementia, agitation, ckd, dm, htn, presenting to ER with agitation    schizophrenia/dementia/agitation  psy following  plan for inpt psy admission 2PC  psy meds as per psy  d/w psych attending    pt needs constant observation and will be 2pc to inpt psy facility     ckd  stable   creat at baseline  pt makes urine  no need for urgent HD  lokelma given for hyperKalemia today  to f/u with outpt nephrologist as noted in nephrology consult note from 12/27/23  renal f/u noted  no plan for dialysis at this time. pt will need weekly bmp. as long as creat remains stable and bmp wnl then no plans to start HD.    will need routine f/u with outpt nephrologist     hyperkalemia  s/p lokelma, calcium, lasix  potassium improved  cont monitor    HTN  resume outpt bp meds    anemia  chronic  due to ckd  iron supp  monitor     dm  lantus and iss   monitor fs  decreased lantus to 4units given hypoglycemic event     pt medically stable for dc to inpt psy facility   currently pt DOES NOT need dialysis     Advanced care planning was discussed with patient and family.  Advanced care planning forms were reviewed and discussed as appropriate.  Differential diagnosis and plan of care discussed with patient after the evaluation.   Pain assessed and judicious use of narcotics when appropriate was discussed.  Importance of Fall prevention discussed.  Counseling on Smoking and Alcohol cessation was offered when appropriate.  Counseling on Diet, exercise, and medication compliance was done.       Approx 75 minutes spent.
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
## 2024-01-19 LAB
GLUCOSE BLDC GLUCOMTR-MCNC: 105 MG/DL — HIGH (ref 70–99)
GLUCOSE BLDC GLUCOMTR-MCNC: 210 MG/DL — HIGH (ref 70–99)
GLUCOSE BLDC GLUCOMTR-MCNC: 214 MG/DL — HIGH (ref 70–99)
GLUCOSE BLDC GLUCOMTR-MCNC: 92 MG/DL — SIGNIFICANT CHANGE UP (ref 70–99)

## 2024-01-19 PROCEDURE — 99232 SBSQ HOSP IP/OBS MODERATE 35: CPT | Mod: GC

## 2024-01-19 PROCEDURE — 99232 SBSQ HOSP IP/OBS MODERATE 35: CPT

## 2024-01-19 RX ORDER — LANOLIN ALCOHOL/MO/W.PET/CERES
3 CREAM (GRAM) TOPICAL AT BEDTIME
Refills: 0 | Status: DISCONTINUED | OUTPATIENT
Start: 2024-01-19 | End: 2024-01-31

## 2024-01-19 RX ADMIN — Medication 100 MILLIGRAM(S): at 21:27

## 2024-01-19 RX ADMIN — Medication 1300 MILLIGRAM(S): at 13:08

## 2024-01-19 RX ADMIN — Medication 1300 MILLIGRAM(S): at 06:09

## 2024-01-19 RX ADMIN — Medication 25 MILLIGRAM(S): at 13:06

## 2024-01-19 RX ADMIN — HALOPERIDOL DECANOATE 1 MILLIGRAM(S): 100 INJECTION INTRAMUSCULAR at 13:07

## 2024-01-19 RX ADMIN — Medication 25 MILLIGRAM(S): at 21:26

## 2024-01-19 RX ADMIN — Medication 100 MILLIGRAM(S): at 06:10

## 2024-01-19 RX ADMIN — CLOPIDOGREL BISULFATE 75 MILLIGRAM(S): 75 TABLET, FILM COATED ORAL at 13:07

## 2024-01-19 RX ADMIN — DIVALPROEX SODIUM 500 MILLIGRAM(S): 500 TABLET, DELAYED RELEASE ORAL at 06:10

## 2024-01-19 RX ADMIN — Medication 40 MICROGRAM(S): at 06:10

## 2024-01-19 RX ADMIN — FINASTERIDE 5 MILLIGRAM(S): 5 TABLET, FILM COATED ORAL at 13:08

## 2024-01-19 RX ADMIN — Medication 1300 MILLIGRAM(S): at 21:26

## 2024-01-19 RX ADMIN — INSULIN GLARGINE 4 UNIT(S): 100 INJECTION, SOLUTION SUBCUTANEOUS at 21:29

## 2024-01-19 RX ADMIN — Medication 2: at 13:06

## 2024-01-19 RX ADMIN — DIVALPROEX SODIUM 500 MILLIGRAM(S): 500 TABLET, DELAYED RELEASE ORAL at 17:53

## 2024-01-19 RX ADMIN — TAMSULOSIN HYDROCHLORIDE 0.4 MILLIGRAM(S): 0.4 CAPSULE ORAL at 21:26

## 2024-01-19 RX ADMIN — Medication 25 MILLIGRAM(S): at 06:10

## 2024-01-19 RX ADMIN — ISOSORBIDE MONONITRATE 30 MILLIGRAM(S): 60 TABLET, EXTENDED RELEASE ORAL at 13:07

## 2024-01-19 RX ADMIN — HALOPERIDOL DECANOATE 2 MILLIGRAM(S): 100 INJECTION INTRAMUSCULAR at 21:26

## 2024-01-19 NOTE — PROGRESS NOTE ADULT - ASSESSMENT
79 yo male h/o schizophrenia, dementia, agitation, ckd, dm, htn, presenting to ER with agitation    schizophrenia/dementia/agitation  psy following  plan for inpt psy admission 2PC  psy meds as per psy  d/w psych attending    pt needs constant observation and will be 2pc to inpt psy facility     ckd  stable   creat at baseline  pt makes urine  no need for urgent HD  lokelma given for hyperKalemia today  to f/u with outpt nephrologist as noted in nephrology consult note from 12/27/23  renal f/u noted  no plan for dialysis at this time. pt will need weekly bmp. as long as creat remains stable and bmp wnl then no plans to start HD.    will need routine f/u with outpt nephrologist     hyperkalemia  s/p lokelma, calcium, lasix  potassium improved  cont monitor    HTN  resume outpt bp meds    anemia  chronic  due to ckd  iron supp  monitor     dm  lantus and iss   monitor fs  decreased lantus to 4units given hypoglycemic event     pt medically stable for dc to inpt psy facility   currently pt DOES NOT need dialysis     Advanced care planning was discussed with patient and family.  Advanced care planning forms were reviewed and discussed as appropriate.  Differential diagnosis and plan of care discussed with patient after the evaluation.   Pain assessed and judicious use of narcotics when appropriate was discussed.  Importance of Fall prevention discussed.  Counseling on Smoking and Alcohol cessation was offered when appropriate.  Counseling on Diet, exercise, and medication compliance was done.       Approx 75 minutes spent.
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
## 2024-01-19 NOTE — PROGRESS NOTE ADULT - ATTENDING COMMENTS
Advanced CKD: Non oliguric  No labs noted today  Kidney function stable. No e/o uremia or fluid overload.   No e/o retention    K in range  Maintain sodium bicarbonate  No acute indication to start dialysis     Also noted to be anemic  Iron stores oK  Can start aranesp 40 mcg X 1 dose ( 1/18)    HTN: Monitor on BP meds  If remains high, can increase hydralazine      Rest as per Dr. Raffi Rubin MD  O: [phone]  Contact me on teams

## 2024-01-19 NOTE — PROGRESS NOTE ADULT - PROBLEM SELECTOR PLAN 4
Pt with low Hgb. Iron studies reviewed and pt does not have BLAZE. Transfusion as per primary team. Monitor Hgb levels. May benefit from CAROL.      If you have any questions, please feel free to contact me.  Cesar Nugent MD  Nephrology Fellow  [phone] / [employer] Teams (Preferred)  (After 4pm or on weekends, please call the on-call Fellow) Pt with low Hgb. Iron studies reviewed and pt does not have BLAZE. Transfusion as per primary team. Monitor Hgb levels. Received Aranesp ( 1/18).      If you have any questions, please feel free to contact me.  Cesar Nugent MD  Nephrology Fellow  [phone] / [employer] Teams (Preferred)  (After 4pm or on weekends, please call the on-call Fellow)

## 2024-01-19 NOTE — PROGRESS NOTE ADULT - PROBLEM SELECTOR PLAN 1
Pt with chronic kidney disease, known to be stage 5 from uncontrolled HTN. Was recently admitted to [facility] for similar reasons and noted to have a Scr of 5.04. Now here with BUN/Cr of 90/5 on (1/8) stable from last admission.    Scr stable/elevated at 5.91 yesterda with stable BUN 80-90s. No labs resulted yet today.  UOP ~1L in last 24 hours.  Please monitor serial bladder scan to ensure no retention.   Ensure low potassium/renal diet.   No indication for dialysis initiation at this time.
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
## 2024-01-19 NOTE — PROGRESS NOTE ADULT - SUBJECTIVE AND OBJECTIVE BOX
[facility] Division of Kidney Diseases & Hypertension  FOLLOW UP NOTE  [phone]--------------------------------------------------------------------------------    Chief Complaint: Advanced CKD, hyperkalemia    24 hour events/subjective: Pt. seen and evaluated at the bedside this morning. Pt reports feeling well.    PAST HISTORY  --------------------------------------------------------------------------------  No significant changes to PMH, PSH, FHx, SHx, unless otherwise noted    ALLERGIES & MEDICATIONS  --------------------------------------------------------------------------------  Allergies  amlodipine (Other)  IV Contrast (Other)  Haldol (Other)    Intolerances    Standing Inpatient Medications  clopidogrel Tablet 75 milliGRAM(s) Oral daily  divalproex  milliGRAM(s) Oral two times a day  finasteride 5 milliGRAM(s) Oral daily  haloperidol     Tablet 2 milliGRAM(s) Oral at bedtime  haloperidol     Tablet 1 milliGRAM(s) Oral daily  hydrALAZINE 25 milliGRAM(s) Oral three times a day  insulin glargine Injectable (LANTUS) 4 Unit(s) SubCutaneous at bedtime  insulin lispro (ADMELOG) corrective regimen sliding scale   SubCutaneous three times a day before meals  isosorbide   mononitrate ER Tablet (IMDUR) 30 milliGRAM(s) Oral daily  melatonin 3 milliGRAM(s) Oral at bedtime  metoprolol tartrate 100 milliGRAM(s) Oral daily  sodium bicarbonate 1300 milliGRAM(s) Oral three times a day  tamsulosin 0.4 milliGRAM(s) Oral at bedtime  traZODone 100 milliGRAM(s) Oral at bedtime    PRN Inpatient Medications  dextrose Oral Gel 15 Gram(s) Oral once PRN  haloperidol    Injectable 2 milliGRAM(s) IV Push every 6 hours PRN    REVIEW OF SYSTEMS  --------------------------------------------------------------------------------  Gen: No fevers/chills  Skin: No rashes  Head/Eyes/Ears/Mouth: No headache  Respiratory: No dyspnea, cough  CV: No chest pain  GI: No abdominal pain, diarrhea, constipation, nausea, vomiting  : No increased frequency, dysuria, hematuria, nocturia  MSK: No joint pain, no edema  Neuro: No dizziness/lightheadedness, weakness    All other systems were reviewed and are negative, except as noted.    VITALS/PHYSICAL EXAM  --------------------------------------------------------------------------------  T(C): 36.2 (01-19-24 @ 12:50), Max: 36.8 (01-18-24 @ 21:30)  HR: 79 (01-19-24 @ 12:50) (73 - 83)  BP: 176/75 (01-19-24 @ 12:50) (110/71 - 176/75)  RR: 18 (01-19-24 @ 12:50) (17 - 18)  SpO2: 100% (01-19-24 @ 12:50) (97% - 100%)  Wt(kg): --    01-18-24 @ 07:01  -  01-19-24 @ 07:00  --------------------------------------------------------  IN: 0 mL / OUT: 1000 mL / NET: -1000 mL    Physical Exam:  General: no acute distress  Neuro: awake  HEENT: NC/AT, anicteric  Pulmonary: lungs CTA B/L  Cardiovascular/Chest: +S1S2, RRR  GI/Abdomen: soft, NT/ND, +bowel sounds  Extremities: No edema   Skin: Warm and dry    LABS/STUDIES  --------------------------------------------------------------------------------              7.6    3.97  >-----------<  88       [01-18-24 @ 07:13]              23.5     137  |  102  |  97  ----------------------------<  159      [01-18-24 @ 07:47]  4.9   |  23  |  5.91        Ca     8.4     [01-18-24 @ 07:47]    Creatinine Trend:  SCr 5.91 [01-18 @ 07:47]  SCr 6.36 [01-17 @ 07:47]  SCr 5.95 [01-15 @ 07:08]  SCr 5.47 [01-14 @ 07:25]  SCr 5.27 [01-13 @ 07:00]    Urinalysis - [01-18-24 @ 07:47]      Color  / Appearance  / SG  / pH       Gluc 159 / Ketone   / Bili  / Urobili        Blood  / Protein  / Leuk Est  / Nitrite       RBC  / WBC  / Hyaline  / Gran  / Sq Epi  / Non Sq Epi  / Bacteria     Iron 73, TIBC --, %sat --      [01-18-24 @ 07:47]  Ferritin 239      [01-18-24 @ 07:13]

## 2024-01-19 NOTE — BH CONSULTATION LIAISON PROGRESS NOTE - NSBHFUPINTERVALHXFT_PSY_A_CORE
Pt. seen and evaluated, chart, labs, meds reviewed. Patient cooperative, less irritable and labile, considerably less so than he was on admission. He is grumbling and complaining a lot, but not acting out. Overall, he is compliant with treatment. His sleep is fair, appetite limited. No SI/HI. Denies psychosis. No PRNs.    As per nursing, not a management problem overnight.

## 2024-01-19 NOTE — BH CONSULTATION LIAISON PROGRESS NOTE - NSBHCHARTREVIEWINVESTIGATE_PSY_A_CORE FT
< from: 12 Lead ECG (01.04.24 @ 23:51) >      Ventricular Rate 69 BPM    Atrial Rate 69 BPM    P-R Interval 160 ms    QRS Duration 76 ms    Q-T Interval 398 ms    QTC Calculation(Bazett) 426 ms    P Axis 63 degrees    R Axis 72 degrees    T Axis 52 degrees    Diagnosis Line BASELINE ARTIFACT  NORMAL SINUS RHYTHM  NORMAL ECG  WHEN COMPARED WITH ECG OF 04-JAN-2024 12:45, (UNCONFIRMED)  NO SIGNIFICANT CHANGE WAS FOUND  Confirmed by MD Minh, Mukesh ([id number]) on 1/5/2024 7:37:24 PM    < end of copied text >

## 2024-01-19 NOTE — PROGRESS NOTE ADULT - PROBLEM SELECTOR PROBLEM 3
Low serum bicarbonate

## 2024-01-19 NOTE — PROGRESS NOTE ADULT - PROBLEM SELECTOR PROBLEM 1
Stage 5 chronic kidney disease not on chronic dialysis

## 2024-01-20 LAB
GLUCOSE BLDC GLUCOMTR-MCNC: 134 MG/DL — HIGH (ref 70–99)
GLUCOSE BLDC GLUCOMTR-MCNC: 153 MG/DL — HIGH (ref 70–99)
GLUCOSE BLDC GLUCOMTR-MCNC: 177 MG/DL — HIGH (ref 70–99)
GLUCOSE BLDC GLUCOMTR-MCNC: 242 MG/DL — HIGH (ref 70–99)

## 2024-01-20 PROCEDURE — 99232 SBSQ HOSP IP/OBS MODERATE 35: CPT

## 2024-01-20 RX ADMIN — HALOPERIDOL DECANOATE 2 MILLIGRAM(S): 100 INJECTION INTRAMUSCULAR at 21:47

## 2024-01-20 RX ADMIN — Medication 100 MILLIGRAM(S): at 05:19

## 2024-01-20 RX ADMIN — Medication 1300 MILLIGRAM(S): at 05:20

## 2024-01-20 RX ADMIN — Medication 1300 MILLIGRAM(S): at 21:46

## 2024-01-20 RX ADMIN — Medication 1300 MILLIGRAM(S): at 13:12

## 2024-01-20 RX ADMIN — Medication 2: at 17:52

## 2024-01-20 RX ADMIN — DIVALPROEX SODIUM 500 MILLIGRAM(S): 500 TABLET, DELAYED RELEASE ORAL at 17:53

## 2024-01-20 RX ADMIN — INSULIN GLARGINE 4 UNIT(S): 100 INJECTION, SOLUTION SUBCUTANEOUS at 21:45

## 2024-01-20 RX ADMIN — HALOPERIDOL DECANOATE 1 MILLIGRAM(S): 100 INJECTION INTRAMUSCULAR at 13:12

## 2024-01-20 RX ADMIN — CLOPIDOGREL BISULFATE 75 MILLIGRAM(S): 75 TABLET, FILM COATED ORAL at 13:12

## 2024-01-20 RX ADMIN — ISOSORBIDE MONONITRATE 30 MILLIGRAM(S): 60 TABLET, EXTENDED RELEASE ORAL at 13:11

## 2024-01-20 RX ADMIN — TAMSULOSIN HYDROCHLORIDE 0.4 MILLIGRAM(S): 0.4 CAPSULE ORAL at 21:46

## 2024-01-20 RX ADMIN — Medication 1: at 09:09

## 2024-01-20 RX ADMIN — Medication 100 MILLIGRAM(S): at 21:46

## 2024-01-20 RX ADMIN — Medication 3 MILLIGRAM(S): at 21:46

## 2024-01-20 RX ADMIN — Medication 25 MILLIGRAM(S): at 21:47

## 2024-01-20 RX ADMIN — FINASTERIDE 5 MILLIGRAM(S): 5 TABLET, FILM COATED ORAL at 13:12

## 2024-01-20 RX ADMIN — Medication 25 MILLIGRAM(S): at 13:11

## 2024-01-20 RX ADMIN — DIVALPROEX SODIUM 500 MILLIGRAM(S): 500 TABLET, DELAYED RELEASE ORAL at 05:19

## 2024-01-20 RX ADMIN — Medication 25 MILLIGRAM(S): at 05:19

## 2024-01-20 NOTE — BH CONSULTATION LIAISON PROGRESS NOTE - NSBHCHARTREVIEWINVESTIGATE_PSY_A_CORE FT
< from: 12 Lead ECG (01.04.24 @ 23:51) >      Ventricular Rate 69 BPM    Atrial Rate 69 BPM    P-R Interval 160 ms    QRS Duration 76 ms    Q-T Interval 398 ms    QTC Calculation(Bazett) 426 ms    P Axis 63 degrees    R Axis 72 degrees    T Axis 52 degrees    Diagnosis Line BASELINE ARTIFACT  NORMAL SINUS RHYTHM  NORMAL ECG  WHEN COMPARED WITH ECG OF 04-JAN-2024 12:45, (UNCONFIRMED)  NO SIGNIFICANT CHANGE WAS FOUND  Confirmed by MD Minh, Mukesh ([id number]) on 1/5/2024 7:37:24 PM    < end of copied text >
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
## 2024-01-20 NOTE — BH CONSULTATION LIAISON PROGRESS NOTE - NSBHFUPINTERVALHXFT_PSY_A_CORE
Pt. seen and evaluated, chart, labs, meds reviewed. Patient remains cooperative, less irritable and labile, considerably less so than he was on admission. He is grumbling and complaining a lot less and not acting out. Overall, he is compliant with treatment. His sleep is fair, appetite limited. No SI/HI. Denies psychosis. No PRNs. He did well off 1:1.    As per nursing, not a management problem overnight.
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
## 2024-01-20 NOTE — PROGRESS NOTE ADULT - ASSESSMENT
77 yo male h/o schizophrenia, dementia, agitation, ckd, dm, htn, presenting to ER with agitation    schizophrenia/dementia/agitation  psy following  plan for inpt psy admission 2PC  psy meds as per psy  d/w psych attending    pt needs constant observation and will be 2pc to inpt psy facility     ckd  stable   creat at baseline  pt makes urine  no need for urgent HD  lokelma given for hyperKalemia today  to f/u with outpt nephrologist as noted in nephrology consult note from 12/27/23  renal f/u noted  no plan for dialysis at this time. pt will need weekly bmp. as long as creat remains stable and bmp wnl then no plans to start HD.    will need routine f/u with outpt nephrologist     hyperkalemia  s/p lokelma, calcium, lasix  potassium improved  cont monitor    HTN  resume outpt bp meds    anemia  chronic  due to ckd  iron supp  monitor     dm  lantus and iss   monitor fs  decreased lantus to 4units given hypoglycemic event     pt medically stable for dc to inpt psy facility   currently pt DOES NOT need dialysis           Approx 35 minutes spent.

## 2024-01-21 LAB
GLUCOSE BLDC GLUCOMTR-MCNC: 108 MG/DL — HIGH (ref 70–99)
GLUCOSE BLDC GLUCOMTR-MCNC: 113 MG/DL — HIGH (ref 70–99)
GLUCOSE BLDC GLUCOMTR-MCNC: 118 MG/DL — HIGH (ref 70–99)
GLUCOSE BLDC GLUCOMTR-MCNC: 135 MG/DL — HIGH (ref 70–99)

## 2024-01-21 PROCEDURE — 99232 SBSQ HOSP IP/OBS MODERATE 35: CPT

## 2024-01-21 RX ADMIN — Medication 100 MILLIGRAM(S): at 21:38

## 2024-01-21 RX ADMIN — HALOPERIDOL DECANOATE 2 MILLIGRAM(S): 100 INJECTION INTRAMUSCULAR at 21:38

## 2024-01-21 RX ADMIN — Medication 25 MILLIGRAM(S): at 13:13

## 2024-01-21 RX ADMIN — Medication 1300 MILLIGRAM(S): at 21:38

## 2024-01-21 RX ADMIN — DIVALPROEX SODIUM 500 MILLIGRAM(S): 500 TABLET, DELAYED RELEASE ORAL at 05:06

## 2024-01-21 RX ADMIN — Medication 1300 MILLIGRAM(S): at 05:06

## 2024-01-21 RX ADMIN — INSULIN GLARGINE 4 UNIT(S): 100 INJECTION, SOLUTION SUBCUTANEOUS at 21:39

## 2024-01-21 RX ADMIN — CLOPIDOGREL BISULFATE 75 MILLIGRAM(S): 75 TABLET, FILM COATED ORAL at 08:38

## 2024-01-21 RX ADMIN — Medication 25 MILLIGRAM(S): at 21:39

## 2024-01-21 RX ADMIN — FINASTERIDE 5 MILLIGRAM(S): 5 TABLET, FILM COATED ORAL at 08:37

## 2024-01-21 RX ADMIN — Medication 3 MILLIGRAM(S): at 21:38

## 2024-01-21 RX ADMIN — Medication 25 MILLIGRAM(S): at 05:06

## 2024-01-21 RX ADMIN — ISOSORBIDE MONONITRATE 30 MILLIGRAM(S): 60 TABLET, EXTENDED RELEASE ORAL at 08:40

## 2024-01-21 RX ADMIN — Medication 100 MILLIGRAM(S): at 05:06

## 2024-01-21 RX ADMIN — DIVALPROEX SODIUM 500 MILLIGRAM(S): 500 TABLET, DELAYED RELEASE ORAL at 17:14

## 2024-01-21 RX ADMIN — HALOPERIDOL DECANOATE 1 MILLIGRAM(S): 100 INJECTION INTRAMUSCULAR at 08:38

## 2024-01-21 RX ADMIN — Medication 1300 MILLIGRAM(S): at 13:13

## 2024-01-21 RX ADMIN — TAMSULOSIN HYDROCHLORIDE 0.4 MILLIGRAM(S): 0.4 CAPSULE ORAL at 21:38
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
## 2024-01-21 NOTE — BH CONSULTATION LIAISON PROGRESS NOTE - NSBHCHARTREVIEWINVESTIGATE_PSY_A_CORE FT
< from: 12 Lead ECG (01.04.24 @ 23:51) >      Ventricular Rate 69 BPM    Atrial Rate 69 BPM    P-R Interval 160 ms    QRS Duration 76 ms    Q-T Interval 398 ms    QTC Calculation(Bazett) 426 ms    P Axis 63 degrees    R Axis 72 degrees    T Axis 52 degrees    Diagnosis Line BASELINE ARTIFACT  NORMAL SINUS RHYTHM  NORMAL ECG  WHEN COMPARED WITH ECG OF 04-JAN-2024 12:45, (UNCONFIRMED)  NO SIGNIFICANT CHANGE WAS FOUND  Confirmed by MD Minh, Mukesh ([id number]) on 1/5/2024 7:37:24 PM    < end of copied text >

## 2024-01-21 NOTE — PROGRESS NOTE ADULT - ASSESSMENT
79 yo male h/o schizophrenia, dementia, agitation, ckd, dm, htn, presenting to ER with agitation    schizophrenia/dementia/agitation  psy following  plan for inpt psy admission 2PC  psy meds as per psy  d/w psych attending    pt needs constant observation and will be 2pc to inpt psy facility     ckd  stable   creat at baseline  pt makes urine  no need for urgent HD  lokelma given for hyperKalemia today  to f/u with outpt nephrologist as noted in nephrology consult note from 12/27/23  renal f/u noted  no plan for dialysis at this time. pt will need weekly bmp. as long as creat remains stable and bmp wnl then no plans to start HD.    will need routine f/u with outpt nephrologist     hyperkalemia  s/p lokelma, calcium, lasix  potassium improved  cont monitor    HTN  resume outpt bp meds    anemia  chronic  due to ckd  iron supp  monitor     dm  lantus and iss   monitor fs  decreased lantus to 4units given hypoglycemic event     pt medically stable for dc to inpt psy facility   currently pt DOES NOT need dialysis           Approx 35 minutes spent.  Seen in coverage for Dr Granger

## 2024-01-21 NOTE — BH CONSULTATION LIAISON PROGRESS NOTE - NSBHFUPINTERVALHXFT_PSY_A_CORE
Pt. seen and evaluated, chart, labs, meds reviewed. Patient remains cooperative, less irritable and labile, considerably less so than he was on admission. He is no longer grumbling and complaining, not acting out, appears somewhat pleasant explaining that he will need an ear surgery. Overall, he is compliant with treatment. His sleep is fair, appetite limited. No SI/HI. Denies psychosis. No PRNs. He continues to do well off 1:1.    As per nursing, not a management problem overnight.

## 2024-01-22 LAB
GLUCOSE BLDC GLUCOMTR-MCNC: 122 MG/DL — HIGH (ref 70–99)
GLUCOSE BLDC GLUCOMTR-MCNC: 197 MG/DL — HIGH (ref 70–99)

## 2024-01-22 PROCEDURE — 99231 SBSQ HOSP IP/OBS SF/LOW 25: CPT

## 2024-01-22 RX ADMIN — Medication 100 MILLIGRAM(S): at 21:34

## 2024-01-22 RX ADMIN — Medication 25 MILLIGRAM(S): at 14:07

## 2024-01-22 RX ADMIN — DIVALPROEX SODIUM 500 MILLIGRAM(S): 500 TABLET, DELAYED RELEASE ORAL at 05:03

## 2024-01-22 RX ADMIN — TAMSULOSIN HYDROCHLORIDE 0.4 MILLIGRAM(S): 0.4 CAPSULE ORAL at 21:33

## 2024-01-22 RX ADMIN — Medication 25 MILLIGRAM(S): at 05:03

## 2024-01-22 RX ADMIN — ISOSORBIDE MONONITRATE 30 MILLIGRAM(S): 60 TABLET, EXTENDED RELEASE ORAL at 11:15

## 2024-01-22 RX ADMIN — Medication 100 MILLIGRAM(S): at 05:03

## 2024-01-22 RX ADMIN — INSULIN GLARGINE 4 UNIT(S): 100 INJECTION, SOLUTION SUBCUTANEOUS at 22:45

## 2024-01-22 RX ADMIN — DIVALPROEX SODIUM 500 MILLIGRAM(S): 500 TABLET, DELAYED RELEASE ORAL at 18:05

## 2024-01-22 RX ADMIN — Medication 1300 MILLIGRAM(S): at 05:03

## 2024-01-22 RX ADMIN — Medication 3 MILLIGRAM(S): at 21:33

## 2024-01-22 RX ADMIN — Medication 25 MILLIGRAM(S): at 21:34

## 2024-01-22 RX ADMIN — CLOPIDOGREL BISULFATE 75 MILLIGRAM(S): 75 TABLET, FILM COATED ORAL at 11:16

## 2024-01-22 RX ADMIN — HALOPERIDOL DECANOATE 1 MILLIGRAM(S): 100 INJECTION INTRAMUSCULAR at 11:16

## 2024-01-22 RX ADMIN — FINASTERIDE 5 MILLIGRAM(S): 5 TABLET, FILM COATED ORAL at 11:16

## 2024-01-22 RX ADMIN — Medication 1300 MILLIGRAM(S): at 14:07

## 2024-01-22 RX ADMIN — Medication 1300 MILLIGRAM(S): at 21:34

## 2024-01-22 RX ADMIN — HALOPERIDOL DECANOATE 2 MILLIGRAM(S): 100 INJECTION INTRAMUSCULAR at 21:34

## 2024-01-22 NOTE — PROGRESS NOTE ADULT - ASSESSMENT
79 yo male h/o schizophrenia, dementia, agitation, ckd, dm, htn, presenting to ER with agitation    schizophrenia/dementia/agitation  psy following  plan for inpt psy admission 2PC  psy meds as per psy  d/w psych attending    pt needs constant observation and will be 2pc to inpt psy facility  As per psych  and care coordination Patient will need a level 2 psych eval prior to DC     ckd  stable   creat at baseline  pt makes urine  no need for urgent HD  lokelma given for hyperKalemia today  to f/u with outpt nephrologist as noted in nephrology consult note from 12/27/23  renal f/u noted  no plan for dialysis at this time. pt will need weekly bmp. as long as creat remains stable and bmp wnl then no plans to start HD.    will need routine f/u with outpt nephrologist     hyperkalemia  s/p lokelma, calcium, lasix  potassium improved  cont monitor    HTN  resume outpt bp meds    anemia  chronic  due to ckd  iron supp  monitor     dm  lantus and iss   monitor fs  decreased lantus to 4units given hypoglycemic event     pt medically stable for dc to inpt psy facility   currently pt DOES NOT need dialysis           Approx 35 minutes spent.  Seen in coverage for Dr Granger
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
## 2024-01-22 NOTE — BH CONSULTATION LIAISON PROGRESS NOTE - NSBHCHARTREVIEWINVESTIGATE_PSY_A_CORE FT
< from: 12 Lead ECG (01.04.24 @ 23:51) >      Ventricular Rate 69 BPM    Atrial Rate 69 BPM    P-R Interval 160 ms    QRS Duration 76 ms    Q-T Interval 398 ms    QTC Calculation(Bazett) 426 ms    P Axis 63 degrees    R Axis 72 degrees    T Axis 52 degrees    Diagnosis Line BASELINE ARTIFACT  NORMAL SINUS RHYTHM  NORMAL ECG  WHEN COMPARED WITH ECG OF 04-JAN-2024 12:45, (UNCONFIRMED)  NO SIGNIFICANT CHANGE WAS FOUND  Confirmed by MD Minh, Mukesh ([id number]) on 1/5/2024 7:37:24 PM    < end of copied text >
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
## 2024-01-22 NOTE — BH CONSULTATION LIAISON PROGRESS NOTE - NSBHFUPINTERVALHXFT_PSY_A_CORE
Pt. seen and evaluated, chart, labs, meds reviewed. Patient remains cooperativs, guarded, yelling times. pt states he needs an ativan, upset dealing with staff. pt refusing to answer most questions. No SI/HI. Denies psychosis. No PRNs.     As per nursing, not a management problem overnight.

## 2024-01-23 LAB
GLUCOSE BLDC GLUCOMTR-MCNC: 109 MG/DL — HIGH (ref 70–99)
GLUCOSE BLDC GLUCOMTR-MCNC: 117 MG/DL — HIGH (ref 70–99)
GLUCOSE BLDC GLUCOMTR-MCNC: 149 MG/DL — HIGH (ref 70–99)
GLUCOSE BLDC GLUCOMTR-MCNC: 157 MG/DL — HIGH (ref 70–99)

## 2024-01-23 PROCEDURE — 99231 SBSQ HOSP IP/OBS SF/LOW 25: CPT

## 2024-01-23 PROCEDURE — 99232 SBSQ HOSP IP/OBS MODERATE 35: CPT

## 2024-01-23 RX ADMIN — HALOPERIDOL DECANOATE 1 MILLIGRAM(S): 100 INJECTION INTRAMUSCULAR at 11:25

## 2024-01-23 RX ADMIN — Medication 1300 MILLIGRAM(S): at 22:03

## 2024-01-23 RX ADMIN — Medication 1300 MILLIGRAM(S): at 13:23

## 2024-01-23 RX ADMIN — Medication 100 MILLIGRAM(S): at 06:02

## 2024-01-23 RX ADMIN — ISOSORBIDE MONONITRATE 30 MILLIGRAM(S): 60 TABLET, EXTENDED RELEASE ORAL at 11:25

## 2024-01-23 RX ADMIN — FINASTERIDE 5 MILLIGRAM(S): 5 TABLET, FILM COATED ORAL at 11:26

## 2024-01-23 RX ADMIN — HALOPERIDOL DECANOATE 2 MILLIGRAM(S): 100 INJECTION INTRAMUSCULAR at 22:04

## 2024-01-23 RX ADMIN — Medication 25 MILLIGRAM(S): at 22:02

## 2024-01-23 RX ADMIN — Medication 1300 MILLIGRAM(S): at 06:00

## 2024-01-23 RX ADMIN — DIVALPROEX SODIUM 500 MILLIGRAM(S): 500 TABLET, DELAYED RELEASE ORAL at 17:31

## 2024-01-23 RX ADMIN — Medication 1: at 09:02

## 2024-01-23 RX ADMIN — TAMSULOSIN HYDROCHLORIDE 0.4 MILLIGRAM(S): 0.4 CAPSULE ORAL at 22:01

## 2024-01-23 RX ADMIN — Medication 25 MILLIGRAM(S): at 06:01

## 2024-01-23 RX ADMIN — Medication 25 MILLIGRAM(S): at 13:24

## 2024-01-23 RX ADMIN — Medication 100 MILLIGRAM(S): at 22:05

## 2024-01-23 RX ADMIN — CLOPIDOGREL BISULFATE 75 MILLIGRAM(S): 75 TABLET, FILM COATED ORAL at 11:25

## 2024-01-23 RX ADMIN — Medication 3 MILLIGRAM(S): at 22:04

## 2024-01-23 RX ADMIN — INSULIN GLARGINE 4 UNIT(S): 100 INJECTION, SOLUTION SUBCUTANEOUS at 22:01

## 2024-01-23 RX ADMIN — DIVALPROEX SODIUM 500 MILLIGRAM(S): 500 TABLET, DELAYED RELEASE ORAL at 06:02

## 2024-01-23 NOTE — PROGRESS NOTE ADULT - ASSESSMENT
Advanced CKD: Non oliguric  No labs today  Kidney function stable. No e/o uremia or fluid overload.   No e/o retention    K in range  Maintain sodium bicarbonate  No acute indication to start dialysis     Also noted to be anemic  Iron stores oK  Received    aranesp 40 mcg X 1 dose ( 1/18)  Will give another dose this week    HTN: Monitor on BP meds  Consider increasing Metoprolol by 50 mg      Mandi Rubin MD  O: [phone]  Contact me on teams
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
## 2024-01-23 NOTE — BH CONSULTATION LIAISON PROGRESS NOTE - NSBHCHARTREVIEWINVESTIGATE_PSY_A_CORE FT
< from: 12 Lead ECG (01.04.24 @ 23:51) >      Ventricular Rate 69 BPM    Atrial Rate 69 BPM    P-R Interval 160 ms    QRS Duration 76 ms    Q-T Interval 398 ms    QTC Calculation(Bazett) 426 ms    P Axis 63 degrees    R Axis 72 degrees    T Axis 52 degrees    Diagnosis Line BASELINE ARTIFACT  NORMAL SINUS RHYTHM  NORMAL ECG  WHEN COMPARED WITH ECG OF 04-JAN-2024 12:45, (UNCONFIRMED)  NO SIGNIFICANT CHANGE WAS FOUND  Confirmed by MD Minh, Mukesh ([id number]) on 1/5/2024 7:37:24 PM    < end of copied text >

## 2024-01-23 NOTE — BH CONSULTATION LIAISON PROGRESS NOTE - NSBHFUPINTERVALHXFT_PSY_A_CORE
Pt. seen and evaluated, chart, labs, meds reviewed. pt remains guarded, yelling times.  pt c/o his "kidneys hurt", needs a medical dr.  pt refusing to answer most questions. No SI/HI. Denies psychosis. No PRNs.

## 2024-01-23 NOTE — PROGRESS NOTE ADULT - SUBJECTIVE AND OBJECTIVE BOX
[facility] DIVISION OF KIDNEY DISEASES AND HYPERTENSION -- FOLLOW UP NOTE  --------------------------------------------------------------------------------  Chief Complaint:advanced CKD/Hyperkalemia    24 hour events/subjective:  noted        PAST HISTORY  --------------------------------------------------------------------------------  No significant changes to PMH, PSH, FHx, SHx, unless otherwise noted    ALLERGIES & MEDICATIONS  --------------------------------------------------------------------------------  Allergies    amlodipine (Other)  IV Contrast (Other)  Haldol (Other)    Intolerances      Standing Inpatient Medications  clopidogrel Tablet 75 milliGRAM(s) Oral daily  divalproex  milliGRAM(s) Oral two times a day  finasteride 5 milliGRAM(s) Oral daily  haloperidol     Tablet 2 milliGRAM(s) Oral at bedtime  haloperidol     Tablet 1 milliGRAM(s) Oral daily  hydrALAZINE 25 milliGRAM(s) Oral three times a day  insulin glargine Injectable (LANTUS) 4 Unit(s) SubCutaneous at bedtime  insulin lispro (ADMELOG) corrective regimen sliding scale   SubCutaneous three times a day before meals  isosorbide   mononitrate ER Tablet (IMDUR) 30 milliGRAM(s) Oral daily  melatonin 3 milliGRAM(s) Oral at bedtime  metoprolol tartrate 100 milliGRAM(s) Oral daily  sodium bicarbonate 1300 milliGRAM(s) Oral three times a day  tamsulosin 0.4 milliGRAM(s) Oral at bedtime  traZODone 100 milliGRAM(s) Oral at bedtime    PRN Inpatient Medications  dextrose Oral Gel 15 Gram(s) Oral once PRN  haloperidol    Injectable 2 milliGRAM(s) IV Push every 6 hours PRN      REVIEW OF SYSTEMS  --------------------------------------------------------------------------------       All other systems were reviewed and are negative, except as noted.    VITALS/PHYSICAL EXAM  --------------------------------------------------------------------------------  T(C): 36.2 (01-23-24 @ 12:06), Max: 36.3 (01-23-24 @ 04:26)  HR: 74 (01-23-24 @ 12:06) (67 - 85)  BP: 163/73 (01-23-24 @ 12:06) (152/73 - 164/63)  RR: 18 (01-23-24 @ 12:06) (18 - 18)  SpO2: 100% (01-23-24 @ 12:06) (100% - 100%)  Wt(kg): --        01-22-24 @ 07:01  -  01-23-24 @ 07:00  --------------------------------------------------------  IN: 720 mL / OUT: 0 mL / NET: 720 mL    01-23-24 @ 07:01  -  01-23-24 @ 16:26  --------------------------------------------------------  IN: 840 mL / OUT: 0 mL / NET: 840 mL      Physical Exam:  General: no acute distress  Neuro: awake  HEENT: NC/AT, anicteric  Pulmonary: lungs CTA B/L  Cardiovascular/Chest: +S1S2, RRR  GI/Abdomen: soft, NT/ND, +bowel sounds  Extremities: trace edema   Skin: Warm and dry      LABS/STUDIES  --------------------------------------------------------------------------------                Creatinine Trend:  SCr 5.91 [01-18 @ 07:47]  SCr 6.36 [01-17 @ 07:47]  SCr 5.95 [01-15 @ 07:08]  SCr 5.47 [01-14 @ 07:25]  SCr 5.27 [01-13 @ 07:00]    Urinalysis - [01-18-24 @ 07:47]      Color  / Appearance  / SG  / pH       Gluc 159 / Ketone   / Bili  / Urobili        Blood  / Protein  / Leuk Est  / Nitrite       RBC  / WBC  / Hyaline  / Gran  / Sq Epi  / Non Sq Epi  / Bacteria       Iron 73, TIBC --, %sat --      [01-18-24 @ 07:47]  Ferritin 239      [01-18-24 @ 07:13]  TSH 2.59      [12-19-23 @ 15:21]    HCV 12.81, Reactive      [01-12-24 @ 06:41]

## 2024-01-23 NOTE — PROGRESS NOTE ADULT - ASSESSMENT
77 yo male h/o schizophrenia, dementia, agitation, ckd, dm, htn, presenting to ER with agitation    schizophrenia/dementia/agitation  psy following  plan for inpt psy admission 2PC  psy meds as per psy  d/w psych attending    pt needs constant observation and will be 2pc to inpt psy facility  As per psych  and care coordination Patient will need a level 2 psych eval prior to DC     ckd  stable   creat at baseline  pt makes urine  no need for urgent HD  lokelma given for hyperKalemia today  to f/u with outpt nephrologist as noted in nephrology consult note from 12/27/23  renal f/u noted  no plan for dialysis at this time. pt will need weekly bmp. as long as creat remains stable and bmp wnl then no plans to start HD.    will need routine f/u with outpt nephrologist     hyperkalemia  s/p lokelma, calcium, lasix  potassium improved  cont monitor    HTN  resume outpt bp meds    anemia  chronic  due to ckd  iron supp  monitor     dm  lantus and iss   monitor fs  decreased lantus to 4units given hypoglycemic event     pt medically stable for dc to inpt psy facility   currently pt DOES NOT need dialysis   Awaitng further input from social work          Approx 35 minutes spent.  Seen in coverage for Dr Granger
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
## 2024-01-24 LAB
GLUCOSE BLDC GLUCOMTR-MCNC: 131 MG/DL — HIGH (ref 70–99)
GLUCOSE BLDC GLUCOMTR-MCNC: 133 MG/DL — HIGH (ref 70–99)
GLUCOSE BLDC GLUCOMTR-MCNC: 187 MG/DL — HIGH (ref 70–99)
GLUCOSE BLDC GLUCOMTR-MCNC: 207 MG/DL — HIGH (ref 70–99)

## 2024-01-24 RX ORDER — METOPROLOL TARTRATE 50 MG
150 TABLET ORAL DAILY
Refills: 0 | Status: DISCONTINUED | OUTPATIENT
Start: 2024-01-25 | End: 2024-01-31

## 2024-01-24 RX ORDER — DARBEPOETIN ALFA IN POLYSORBAT 200MCG/0.4
40 PEN INJECTOR (ML) SUBCUTANEOUS ONCE
Refills: 0 | Status: COMPLETED | OUTPATIENT
Start: 2024-01-24 | End: 2024-01-24

## 2024-01-24 RX ADMIN — Medication 100 MILLIGRAM(S): at 05:37

## 2024-01-24 RX ADMIN — Medication 25 MILLIGRAM(S): at 13:07

## 2024-01-24 RX ADMIN — Medication 2: at 08:57

## 2024-01-24 RX ADMIN — Medication 1300 MILLIGRAM(S): at 05:36

## 2024-01-24 RX ADMIN — ISOSORBIDE MONONITRATE 30 MILLIGRAM(S): 60 TABLET, EXTENDED RELEASE ORAL at 11:20

## 2024-01-24 RX ADMIN — CLOPIDOGREL BISULFATE 75 MILLIGRAM(S): 75 TABLET, FILM COATED ORAL at 11:23

## 2024-01-24 RX ADMIN — DIVALPROEX SODIUM 500 MILLIGRAM(S): 500 TABLET, DELAYED RELEASE ORAL at 17:42

## 2024-01-24 RX ADMIN — FINASTERIDE 5 MILLIGRAM(S): 5 TABLET, FILM COATED ORAL at 11:20

## 2024-01-24 RX ADMIN — Medication 1300 MILLIGRAM(S): at 22:04

## 2024-01-24 RX ADMIN — Medication 100 MILLIGRAM(S): at 22:04

## 2024-01-24 RX ADMIN — DIVALPROEX SODIUM 500 MILLIGRAM(S): 500 TABLET, DELAYED RELEASE ORAL at 05:37

## 2024-01-24 RX ADMIN — HALOPERIDOL DECANOATE 2 MILLIGRAM(S): 100 INJECTION INTRAMUSCULAR at 22:04

## 2024-01-24 RX ADMIN — INSULIN GLARGINE 4 UNIT(S): 100 INJECTION, SOLUTION SUBCUTANEOUS at 22:03

## 2024-01-24 RX ADMIN — Medication 40 MICROGRAM(S): at 17:42

## 2024-01-24 RX ADMIN — Medication 25 MILLIGRAM(S): at 22:04

## 2024-01-24 RX ADMIN — Medication 25 MILLIGRAM(S): at 05:36

## 2024-01-24 RX ADMIN — TAMSULOSIN HYDROCHLORIDE 0.4 MILLIGRAM(S): 0.4 CAPSULE ORAL at 22:04

## 2024-01-24 RX ADMIN — HALOPERIDOL DECANOATE 1 MILLIGRAM(S): 100 INJECTION INTRAMUSCULAR at 11:22

## 2024-01-24 RX ADMIN — Medication 1300 MILLIGRAM(S): at 13:07

## 2024-01-24 RX ADMIN — Medication 3 MILLIGRAM(S): at 22:04
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
## 2024-01-25 LAB
GLUCOSE BLDC GLUCOMTR-MCNC: 123 MG/DL — HIGH (ref 70–99)
GLUCOSE BLDC GLUCOMTR-MCNC: 137 MG/DL — HIGH (ref 70–99)
GLUCOSE BLDC GLUCOMTR-MCNC: 144 MG/DL — HIGH (ref 70–99)
GLUCOSE BLDC GLUCOMTR-MCNC: 92 MG/DL — SIGNIFICANT CHANGE UP (ref 70–99)

## 2024-01-25 RX ADMIN — Medication 25 MILLIGRAM(S): at 14:06

## 2024-01-25 RX ADMIN — TAMSULOSIN HYDROCHLORIDE 0.4 MILLIGRAM(S): 0.4 CAPSULE ORAL at 22:17

## 2024-01-25 RX ADMIN — HALOPERIDOL DECANOATE 1 MILLIGRAM(S): 100 INJECTION INTRAMUSCULAR at 13:58

## 2024-01-25 RX ADMIN — Medication 1300 MILLIGRAM(S): at 05:25

## 2024-01-25 RX ADMIN — Medication 1300 MILLIGRAM(S): at 14:05

## 2024-01-25 RX ADMIN — DIVALPROEX SODIUM 500 MILLIGRAM(S): 500 TABLET, DELAYED RELEASE ORAL at 18:03

## 2024-01-25 RX ADMIN — HALOPERIDOL DECANOATE 2 MILLIGRAM(S): 100 INJECTION INTRAMUSCULAR at 22:17

## 2024-01-25 RX ADMIN — Medication 1300 MILLIGRAM(S): at 22:17

## 2024-01-25 RX ADMIN — CLOPIDOGREL BISULFATE 75 MILLIGRAM(S): 75 TABLET, FILM COATED ORAL at 13:58

## 2024-01-25 RX ADMIN — ISOSORBIDE MONONITRATE 30 MILLIGRAM(S): 60 TABLET, EXTENDED RELEASE ORAL at 13:58

## 2024-01-25 RX ADMIN — DIVALPROEX SODIUM 500 MILLIGRAM(S): 500 TABLET, DELAYED RELEASE ORAL at 05:25

## 2024-01-25 RX ADMIN — Medication 25 MILLIGRAM(S): at 05:25

## 2024-01-25 RX ADMIN — FINASTERIDE 5 MILLIGRAM(S): 5 TABLET, FILM COATED ORAL at 13:57

## 2024-01-25 RX ADMIN — Medication 3 MILLIGRAM(S): at 22:17

## 2024-01-25 RX ADMIN — Medication 150 MILLIGRAM(S): at 05:25

## 2024-01-25 RX ADMIN — Medication 25 MILLIGRAM(S): at 22:17

## 2024-01-25 RX ADMIN — INSULIN GLARGINE 4 UNIT(S): 100 INJECTION, SOLUTION SUBCUTANEOUS at 22:16

## 2024-01-25 RX ADMIN — Medication 100 MILLIGRAM(S): at 22:17
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
## 2024-01-26 LAB
GLUCOSE BLDC GLUCOMTR-MCNC: 126 MG/DL — HIGH (ref 70–99)
GLUCOSE BLDC GLUCOMTR-MCNC: 140 MG/DL — HIGH (ref 70–99)
GLUCOSE BLDC GLUCOMTR-MCNC: 151 MG/DL — HIGH (ref 70–99)
GLUCOSE BLDC GLUCOMTR-MCNC: 206 MG/DL — HIGH (ref 70–99)

## 2024-01-26 RX ADMIN — Medication 3 MILLIGRAM(S): at 21:59

## 2024-01-26 RX ADMIN — HALOPERIDOL DECANOATE 2 MILLIGRAM(S): 100 INJECTION INTRAMUSCULAR at 22:00

## 2024-01-26 RX ADMIN — DIVALPROEX SODIUM 500 MILLIGRAM(S): 500 TABLET, DELAYED RELEASE ORAL at 05:04

## 2024-01-26 RX ADMIN — Medication 1300 MILLIGRAM(S): at 21:59

## 2024-01-26 RX ADMIN — Medication 2: at 08:28

## 2024-01-26 RX ADMIN — Medication 1300 MILLIGRAM(S): at 05:03

## 2024-01-26 RX ADMIN — DIVALPROEX SODIUM 500 MILLIGRAM(S): 500 TABLET, DELAYED RELEASE ORAL at 17:26

## 2024-01-26 RX ADMIN — CLOPIDOGREL BISULFATE 75 MILLIGRAM(S): 75 TABLET, FILM COATED ORAL at 11:21

## 2024-01-26 RX ADMIN — Medication 25 MILLIGRAM(S): at 13:12

## 2024-01-26 RX ADMIN — Medication 1300 MILLIGRAM(S): at 13:12

## 2024-01-26 RX ADMIN — Medication 150 MILLIGRAM(S): at 05:04

## 2024-01-26 RX ADMIN — INSULIN GLARGINE 4 UNIT(S): 100 INJECTION, SOLUTION SUBCUTANEOUS at 21:59

## 2024-01-26 RX ADMIN — Medication 100 MILLIGRAM(S): at 22:00

## 2024-01-26 RX ADMIN — Medication 25 MILLIGRAM(S): at 21:58

## 2024-01-26 RX ADMIN — FINASTERIDE 5 MILLIGRAM(S): 5 TABLET, FILM COATED ORAL at 11:21

## 2024-01-26 RX ADMIN — TAMSULOSIN HYDROCHLORIDE 0.4 MILLIGRAM(S): 0.4 CAPSULE ORAL at 21:58

## 2024-01-26 RX ADMIN — HALOPERIDOL DECANOATE 1 MILLIGRAM(S): 100 INJECTION INTRAMUSCULAR at 11:21

## 2024-01-26 RX ADMIN — ISOSORBIDE MONONITRATE 30 MILLIGRAM(S): 60 TABLET, EXTENDED RELEASE ORAL at 11:21

## 2024-01-26 RX ADMIN — Medication 25 MILLIGRAM(S): at 05:04

## 2024-01-26 NOTE — PROGRESS NOTE ADULT - ASSESSMENT
77 yo male h/o schizophrenia, dementia, agitation, ckd, dm, htn, presenting to ER with agitation    schizophrenia/dementia/agitation  psy following  plan for inpt psy admission 2PC  psy meds as per psy  d/w psych attending    now off constant observation  stable     ckd  stable   creat at baseline  pt makes urine  no need for urgent HD  lokelma given for hyperKalemia today  to f/u with outpt nephrologist as noted in nephrology consult note from 12/27/23  renal f/u noted  no plan for dialysis at this time. pt will need weekly bmp. as long as creat remains stable and bmp wnl then no plans to start HD.    will need routine f/u with outpt nephrologist     hyperkalemia  s/p lokelma, calcium, lasix  potassium improved  cont monitor    HTN  resume outpt bp meds    anemia  chronic  due to ckd  iron supp  monitor     dm  lantus and iss   monitor fs  decreased lantus to 4units given hypoglycemic event     pt medically stable for dc   currently pt DOES NOT need dialysis     Advanced care planning was discussed with patient and family.  Advanced care planning forms were reviewed and discussed as appropriate.  Differential diagnosis and plan of care discussed with patient after the evaluation.   Pain assessed and judicious use of narcotics when appropriate was discussed.  Importance of Fall prevention discussed.  Counseling on Smoking and Alcohol cessation was offered when appropriate.  Counseling on Diet, exercise, and medication compliance was done.       Approx 75 minutes spent.
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
## 2024-01-26 NOTE — CHART NOTE - NSCHARTNOTEFT_GEN_A_CORE
Nutrition Follow Up Note  Patient seen for: length of stay follow up     Chart reviewed, events noted.    Source: [] Patient       [x] medical record         [x] RN        [] Family at bedside       [] Other:    -If unable to interview patient: [] Trach/Vent/BiPAP  [x] Disoriented/confused/inappropriate to interview    Diet Order:   Diet, Regular:   Consistent Carbohydrate {No Snacks} (CSTCHO)  No Concentrated Potassium  Low Sodium  No Concentrated Phosphorus (01-18-24)    - Is current order appropriate/adequate? [x] Yes  []  No:     - PO intake :   [x] >75%  Adequate    [] 50-75%  Fair       [] <50%  Poor    - Nutrition-related concerns  -Per RN, patient with good PO intake. Patient complains of no soda and chips. RD added double portions of protein at meals.  -Finger sticks fluctuating, elevated today 206. Current insulin regimen in-house: lantus 4 units at bedtime, Correctional sliding scale insulin.  -Advanced CKD per Nephrology. No recent K+ or phosphorus since 1/18 to trend. No plan to start HD per Internal Medicine notes.    GI: No GI distress per RN. Last BM today 1/26 per flowsheets.   Bowel Regimen? [] Yes   [x] No      Weights:   Dosing weight: 163.1lb (1/9, bed).  No new weights to trend. RD unable to obtain bed weight at visit. RD to continue to monitor weight trends as available/able.     Nutritionally Pertinent MEDICATIONS  (STANDING):  clopidogrel Tablet 75 milliGRAM(s) Oral daily  divalproex  milliGRAM(s) Oral two times a day  finasteride 5 milliGRAM(s) Oral daily  haloperidol     Tablet 2 milliGRAM(s) Oral at bedtime  haloperidol     Tablet 1 milliGRAM(s) Oral daily  hydrALAZINE 25 milliGRAM(s) Oral three times a day  insulin glargine Injectable (LANTUS) 4 Unit(s) SubCutaneous at bedtime  insulin lispro (ADMELOG) corrective regimen sliding scale   SubCutaneous three times a day before meals  isosorbide   mononitrate ER Tablet (IMDUR) 30 milliGRAM(s) Oral daily  melatonin 3 milliGRAM(s) Oral at bedtime  metoprolol succinate  milliGRAM(s) Oral daily  sodium bicarbonate 1300 milliGRAM(s) Oral three times a day  tamsulosin 0.4 milliGRAM(s) Oral at bedtime  traZODone 100 milliGRAM(s) Oral at bedtime    MEDICATIONS  (PRN):  haloperidol    Injectable 2 milliGRAM(s) IV Push every 6 hours PRN agitation      Pertinent Labs:   A1C with Estimated Average Glucose Result: 5.1 % (01-05-24 @ 05:17)    Finger Sticks:  POCT Blood Glucose.: 206 mg/dL (01-26 @ 08:18)  POCT Blood Glucose.: 137 mg/dL (01-25 @ 21:45)  POCT Blood Glucose.: 144 mg/dL (01-25 @ 17:30)  POCT Blood Glucose.: 123 mg/dL (01-25 @ 12:21)      Skin per nursing documentation: no pressure injury noted   Edema: No noted edema as per flowsheets.     Estimated Needs:  based on dosing weight 163.1lb, with consideration for CKD  [x] no change since previous assessment  Energy: 6308-6262 kcal/day (25-30 kcal/kg)  Protein: 59-74 g/day (0.8-1.0 g/kg)  Fluids: defer to medical team     Previous Nutrition Diagnosis: Altered Nutrition Related Lab Values  Nutrition Diagnosis is: [x] ongoing  [] resolved [] not applicable  -Being addressed with PO diet recommendations - Consistent Carbohydrate Renal diet    New Nutrition Diagnosis: [] Not applicable    Nutrition Care Plan:  [x] In Progress  [] Achieved  [] Not applicable    Nutrition Interventions:     Education Provided:       [] Yes:  [x] No: Not warranted       Recommendations:      -Continue current diet order: Consistent Carbohydrate Renal.      Monitoring and Evaluation:   Continue to monitor nutritional intake, tolerance to diet prescription, weights, labs, skin integrity      RD remains available upon request and will follow up per protocol  Corinne Lima, Registered Dietitian, available via TEAMS

## 2024-01-27 LAB
GLUCOSE BLDC GLUCOMTR-MCNC: 112 MG/DL — HIGH (ref 70–99)
GLUCOSE BLDC GLUCOMTR-MCNC: 131 MG/DL — HIGH (ref 70–99)
GLUCOSE BLDC GLUCOMTR-MCNC: 181 MG/DL — HIGH (ref 70–99)
GLUCOSE BLDC GLUCOMTR-MCNC: 205 MG/DL — HIGH (ref 70–99)

## 2024-01-27 RX ADMIN — Medication 25 MILLIGRAM(S): at 05:42

## 2024-01-27 RX ADMIN — Medication 3 MILLIGRAM(S): at 21:00

## 2024-01-27 RX ADMIN — DIVALPROEX SODIUM 500 MILLIGRAM(S): 500 TABLET, DELAYED RELEASE ORAL at 05:42

## 2024-01-27 RX ADMIN — INSULIN GLARGINE 4 UNIT(S): 100 INJECTION, SOLUTION SUBCUTANEOUS at 22:47

## 2024-01-27 RX ADMIN — Medication 1300 MILLIGRAM(S): at 15:10

## 2024-01-27 RX ADMIN — Medication 1300 MILLIGRAM(S): at 20:57

## 2024-01-27 RX ADMIN — Medication 100 MILLIGRAM(S): at 20:59

## 2024-01-27 RX ADMIN — CLOPIDOGREL BISULFATE 75 MILLIGRAM(S): 75 TABLET, FILM COATED ORAL at 11:27

## 2024-01-27 RX ADMIN — TAMSULOSIN HYDROCHLORIDE 0.4 MILLIGRAM(S): 0.4 CAPSULE ORAL at 21:04

## 2024-01-27 RX ADMIN — DIVALPROEX SODIUM 500 MILLIGRAM(S): 500 TABLET, DELAYED RELEASE ORAL at 17:15

## 2024-01-27 RX ADMIN — Medication 150 MILLIGRAM(S): at 05:42

## 2024-01-27 RX ADMIN — HALOPERIDOL DECANOATE 2 MILLIGRAM(S): 100 INJECTION INTRAMUSCULAR at 20:59

## 2024-01-27 RX ADMIN — Medication 1: at 08:46

## 2024-01-27 RX ADMIN — Medication 25 MILLIGRAM(S): at 15:10

## 2024-01-27 RX ADMIN — FINASTERIDE 5 MILLIGRAM(S): 5 TABLET, FILM COATED ORAL at 11:27

## 2024-01-27 RX ADMIN — Medication 1300 MILLIGRAM(S): at 05:42

## 2024-01-27 RX ADMIN — HALOPERIDOL DECANOATE 1 MILLIGRAM(S): 100 INJECTION INTRAMUSCULAR at 11:26

## 2024-01-27 RX ADMIN — ISOSORBIDE MONONITRATE 30 MILLIGRAM(S): 60 TABLET, EXTENDED RELEASE ORAL at 11:26

## 2024-01-27 RX ADMIN — Medication 2: at 12:19

## 2024-01-27 RX ADMIN — Medication 25 MILLIGRAM(S): at 20:59
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
## 2024-01-27 NOTE — PROGRESS NOTE ADULT - ASSESSMENT
79 yo male h/o schizophrenia, dementia, agitation, ckd, dm, htn, presenting to ER with agitation    schizophrenia/dementia/agitation  psy following  plan for inpt psy admission 2PC  psy meds as per psy  d/w psych attending    now off constant observation  stable     ckd  stable   creat at baseline  pt makes urine  no need for urgent HD  lokelma given for hyperKalemia today  to f/u with outpt nephrologist as noted in nephrology consult note from 12/27/23  renal f/u noted  no plan for dialysis at this time. pt will need weekly bmp. as long as creat remains stable and bmp wnl then no plans to start HD.    will need routine f/u with outpt nephrologist     hyperkalemia  s/p lokelma, calcium, lasix  potassium improved  cont monitor    HTN  resume outpt bp meds    anemia  chronic  due to ckd  iron supp  monitor     dm  lantus and iss   monitor fs  decreased lantus to 4units given hypoglycemic event     pt medically stable for dc   currently pt DOES NOT need dialysis     Advanced care planning was discussed with patient and family.  Advanced care planning forms were reviewed and discussed as appropriate.  Differential diagnosis and plan of care discussed with patient after the evaluation.   Pain assessed and judicious use of narcotics when appropriate was discussed.  Importance of Fall prevention discussed.  Counseling on Smoking and Alcohol cessation was offered when appropriate.  Counseling on Diet, exercise, and medication compliance was done.       Approx 75 minutes spent.

## 2024-01-28 LAB
GLUCOSE BLDC GLUCOMTR-MCNC: 132 MG/DL — HIGH (ref 70–99)
GLUCOSE BLDC GLUCOMTR-MCNC: 146 MG/DL — HIGH (ref 70–99)
GLUCOSE BLDC GLUCOMTR-MCNC: 201 MG/DL — HIGH (ref 70–99)
GLUCOSE BLDC GLUCOMTR-MCNC: 97 MG/DL — SIGNIFICANT CHANGE UP (ref 70–99)

## 2024-01-28 RX ADMIN — Medication 2: at 12:52

## 2024-01-28 RX ADMIN — Medication 100 MILLIGRAM(S): at 21:44

## 2024-01-28 RX ADMIN — Medication 25 MILLIGRAM(S): at 12:51

## 2024-01-28 RX ADMIN — Medication 3 MILLIGRAM(S): at 21:45

## 2024-01-28 RX ADMIN — FINASTERIDE 5 MILLIGRAM(S): 5 TABLET, FILM COATED ORAL at 12:51

## 2024-01-28 RX ADMIN — Medication 25 MILLIGRAM(S): at 06:06

## 2024-01-28 RX ADMIN — Medication 1300 MILLIGRAM(S): at 21:45

## 2024-01-28 RX ADMIN — Medication 150 MILLIGRAM(S): at 06:06

## 2024-01-28 RX ADMIN — Medication 1300 MILLIGRAM(S): at 12:51

## 2024-01-28 RX ADMIN — Medication 1300 MILLIGRAM(S): at 06:06

## 2024-01-28 RX ADMIN — TAMSULOSIN HYDROCHLORIDE 0.4 MILLIGRAM(S): 0.4 CAPSULE ORAL at 21:46

## 2024-01-28 RX ADMIN — Medication 25 MILLIGRAM(S): at 21:46

## 2024-01-28 RX ADMIN — DIVALPROEX SODIUM 500 MILLIGRAM(S): 500 TABLET, DELAYED RELEASE ORAL at 17:33

## 2024-01-28 RX ADMIN — DIVALPROEX SODIUM 500 MILLIGRAM(S): 500 TABLET, DELAYED RELEASE ORAL at 06:05

## 2024-01-28 RX ADMIN — HALOPERIDOL DECANOATE 2 MILLIGRAM(S): 100 INJECTION INTRAMUSCULAR at 21:45

## 2024-01-28 RX ADMIN — HALOPERIDOL DECANOATE 1 MILLIGRAM(S): 100 INJECTION INTRAMUSCULAR at 12:51

## 2024-01-28 RX ADMIN — INSULIN GLARGINE 4 UNIT(S): 100 INJECTION, SOLUTION SUBCUTANEOUS at 21:46

## 2024-01-28 RX ADMIN — CLOPIDOGREL BISULFATE 75 MILLIGRAM(S): 75 TABLET, FILM COATED ORAL at 12:51

## 2024-01-28 RX ADMIN — ISOSORBIDE MONONITRATE 30 MILLIGRAM(S): 60 TABLET, EXTENDED RELEASE ORAL at 12:51
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
## 2024-01-29 LAB
ANION GAP SERPL CALC-SCNC: 15 MMOL/L — SIGNIFICANT CHANGE UP (ref 5–17)
BUN SERPL-MCNC: 88 MG/DL — HIGH (ref 7–23)
CALCIUM SERPL-MCNC: 9 MG/DL — SIGNIFICANT CHANGE UP (ref 8.4–10.5)
CHLORIDE SERPL-SCNC: 106 MMOL/L — SIGNIFICANT CHANGE UP (ref 96–108)
CO2 SERPL-SCNC: 21 MMOL/L — LOW (ref 22–31)
CREAT SERPL-MCNC: 4.91 MG/DL — HIGH (ref 0.5–1.3)
EGFR: 11 ML/MIN/1.73M2 — LOW
GLUCOSE BLDC GLUCOMTR-MCNC: 107 MG/DL — HIGH (ref 70–99)
GLUCOSE BLDC GLUCOMTR-MCNC: 184 MG/DL — HIGH (ref 70–99)
GLUCOSE BLDC GLUCOMTR-MCNC: 187 MG/DL — HIGH (ref 70–99)
GLUCOSE BLDC GLUCOMTR-MCNC: 190 MG/DL — HIGH (ref 70–99)
GLUCOSE SERPL-MCNC: 79 MG/DL — SIGNIFICANT CHANGE UP (ref 70–99)
POTASSIUM SERPL-MCNC: 4.7 MMOL/L — SIGNIFICANT CHANGE UP (ref 3.5–5.3)
POTASSIUM SERPL-SCNC: 4.7 MMOL/L — SIGNIFICANT CHANGE UP (ref 3.5–5.3)
SARS-COV-2 RNA SPEC QL NAA+PROBE: SIGNIFICANT CHANGE UP
SODIUM SERPL-SCNC: 142 MMOL/L — SIGNIFICANT CHANGE UP (ref 135–145)

## 2024-01-29 PROCEDURE — 99232 SBSQ HOSP IP/OBS MODERATE 35: CPT

## 2024-01-29 RX ADMIN — Medication 25 MILLIGRAM(S): at 05:52

## 2024-01-29 RX ADMIN — DIVALPROEX SODIUM 500 MILLIGRAM(S): 500 TABLET, DELAYED RELEASE ORAL at 05:52

## 2024-01-29 RX ADMIN — Medication 1: at 17:34

## 2024-01-29 RX ADMIN — Medication 3 MILLIGRAM(S): at 21:51

## 2024-01-29 RX ADMIN — CLOPIDOGREL BISULFATE 75 MILLIGRAM(S): 75 TABLET, FILM COATED ORAL at 12:53

## 2024-01-29 RX ADMIN — Medication 100 MILLIGRAM(S): at 21:50

## 2024-01-29 RX ADMIN — Medication 25 MILLIGRAM(S): at 21:50

## 2024-01-29 RX ADMIN — Medication 1: at 08:28

## 2024-01-29 RX ADMIN — ISOSORBIDE MONONITRATE 30 MILLIGRAM(S): 60 TABLET, EXTENDED RELEASE ORAL at 12:53

## 2024-01-29 RX ADMIN — HALOPERIDOL DECANOATE 1 MILLIGRAM(S): 100 INJECTION INTRAMUSCULAR at 12:53

## 2024-01-29 RX ADMIN — Medication 1300 MILLIGRAM(S): at 05:52

## 2024-01-29 RX ADMIN — Medication 1300 MILLIGRAM(S): at 12:58

## 2024-01-29 RX ADMIN — DIVALPROEX SODIUM 500 MILLIGRAM(S): 500 TABLET, DELAYED RELEASE ORAL at 17:35

## 2024-01-29 RX ADMIN — HALOPERIDOL DECANOATE 2 MILLIGRAM(S): 100 INJECTION INTRAMUSCULAR at 21:50

## 2024-01-29 RX ADMIN — FINASTERIDE 5 MILLIGRAM(S): 5 TABLET, FILM COATED ORAL at 12:53

## 2024-01-29 RX ADMIN — TAMSULOSIN HYDROCHLORIDE 0.4 MILLIGRAM(S): 0.4 CAPSULE ORAL at 21:50

## 2024-01-29 RX ADMIN — INSULIN GLARGINE 4 UNIT(S): 100 INJECTION, SOLUTION SUBCUTANEOUS at 22:39

## 2024-01-29 RX ADMIN — Medication 25 MILLIGRAM(S): at 12:58

## 2024-01-29 RX ADMIN — Medication 1300 MILLIGRAM(S): at 21:50

## 2024-01-29 RX ADMIN — Medication 150 MILLIGRAM(S): at 05:52
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
## 2024-01-29 NOTE — BH CONSULTATION LIAISON PROGRESS NOTE - NSBHCHARTREVIEWLAB_PSY_A_CORE FT
01-29    142  |  106  |  88<H>  ----------------------------<  79  4.7   |  21<L>  |  4.91<H>    Ca    9.0      29 Jan 2024 07:09      Urinalysis Basic - ( 29 Jan 2024 07:09 )    Color: x / Appearance: x / SG: x / pH: x  Gluc: 79 mg/dL / Ketone: x  / Bili: x / Urobili: x   Blood: x / Protein: x / Nitrite: x   Leuk Esterase: x / RBC: x / WBC x   Sq Epi: x / Non Sq Epi: x / Bacteria: x    
                                     01-17    137  |  101  |  93<H>  ----------------------------<  140<H>  5.0   |  22  |  6.36<H>    Ca    8.3<L>      17 Jan 2024 07:47      Urinalysis Basic - ( 17 Jan 2024 07:47 )    Color: x / Appearance: x / SG: x / pH: x  Gluc: 140 mg/dL / Ketone: x  / Bili: x / Urobili: x   Blood: x / Protein: x / Nitrite: x   Leuk Esterase: x / RBC: x / WBC x   Sq Epi: x / Non Sq Epi: x / Bacteria: x    
                                 7.7    3.16  )-----------( 100      ( 14 Jan 2024 07:23 )             24.2     01-14    136  |  101  |  84<H>  ----------------------------<  117<H>  4.0   |  22  |  5.47<H>    Ca    7.9<L>      14 Jan 2024 07:25  Phos  4.1     01-14  Mg     1.6     01-14    
                              7.6    3.15  )-----------( 109      ( 12 Jan 2024 06:41 )             24.0     01-12    134<L>  |  103  |  95<H>  ----------------------------<  85  5.7<H>   |  19<L>  |  5.34<H>    Ca    8.5      12 Jan 2024 06:38  Phos  3.7     01-12  Mg     1.7     01-12      Urinalysis Basic - ( 12 Jan 2024 06:38 )    Color: x / Appearance: x / SG: x / pH: x  Gluc: 85 mg/dL / Ketone: x  / Bili: x / Urobili: x   Blood: x / Protein: x / Nitrite: x   Leuk Esterase: x / RBC: x / WBC x   Sq Epi: x / Non Sq Epi: x / Bacteria: x    
Urinalysis Basic - ( 2024 04:30 )    Color: Yellow / Appearance: Clear / S.014 / pH: x  Gluc: x / Ketone: Negative mg/dL  / Bili: Negative / Urobili: 0.2 mg/dL   Blood: x / Protein: 100 mg/dL / Nitrite: Negative   Leuk Esterase: Negative / RBC: 0 /HPF / WBC 0 /HPF   Sq Epi: x / Non Sq Epi: 0 /HPF / Bacteria: Negative /HPF
                       7.6    3.97  )-----------( 88       ( 18 Jan 2024 07:13 )             23.5     01-18    137  |  102  |  97<H>  ----------------------------<  159<H>  4.9   |  23  |  5.91<H>    Ca    8.4      18 Jan 2024 07:47      Urinalysis Basic - ( 18 Jan 2024 07:47 )    Color: x / Appearance: x / SG: x / pH: x  Gluc: 159 mg/dL / Ketone: x  / Bili: x / Urobili: x   Blood: x / Protein: x / Nitrite: x   Leuk Esterase: x / RBC: x / WBC x   Sq Epi: x / Non Sq Epi: x / Bacteria: x    
                                                 7.5    3.41  )-----------( 93       ( 15 Mikel 2024 07:08 )             22.9     01-15    139  |  103  |  92<H>  ----------------------------<  94  4.5   |  22  |  5.95<H>    Ca    8.2<L>      15 Mikel 2024 07:08  Phos  3.6     01-15  Mg     1.5     01-15    
                          7.6    3.97  )-----------( 88       ( 18 Jan 2024 07:13 )             23.5     01-18    137  |  102  |  97<H>  ----------------------------<  159<H>  4.9   |  23  |  5.91<H>    Ca    8.4      18 Jan 2024 07:47      Urinalysis Basic - ( 18 Jan 2024 07:47 )    Color: x / Appearance: x / SG: x / pH: x  Gluc: 159 mg/dL / Ketone: x  / Bili: x / Urobili: x   Blood: x / Protein: x / Nitrite: x   Leuk Esterase: x / RBC: x / WBC x   Sq Epi: x / Non Sq Epi: x / Bacteria: x    
                              7.6    3.15  )-----------( 109      ( 12 Jan 2024 06:41 )             24.0     01-12    134<L>  |  103  |  95<H>  ----------------------------<  85  5.7<H>   |  19<L>  |  5.34<H>    Ca    8.5      12 Jan 2024 06:38  Phos  3.7     01-12  Mg     1.7     01-12      Urinalysis Basic - ( 12 Jan 2024 06:38 )    Color: x / Appearance: x / SG: x / pH: x  Gluc: 85 mg/dL / Ketone: x  / Bili: x / Urobili: x   Blood: x / Protein: x / Nitrite: x   Leuk Esterase: x / RBC: x / WBC x   Sq Epi: x / Non Sq Epi: x / Bacteria: x    
                      7.9    4.14  )-----------( 116      ( 11 Jan 2024 07:07 )             24.3     01-11    139  |  107  |  96<H>  ----------------------------<  97  5.5<H>   |  19<L>  |  5.41<H>    Ca    8.4      11 Jan 2024 07:03      Urinalysis Basic - ( 11 Jan 2024 07:03 )    Color: x / Appearance: x / SG: x / pH: x  Gluc: 97 mg/dL / Ketone: x  / Bili: x / Urobili: x   Blood: x / Protein: x / Nitrite: x   Leuk Esterase: x / RBC: x / WBC x   Sq Epi: x / Non Sq Epi: x / Bacteria: x
                      7.7    6.56  )-----------( 110      ( 08 Jan 2024 16:11 )             23.2     01-08    133<L>  |  105  |  100<H>  ----------------------------<  101<H>  5.0   |  16<L>  |  5.55<H>    Ca    8.2<L>      08 Jan 2024 16:11  Phos  4.6     01-08  Mg     1.7     01-08      Urinalysis Basic - ( 08 Jan 2024 16:11 )    Color: x / Appearance: x / SG: x / pH: x  Gluc: 101 mg/dL / Ketone: x  / Bili: x / Urobili: x   Blood: x / Protein: x / Nitrite: x   Leuk Esterase: x / RBC: x / WBC x   Sq Epi: x / Non Sq Epi: x / Bacteria: x    
                                 7.7    3.16  )-----------( 100      ( 14 Jan 2024 07:23 )             24.2     01-14    136  |  101  |  84<H>  ----------------------------<  117<H>  4.0   |  22  |  5.47<H>    Ca    7.9<L>      14 Jan 2024 07:25  Phos  4.1     01-14  Mg     1.6     01-14
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
## 2024-01-29 NOTE — BH CONSULTATION LIAISON PROGRESS NOTE - NSBHCHARTREVIEWINVESTIGATE_PSY_A_CORE FT
< from: 12 Lead ECG (01.04.24 @ 23:51) >      Ventricular Rate 69 BPM    Atrial Rate 69 BPM    P-R Interval 160 ms    QRS Duration 76 ms    Q-T Interval 398 ms    QTC Calculation(Bazett) 426 ms    P Axis 63 degrees    R Axis 72 degrees    T Axis 52 degrees    Diagnosis Line BASELINE ARTIFACT  NORMAL SINUS RHYTHM  NORMAL ECG  WHEN COMPARED WITH ECG OF 04-JAN-2024 12:45, (UNCONFIRMED)  NO SIGNIFICANT CHANGE WAS FOUND  Confirmed by MD Minh, Mukesh ([id number]) on 1/5/2024 7:37:24 PM    < end of copied text >

## 2024-01-29 NOTE — BH CONSULTATION LIAISON PROGRESS NOTE - NSBHFUPINTERVALHXFT_PSY_A_CORE
Pt. seen and evaluated, chart, labs, meds reviewed, interim notes appreciated. Patient is less guarded and less preoccupied with his physical symptoms. No apparent pain. No SI/HI. Denies psychosis. No PRNs.     as per staff, patient does not represent a management problem.

## 2024-01-30 LAB
GLUCOSE BLDC GLUCOMTR-MCNC: 194 MG/DL — HIGH (ref 70–99)
GLUCOSE BLDC GLUCOMTR-MCNC: 197 MG/DL — HIGH (ref 70–99)
GLUCOSE BLDC GLUCOMTR-MCNC: 208 MG/DL — HIGH (ref 70–99)
GLUCOSE BLDC GLUCOMTR-MCNC: 98 MG/DL — SIGNIFICANT CHANGE UP (ref 70–99)

## 2024-01-30 RX ADMIN — Medication 25 MILLIGRAM(S): at 15:44

## 2024-01-30 RX ADMIN — Medication 3 MILLIGRAM(S): at 21:56

## 2024-01-30 RX ADMIN — DIVALPROEX SODIUM 500 MILLIGRAM(S): 500 TABLET, DELAYED RELEASE ORAL at 06:12

## 2024-01-30 RX ADMIN — Medication 2: at 12:54

## 2024-01-30 RX ADMIN — HALOPERIDOL DECANOATE 2 MILLIGRAM(S): 100 INJECTION INTRAMUSCULAR at 21:56

## 2024-01-30 RX ADMIN — Medication 25 MILLIGRAM(S): at 06:12

## 2024-01-30 RX ADMIN — HALOPERIDOL DECANOATE 1 MILLIGRAM(S): 100 INJECTION INTRAMUSCULAR at 13:01

## 2024-01-30 RX ADMIN — CLOPIDOGREL BISULFATE 75 MILLIGRAM(S): 75 TABLET, FILM COATED ORAL at 12:56

## 2024-01-30 RX ADMIN — FINASTERIDE 5 MILLIGRAM(S): 5 TABLET, FILM COATED ORAL at 12:55

## 2024-01-30 RX ADMIN — Medication 1300 MILLIGRAM(S): at 15:45

## 2024-01-30 RX ADMIN — Medication 150 MILLIGRAM(S): at 06:13

## 2024-01-30 RX ADMIN — INSULIN GLARGINE 4 UNIT(S): 100 INJECTION, SOLUTION SUBCUTANEOUS at 21:58

## 2024-01-30 RX ADMIN — Medication 1: at 08:50

## 2024-01-30 RX ADMIN — Medication 25 MILLIGRAM(S): at 21:56

## 2024-01-30 RX ADMIN — Medication 1300 MILLIGRAM(S): at 06:12

## 2024-01-30 RX ADMIN — DIVALPROEX SODIUM 500 MILLIGRAM(S): 500 TABLET, DELAYED RELEASE ORAL at 17:59

## 2024-01-30 RX ADMIN — ISOSORBIDE MONONITRATE 30 MILLIGRAM(S): 60 TABLET, EXTENDED RELEASE ORAL at 12:56

## 2024-01-30 RX ADMIN — TAMSULOSIN HYDROCHLORIDE 0.4 MILLIGRAM(S): 0.4 CAPSULE ORAL at 22:04

## 2024-01-30 RX ADMIN — Medication 100 MILLIGRAM(S): at 22:04

## 2024-01-30 RX ADMIN — Medication 1: at 17:59

## 2024-01-30 RX ADMIN — Medication 1300 MILLIGRAM(S): at 21:56

## 2024-01-30 NOTE — PROVIDER CONTACT NOTE (OTHER) - SITUATION
Pt refusing morning labs and uncooperative for temperature
AT 2023 /72,asymptomatic ,NO IVL noted,pt refused to insert  one,pt received hydralazine 25mg po
no IVL noted ,pt refused and keeping pulling out IVL
Patient with a blood sugar of 54- asymptomatic, nurse and pca following protocol to recheck every 15 mins. able to check once blood sugar 76 and patient refusing for another blood glucose check

## 2024-01-30 NOTE — DISCHARGE NOTE PROVIDER - CARE PROVIDER_API CALL
[facility] home  Phone: (   )    -  Fax: (   )    -  Follow Up Time:    [facility]  Phone: (   )    -  Fax: (   )    -  Follow Up Time:     Venecia Virk  Nephrology  [facility] [address]  [place] [address]  Phone: [phone]  Fax: [phone]  Follow Up Time: 1 week
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
## 2024-01-30 NOTE — DISCHARGE NOTE PROVIDER - NSDCFUADDAPPT_GEN_ALL_CORE_FT
You must have a BMP drawn weekly to monitor your potassium and creatinine levels - your first BMP should be drawn on 2/2/2024.    You must follow up with your primary medical doctor within 1-2 days of discharge - please call to make an appointment.    You must follow up with your nephrologist, Dr. Virk, within one week of discharge - please call to make an appointment.    You must follow up with your psychiatrist within one week of discharge.  You can follow up at the [facility] [phone], or [facility], or a provider at your current residence.          APPTS ARE READY TO BE MADE: [ X] YES    Best Family or Patient Contact (if needed):    Additional Information about above appointments (if needed):    1: Primary medical doctor  2: Nephrologist  3: Psychiatrist    Other comments or requests:    You must have a BMP drawn weekly to monitor your potassium and creatinine levels - your first BMP should be drawn on 2/2/2024.    You must follow up with your primary medical doctor within 1-2 days of discharge - please call to make an appointment.    You must follow up with your nephrologist within one week of discharge - please call to make an appointment.    You must follow up with your psychiatrist within one week of discharge.  You can follow up at the [facility] [phone], or at the [facility], or a provider at your current residence.          APPTS ARE READY TO BE MADE: [ X] YES    Best Family or Patient Contact (if needed):    Additional Information about above appointments (if needed):    1: Primary medical doctor  2: Nephrologist  3: Psychiatrist    Other comments or requests:    You must have a BMP drawn weekly to monitor your potassium and creatinine levels - your first BMP should be drawn on 2/2/2024.    You must follow up with your primary medical doctor within 1-2 days of discharge - please call to make an appointment.    You must follow up with your nephrologist within one week of discharge - please call to make an appointment.  [facility].    You must follow up with your psychiatrist within one week of discharge.  You can follow up at the [facility] [phone], or at the [facility], or a provider at your current residence.          APPTS ARE READY TO BE MADE: [ X] YES    Best Family or Patient Contact (if needed):    Additional Information about above appointments (if needed):    1: Primary medical doctor  2: Nephrologist  3: Psychiatrist    Other comments or requests:    You must have a BMP drawn weekly to monitor your potassium and creatinine levels - your first BMP should be drawn on 2/2/2024.    You must follow up with your primary medical doctor within 1-2 days of discharge - please call to make an appointment.    You must follow up with your nephrologist within one week of discharge - please call to make an appointment.  You [facility].    You must follow up with your psychiatrist within one week of discharge.  You can follow up at the [facility] [phone], or at the [facility], or a provider at your current residence.          APPTS ARE READY TO BE MADE: [ X] YES    Best Family or Patient Contact (if needed):    Additional Information about above appointments (if needed):    1: Primary medical doctor  2: Nephrologist  3: Psychiatrist    Other comments or requests:     Patient is being discharged to LTC. Caregiver will arrange follow up.

## 2024-01-30 NOTE — DISCHARGE NOTE PROVIDER - PROVIDER TOKENS
FREE:[LAST:[Tre],FIRST:[Tena],PHONE:[(   )    -],FAX:[(   )    -],ADDRESS:[[facility]]] FREE:[LAST:[Paikh],FIRST:[Tena],PHONE:[(   )    -],FAX:[(   )    -],ADDRESS:[[facility]]],PROVIDER:[TOKEN:[[id number]],FOLLOWUP:[1 week]]

## 2024-01-30 NOTE — DISCHARGE NOTE PROVIDER - NSDCCPCAREPLAN_GEN_ALL_CORE_FT
PRINCIPAL DISCHARGE DIAGNOSIS  Diagnosis: Schizophrenia  Assessment and Plan of Treatment: continue medication treatment  discharge to NH  SECONDARY DISCHARGE DIAGNOSES  Diagnosis: Schizophrenia, unspecified type  Assessment and Plan of Treatment: Continue outpatient follow up with psychiatrist Dr. Toledo at [facility].  To get better and follow your care plan as instructed.        SECONDARY DISCHARGE DIAGNOSES  Diagnosis: Stage 5 chronic kidney disease not on chronic dialysis  Assessment and Plan of Treatment: avoid nephrotoxic agents  follow up with nephrology after discharge  Diagnosis: Kidney failure  Assessment and Plan of Treatment: ESRD. Avoid taking NSAIDs (ex: Ibuprofen, Advil, Celebrex, Naprosyn) and other agents that can harm the kidneys such as intravenous contrast for diagnostic testing, combination cold medications, etc. until you are instructed to do so by your Primary Care Physician.  Have all of your medications adjusted for your renal function by your Health Care Provider.  Blood pressure control is important.  Take all medication as prescribed.  Do not overconsume foods that are high in potassium, such as bananas, until you are instructed to do so by your primary care physician.       PRINCIPAL DISCHARGE DIAGNOSIS  Diagnosis: Schizophrenia  Assessment and Plan of Treatment: continue medication treatment  discharge to NH  SECONDARY DISCHARGE DIAGNOSES  Diagnosis: Schizophrenia, unspecified type  Assessment and Plan of Treatment: Continue outpatient follow up with psychiatrist Dr. Toledo at [facility].  To get better and follow your care plan as instructed.        SECONDARY DISCHARGE DIAGNOSES  Diagnosis: Stage 5 chronic kidney disease not on chronic dialysis  Assessment and Plan of Treatment: You must have a BMP drawn weekly to monitor your creatinine and potassium levels - first one should be drawn on 2/2/2024.  You must follow up with your nephrologist within one week of discharge.  avoid nephrotoxic agents  Diagnosis: Kidney failure  Assessment and Plan of Treatment: ESRD. Avoid taking NSAIDs (ex: Ibuprofen, Advil, Celebrex, Naprosyn) and other agents that can harm the kidneys such as intravenous contrast for diagnostic testing, combination cold medications, etc. until you are instructed to do so by your Primary Care Physician.  Have all of your medications adjusted for your renal function by your Health Care Provider.  Blood pressure control is important.  Take all medication as prescribed.  Do not overconsume foods that are high in potassium, such as bananas, until you are instructed to do so by your primary care physician.      Diagnosis: Hyperkalemia  Assessment and Plan of Treatment: You must have a BMP drawn weekly to monitor your creatinine and potassium levels.  No concentrated potassium in your diet.    Diagnosis: DM (diabetes mellitus)  Assessment and Plan of Treatment: You must check your blood glucose before meals and at bedtime.  You diabetes medications were changed during admission - monitor your blood glucose closely to make sure that no changes need to be made to your regimen.  Make sure you get your HgA1c checked every three months.  It's important not to skip any meals.  Keep a log of your blood glucose results and always take it with you to your doctor appointments.  Keep a list of your current medications including injectables and over the counter medications and bring this medication list with you to all your doctor appointments.  If you have not seen your ophthalmologist this year call for appointment.  Check your feet daily for redness, sores, or openings. Do not self treat. If no improvement in two days call your primary care physician for an appointment.  Low blood sugar (hypoglycemia) is a blood sugar below 70mg/dl. Check your blood sugar if you feel signs/symptoms of hypoglycemia. If your blood sugar is below 70 take 15 grams of carbohydrates (ex 4 oz of apple juice, 3-4 glucose tablets, or 4-6 oz of regular soda) wait 15 minutes and repeat blood sugar to make sure it comes up above 70.  If your blood sugar is above 70 and you are due for a meal, have a meal.  If you are not due for a meal have a snack.  This snack helps keeps your blood sugar at a safe range.      Diagnosis: Anemia  Assessment and Plan of Treatment: Continue with iron  Monitor your CBC    Diagnosis: Hypertension  Assessment and Plan of Treatment: Your blood pressure medications were changed during this admission.  You must monitor your blood pressure closely.  Low salt diet  Activity as tolerated.  Take all medication as prescribed.  Follow up with your medical doctor for routine blood pressure monitoring at your next visit.  Notify your doctor if you have any of the following symptoms:   Dizziness, Lightheadedness, Blurry vision, Headache, Chest pain, Shortness of breath
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
## 2024-01-30 NOTE — PROVIDER CONTACT NOTE (OTHER) - RECOMMENDATIONS
continue to monitor and provider notification
continue to monitor and provider notification
Provider notified
Provider made aware.

## 2024-01-30 NOTE — PROVIDER CONTACT NOTE (OTHER) - REASON
no IVL noted ,/72
patient refusing to have another blood sugar check
pt received with out IVL
Pt refusing morning labs and uncooperative for temperature

## 2024-01-30 NOTE — DISCHARGE NOTE PROVIDER - CARE PROVIDERS DIRECT ADDRESSES
,DirectAddress_Unknown ,DirectAddress_Unknown,[email or web address]

## 2024-01-30 NOTE — DISCHARGE NOTE PROVIDER - DISCHARGE DIET
Regular Diet - No restrictions/DASH Diet/Low Sodium Diet/Consistent Carbohydrate Diabetic Diets/Renal Diets (for dialysis) Regular Diet - No restrictions/DASH Diet/Low Sodium Diet/Consistent Carbohydrate Diabetic Diets/Other Diet Instructions

## 2024-01-30 NOTE — DISCHARGE NOTE PROVIDER - INSTRUCTIONS
Regular diet, consistent carbohydrate, no concentrated potassium, low sodium, and no concentrated phosphorus

## 2024-01-30 NOTE — PROVIDER CONTACT NOTE (OTHER) - ASSESSMENT
Pt A&Ox3, pt refusing morning labs and uncooperative for temperature.
pt Aox3 ,asymptomatic
pt Alertxconfused, asymptomatic
A&ox3/4, agitated and aggressive. refusing another blood sugar check. vss, afebrile. denies sob, chest pain and blurry vision
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
## 2024-01-30 NOTE — DISCHARGE NOTE PROVIDER - NSDCMRMEDTOKEN_GEN_ALL_CORE_FT
Ascorbic Acid Inj Solution: 5 Gram Intravenously every day for 7 days for 4 weeks  clopidogrel 75 mg oral tablet: 1 tab(s) orally once a day  Depakote 500 mg oral delayed release tablet: 1 tab(s) orally 2 times a day  ferrous sulfate 325 mg (65 mg elemental iron) oral tablet: 1 tab(s) orally once a day (in the evening)  finasteride 5 mg oral tablet: 1 tab(s) orally once a day  glipiZIDE 5 mg oral tablet: 1 tab(s) orally once a day  haloperidol 2 mg oral tablet: 1 tab(s) orally once a day (at bedtime)  hydrALAZINE 25 mg oral tablet: 1 tab(s) orally once a day  isosorbide mononitrate 30 mg oral tablet, extended release: 1 tab(s) orally once a day  Lantus 100 units/mL subcutaneous solution: 10 unit(s) subcutaneous once a day (at bedtime)  metoprolol tartrate 100 mg oral tablet: 1 tab(s) orally once a day  senna (sennosides) 8.6 mg oral tablet: 1 tab(s) orally once a day (at bedtime)  sodium bicarbonate 650 mg oral tablet: 1 tab(s) orally once a day  tamsulosin 0.4 mg oral capsule: 1 cap(s) orally once a day (at bedtime)  traZODone 100 mg oral tablet: 1 tab(s) orally once a day (at bedtime)   Ascorbic Acid Inj Solution: 5 Gram Intravenously every day for 7 days for 4 weeks  clopidogrel 75 mg oral tablet: 1 tab(s) orally once a day  Depakote 500 mg oral delayed release tablet: 1 tab(s) orally 2 times a day  ferrous sulfate 325 mg (65 mg elemental iron) oral tablet: 1 tab(s) orally once a day (in the evening)  finasteride 5 mg oral tablet: 1 tab(s) orally once a day  glipiZIDE 5 mg oral tablet: 1 tab(s) orally once a day  haloperidol 1 mg oral tablet: 1 tab(s) orally once a day (in the morning)  haloperidol 2 mg oral tablet: 1 tab(s) orally once a day (at bedtime)  hydrALAZINE 25 mg oral tablet: 1 tab(s) orally once a day  isosorbide mononitrate 30 mg oral tablet, extended release: 1 tab(s) orally once a day  Lantus 100 units/mL subcutaneous solution: 10 unit(s) subcutaneous once a day (at bedtime)  melatonin 3 mg oral tablet: 1 tab(s) orally once a day (at bedtime)  metoprolol tartrate 100 mg oral tablet: 1 tab(s) orally once a day  senna (sennosides) 8.6 mg oral tablet: 1 tab(s) orally once a day (at bedtime)  sodium bicarbonate 650 mg oral tablet: 1 tab(s) orally once a day  tamsulosin 0.4 mg oral capsule: 1 cap(s) orally once a day (at bedtime)  traZODone 100 mg oral tablet: 1 tab(s) orally once a day (at bedtime)   clopidogrel 75 mg oral tablet: 1 tab(s) orally once a day  Depakote 500 mg oral delayed release tablet: 1 tab(s) orally 2 times a day  ferrous sulfate 325 mg (65 mg elemental iron) oral tablet: 1 tab(s) orally once a day (in the evening)  finasteride 5 mg oral tablet: 1 tab(s) orally once a day  haloperidol 1 mg oral tablet: 1 tab(s) orally once a day (in the morning)  haloperidol 2 mg oral tablet: 1 tab(s) orally once a day (at bedtime)  hydrALAZINE 25 mg oral tablet: 1 tab(s) orally 3 times a day  insulin glargine 100 units/mL subcutaneous solution: 4 unit(s) subcutaneous once a day (at bedtime)  isosorbide mononitrate 30 mg oral tablet, extended release: 1 tab(s) orally once a day  melatonin 3 mg oral tablet: 1 tab(s) orally once a day (at bedtime)  metoprolol succinate 50 mg oral tablet, extended release: 3 tab(s) orally once a day  sodium bicarbonate 650 mg oral tablet: 2 tab(s) orally 3 times a day  tamsulosin 0.4 mg oral capsule: 1 cap(s) orally once a day (at bedtime)  traZODone 100 mg oral tablet: 1 tab(s) orally once a day (at bedtime)

## 2024-01-30 NOTE — PROVIDER CONTACT NOTE (OTHER) - ACTION/TREATMENT ORDERED:
no new orders
no new orders
Provider made aware.
Provider made aware, try to get a temperature later

## 2024-01-31 VITALS — DIASTOLIC BLOOD PRESSURE: 83 MMHG | SYSTOLIC BLOOD PRESSURE: 147 MMHG

## 2024-01-31 LAB
GLUCOSE BLDC GLUCOMTR-MCNC: 144 MG/DL — HIGH (ref 70–99)
GLUCOSE BLDC GLUCOMTR-MCNC: 154 MG/DL — HIGH (ref 70–99)
GLUCOSE BLDC GLUCOMTR-MCNC: 166 MG/DL — HIGH (ref 70–99)
GLUCOSE BLDC GLUCOMTR-MCNC: 169 MG/DL — HIGH (ref 70–99)

## 2024-01-31 PROCEDURE — 80307 DRUG TEST PRSMV CHEM ANLYZR: CPT

## 2024-01-31 PROCEDURE — 96376 TX/PRO/DX INJ SAME DRUG ADON: CPT

## 2024-01-31 PROCEDURE — 87521 HEPATITIS C PROBE&RVRS TRNSC: CPT

## 2024-01-31 PROCEDURE — 80053 COMPREHEN METABOLIC PANEL: CPT

## 2024-01-31 PROCEDURE — 80048 BASIC METABOLIC PNL TOTAL CA: CPT

## 2024-01-31 PROCEDURE — 99285 EMERGENCY DEPT VISIT HI MDM: CPT

## 2024-01-31 PROCEDURE — 97530 THERAPEUTIC ACTIVITIES: CPT

## 2024-01-31 PROCEDURE — 85025 COMPLETE CBC W/AUTO DIFF WBC: CPT

## 2024-01-31 PROCEDURE — 96374 THER/PROPH/DIAG INJ IV PUSH: CPT

## 2024-01-31 PROCEDURE — 82728 ASSAY OF FERRITIN: CPT

## 2024-01-31 PROCEDURE — 86803 HEPATITIS C AB TEST: CPT

## 2024-01-31 PROCEDURE — 85027 COMPLETE CBC AUTOMATED: CPT

## 2024-01-31 PROCEDURE — 84100 ASSAY OF PHOSPHORUS: CPT

## 2024-01-31 PROCEDURE — 93005 ELECTROCARDIOGRAM TRACING: CPT

## 2024-01-31 PROCEDURE — 96372 THER/PROPH/DIAG INJ SC/IM: CPT

## 2024-01-31 PROCEDURE — 86850 RBC ANTIBODY SCREEN: CPT

## 2024-01-31 PROCEDURE — 81001 URINALYSIS AUTO W/SCOPE: CPT

## 2024-01-31 PROCEDURE — 86901 BLOOD TYPING SEROLOGIC RH(D): CPT

## 2024-01-31 PROCEDURE — 82962 GLUCOSE BLOOD TEST: CPT

## 2024-01-31 PROCEDURE — 99232 SBSQ HOSP IP/OBS MODERATE 35: CPT

## 2024-01-31 PROCEDURE — 97116 GAIT TRAINING THERAPY: CPT

## 2024-01-31 PROCEDURE — 86900 BLOOD TYPING SEROLOGIC ABO: CPT

## 2024-01-31 PROCEDURE — 87086 URINE CULTURE/COLONY COUNT: CPT

## 2024-01-31 PROCEDURE — 36415 COLL VENOUS BLD VENIPUNCTURE: CPT

## 2024-01-31 PROCEDURE — 87635 SARS-COV-2 COVID-19 AMP PRB: CPT

## 2024-01-31 PROCEDURE — 83036 HEMOGLOBIN GLYCOSYLATED A1C: CPT

## 2024-01-31 PROCEDURE — 80164 ASSAY DIPROPYLACETIC ACD TOT: CPT

## 2024-01-31 PROCEDURE — 87637 SARSCOV2&INF A&B&RSV AMP PRB: CPT

## 2024-01-31 PROCEDURE — 82140 ASSAY OF AMMONIA: CPT

## 2024-01-31 PROCEDURE — 83540 ASSAY OF IRON: CPT

## 2024-01-31 PROCEDURE — 83735 ASSAY OF MAGNESIUM: CPT

## 2024-01-31 PROCEDURE — 83550 IRON BINDING TEST: CPT

## 2024-01-31 PROCEDURE — 71045 X-RAY EXAM CHEST 1 VIEW: CPT

## 2024-01-31 RX ORDER — HYDRALAZINE HCL 50 MG
1 TABLET ORAL
Qty: 0 | Refills: 0 | DISCHARGE
Start: 2024-01-31

## 2024-01-31 RX ORDER — INSULIN GLARGINE 100 [IU]/ML
4 INJECTION, SOLUTION SUBCUTANEOUS
Qty: 0 | Refills: 0 | DISCHARGE
Start: 2024-01-31

## 2024-01-31 RX ORDER — LANOLIN ALCOHOL/MO/W.PET/CERES
1 CREAM (GRAM) TOPICAL
Qty: 0 | Refills: 0 | DISCHARGE
Start: 2024-01-31

## 2024-01-31 RX ORDER — HALOPERIDOL DECANOATE 100 MG/ML
1 INJECTION INTRAMUSCULAR
Qty: 0 | Refills: 0 | DISCHARGE
Start: 2024-01-31

## 2024-01-31 RX ORDER — METOPROLOL TARTRATE 50 MG
3 TABLET ORAL
Qty: 0 | Refills: 0 | DISCHARGE
Start: 2024-01-31

## 2024-01-31 RX ORDER — SODIUM BICARBONATE 1 MEQ/ML
2 SYRINGE (ML) INTRAVENOUS
Qty: 0 | Refills: 0 | DISCHARGE
Start: 2024-01-31

## 2024-01-31 RX ADMIN — HALOPERIDOL DECANOATE 1 MILLIGRAM(S): 100 INJECTION INTRAMUSCULAR at 12:50

## 2024-01-31 RX ADMIN — Medication 1300 MILLIGRAM(S): at 21:40

## 2024-01-31 RX ADMIN — Medication 25 MILLIGRAM(S): at 21:39

## 2024-01-31 RX ADMIN — CLOPIDOGREL BISULFATE 75 MILLIGRAM(S): 75 TABLET, FILM COATED ORAL at 12:50

## 2024-01-31 RX ADMIN — Medication 1300 MILLIGRAM(S): at 15:56

## 2024-01-31 RX ADMIN — DIVALPROEX SODIUM 500 MILLIGRAM(S): 500 TABLET, DELAYED RELEASE ORAL at 17:59

## 2024-01-31 RX ADMIN — Medication 25 MILLIGRAM(S): at 15:57

## 2024-01-31 RX ADMIN — INSULIN GLARGINE 4 UNIT(S): 100 INJECTION, SOLUTION SUBCUTANEOUS at 21:39

## 2024-01-31 RX ADMIN — Medication 25 MILLIGRAM(S): at 05:01

## 2024-01-31 RX ADMIN — DIVALPROEX SODIUM 500 MILLIGRAM(S): 500 TABLET, DELAYED RELEASE ORAL at 05:01

## 2024-01-31 RX ADMIN — FINASTERIDE 5 MILLIGRAM(S): 5 TABLET, FILM COATED ORAL at 12:50

## 2024-01-31 RX ADMIN — Medication 1: at 12:49

## 2024-01-31 RX ADMIN — Medication 150 MILLIGRAM(S): at 05:02

## 2024-01-31 RX ADMIN — ISOSORBIDE MONONITRATE 30 MILLIGRAM(S): 60 TABLET, EXTENDED RELEASE ORAL at 12:51

## 2024-01-31 RX ADMIN — Medication 1: at 08:54

## 2024-01-31 RX ADMIN — TAMSULOSIN HYDROCHLORIDE 0.4 MILLIGRAM(S): 0.4 CAPSULE ORAL at 21:39

## 2024-01-31 RX ADMIN — Medication 1300 MILLIGRAM(S): at 05:01

## 2024-01-31 RX ADMIN — Medication 100 MILLIGRAM(S): at 21:39

## 2024-01-31 RX ADMIN — Medication 3 MILLIGRAM(S): at 21:39

## 2024-01-31 NOTE — PROGRESS NOTE ADULT - PROVIDER SPECIALTY LIST ADULT
Internal Medicine
Nephrology
Nephrology
Internal Medicine
Nephrology
Nephrology
Internal Medicine
Nephrology
Internal Medicine
Nephrology

## 2024-01-31 NOTE — BH CONSULTATION LIAISON PROGRESS NOTE - NSBHMSETHTPROC_PSY_A_CORE
Tangential
Unable to assess
Tangential
Unable to assess
Tangential
Tangential
Unable to assess

## 2024-01-31 NOTE — BH CONSULTATION LIAISON PROGRESS NOTE - LEVEL OF CONSCIOUSNESS
Alert
Alert
Lethargic, arousable to verbal stimulus
Lethargic, arousable to verbal stimulus
Alert
Lethargic, arousable to verbal stimulus
Lethargic, arousable to verbal stimulus
Alert
Alert
Lethargic, arousable to verbal stimulus
Alert
Lethargic, arousable to verbal stimulus

## 2024-01-31 NOTE — BH CONSULTATION LIAISON PROGRESS NOTE - NSBHPTASSESSDT_PSY_A_CORE
23-Jan-2024 11:50
21-Jan-2024 11:15
12-Jan-2024 12:10
19-Jan-2024 16:31
18-Jan-2024 15:31
22-Jan-2024 14:56
31-Jan-2024 17:03
13-Jan-2024 13:40
10-Mikel-2024 17:10
20-Jan-2024 13:49
17-Jan-2024 16:51
16-Jan-2024 14:49
14-Jan-2024 11:25
29-Jan-2024 16:20
08-Jan-2024 17:53
15-Mikel-2024 12:18
11-Jan-2024 15:56
09-Jan-2024 17:47

## 2024-01-31 NOTE — BH CONSULTATION LIAISON PROGRESS NOTE - NSBHATTESTTYPEVISIT_PSY_A_CORE
Attending Only

## 2024-01-31 NOTE — BH CONSULTATION LIAISON PROGRESS NOTE - NSBHMSEKNOW_PSY_A_CORE
Normal
Impaired
Normal
Impaired
Normal
Normal
Impaired
Normal

## 2024-01-31 NOTE — DISCHARGE NOTE NURSING/CASE MANAGEMENT/SOCIAL WORK - PATIENT PORTAL LINK FT
You can access the FollowMyHealth Patient Portal offered by [facility] by registering at the following website: [email or web address]. By joining [facility]’s FollowMyHealth portal, you will also be able to view your health information using other applications (apps) compatible with our system.

## 2024-01-31 NOTE — BH CONSULTATION LIAISON PROGRESS NOTE - NSBHMSEATTEN_PSY_A_CORE
Normal
Impaired
Normal

## 2024-01-31 NOTE — BH CONSULTATION LIAISON PROGRESS NOTE - NSBHPSYCHOLCOGABN_PSY_A_CORE
disoriented to time
disoriented to time/disoriented to place/disoriented to situation
disoriented to time
(1) bedfast
disoriented to time

## 2024-01-31 NOTE — BH CONSULTATION LIAISON PROGRESS NOTE - NSBHADMITIPOBS_PSY_A_CORE
Constant observation
Enhanced supervision
Routine observation
Enhanced supervision
Constant observation
Enhanced supervision
Enhanced supervision
Constant observation
Constant observation
Routine observation
Constant observation
Constant observation
Routine observation
Routine observation
Constant observation
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
## 2024-01-31 NOTE — BH CONSULTATION LIAISON PROGRESS NOTE - NSBHCONSULTFOLLOW_PSY_ALL_CORE
Yes...
No, psychiatric follow up needed - call with questions...
Yes...

## 2024-01-31 NOTE — BH CONSULTATION LIAISON PROGRESS NOTE - NSBHCONSULTMEDSEVERE_PSY_A_CORE FT
Haldol 2-4mg i.v/i.m. Q6H PRN

## 2024-01-31 NOTE — CHART NOTE - NSCHARTNOTEFT_GEN_A_CORE
Request from Dr. Granger to facilitate patient discharge.  Medication reconciliation reviewed, revised, and resolved with Dr. Granger, who has medically cleared patient for discharge with follow up as advised.  Please refer to discharge note for detailed hospital course.

## 2024-01-31 NOTE — DISCHARGE NOTE NURSING/CASE MANAGEMENT/SOCIAL WORK - NSDCFUADDAPPT_GEN_ALL_CORE_FT
You must have a BMP drawn weekly to monitor your potassium and creatinine levels - your first BMP should be drawn on 2/2/2024.    You must follow up with your primary medical doctor within 1-2 days of discharge - please call to make an appointment.    You must follow up with your nephrologist, Dr. Virk, within one week of discharge - please call to make an appointment.    You must follow up with your psychiatrist within one week of discharge.  You can follow up at the [facility] [phone], or [facility], or a provider at your current residence.          APPTS ARE READY TO BE MADE: [ X] YES    Best Family or Patient Contact (if needed):    Additional Information about above appointments (if needed):    1: Primary medical doctor  2: Nephrologist  3: Psychiatrist    Other comments or requests:

## 2024-01-31 NOTE — BH CONSULTATION LIAISON PROGRESS NOTE - NSBHCONSULTRECOMMENDOTHER_PSY_A_CORE FT
Depakote ER 500mg p.o. BID  trazodone 100mg p.o. at bedtime  Haldol 2mg p.o. at bedtime   melatonin 3mg p.o. at bedtime
Haldol 1mg p.o. AM and 2mg p.o. at bedtime   Depakote ER 500mg p.o. BID  trazodone 100mg p.o. at bedtime  melatonin 3mg p.o. at bedtime
Haldol 1mg p.o. AM and 2mg p.o. at bedtime   Depakote ER 500mg p.o. BID  trazodone 100mg p.o. at bedtime  melatonin 3mg p.o. at bedtime
Adjust Haldol to 1mg p.o. AM and 2mg p.o. at bedtime   Depakote ER 500mg p.o. BID  trazodone 100mg p.o. at bedtime  melatonin 3mg p.o. at bedtime
Haldol 1mg p.o. AM and 2mg p.o. at bedtime   Depakote ER 500mg p.o. BID  trazodone 100mg p.o. at bedtime  melatonin 3mg p.o. at bedtime
Depakote ER 500mg p.o. BID  trazodone 100mg p.o. at bedtime  Haldol 2mg p.o. at bedtime   melatonin 3mg p.o. at bedtime
Haldol 1mg p.o. AM and 2mg p.o. at bedtime   Depakote ER 500mg p.o. BID  trazodone 100mg p.o. at bedtime  melatonin 3mg p.o. at bedtime
Haldol 1mg p.o. AM and 2mg p.o. at bedtime   Depakote ER 500mg p.o. BID  trazodone 100mg p.o. at bedtime  melatonin 3mg p.o. at bedtime
Depakote ER 500mg p.o. BID  trazodone 100mg p.o. at bedtime  Haldol 2mg p.o. at bedtime   melatonin 3mg p.o. at bedtime
Haldol 1mg p.o. AM and 2mg p.o. at bedtime   Depakote ER 500mg p.o. BID  trazodone 100mg p.o. at bedtime  melatonin 3mg p.o. at bedtime
Depakote ER 500mg p.o. BID  trazodone 100mg p.o. at bedtime  Haldol 2mg p.o. at bedtime   melatonin 3mg p.o. at bedtime

## 2024-01-31 NOTE — BH CONSULTATION LIAISON PROGRESS NOTE - NSBHMSEMOOD_PSY_A_CORE
Unable to assess
Irritable
Irritable
Unable to assess
Irritable
Unable to assess
Irritable

## 2024-01-31 NOTE — BH CONSULTATION LIAISON PROGRESS NOTE - NSBHMSEREMMEM_PSY_A_CORE
Impaired
Normal
Impaired
Impaired
Normal

## 2024-01-31 NOTE — BH CONSULTATION LIAISON PROGRESS NOTE - NSBHPSYCHOLCOGORIENT_PSY_A_CORE
Not fully oriented...

## 2024-01-31 NOTE — BH CONSULTATION LIAISON PROGRESS NOTE - NSBHCONSULTFOLLOWAFTERCARE_PSY_A_CORE FT
can f/u [facility] [phone] or [facility] or a provider at his residential facility.
can f/u [facility] [phone] or [facility] or a provider at his residential facility.
can f/u [facility]

## 2024-01-31 NOTE — BH CONSULTATION LIAISON PROGRESS NOTE - NSBHMSETHTASSOC_PSY_A_CORE
Loose
Unable to assess
Unable to assess
Loose
Unable to assess
Loose
Loose
Unable to assess
Loose
Loose
Unable to assess
Unable to assess
Loose

## 2024-01-31 NOTE — BH CONSULTATION LIAISON PROGRESS NOTE - NSBHDSMAXES_PSY_ALL_CORE
See above

## 2024-01-31 NOTE — BH CONSULTATION LIAISON PROGRESS NOTE - NSBHCHARTREVIEWINVESTIGATE_PSY_A_CORE FT
< from: 12 Lead ECG (01.04.24 @ 23:51) >      Ventricular Rate 69 BPM    Atrial Rate 69 BPM    P-R Interval 160 ms    QRS Duration 76 ms    Q-T Interval 398 ms    QTC Calculation(Bazett) 426 ms    P Axis 63 degrees    R Axis 72 degrees    T Axis 52 degrees    Diagnosis Line BASELINE ARTIFACT  NORMAL SINUS RHYTHM  NORMAL ECG  WHEN COMPARED WITH ECG OF 04-JAN-2024 12:45, (UNCONFIRMED)  NO SIGNIFICANT CHANGE WAS FOUND  Confirmed by MD Minh, Mukesh ([id number]) on 1/5/2024 7:37:24 PM    < end of copied text >

## 2024-01-31 NOTE — BH CONSULTATION LIAISON PROGRESS NOTE - NSBHMSESPEECH_PSY_A_CORE
Normal volume, rate, productivity, spontaneity and articulation
Non-verbal: unable to assess speech further
Normal volume, rate, productivity, spontaneity and articulation
Non-verbal: unable to assess speech further
Non-verbal: unable to assess speech further

## 2024-01-31 NOTE — BH CONSULTATION LIAISON PROGRESS NOTE - NSBHMSEAFFRANGE_PSY_A_CORE
Constricted
Blunted
Constricted
Blunted
Constricted
Blunted
Constricted
Constricted

## 2024-01-31 NOTE — BH CONSULTATION LIAISON PROGRESS NOTE - NSBHINDICATION_PSY_ALL_CORE
disorganized behavior, elopement risk

## 2024-01-31 NOTE — BH CONSULTATION LIAISON PROGRESS NOTE - NSBHFUPINTERVALCCFT_PSY_A_CORE
seen for f/u
All I want to do is go back
I am going to need an ear surgery.
I am leaving soon!
"I want to go back!"
I need a medical  
I am OK.. you see?
I am ready to get out of here
"Who are you?"
I am ready to leave already
"I want to go! Where is my wallet?"
"Leave me alone."
"When am I leaving?"
leave me alone 
I am looking to go from here, man.
ok 
"I want my food!"
get out of here
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
## 2024-01-31 NOTE — BH CONSULTATION LIAISON PROGRESS NOTE - NSBHFUPREASONCONS_PSY_A_CORE
psychosis/med management
psychosis/med management
delirium/psychosis
delirium/psychosis
delirium/psychosis/med management
psychosis/med management
delirium/psychosis
psychosis/med management
delirium/psychosis
delirium/psychosis/med management
delirium/psychosis
delirium/psychosis/med management
delirium/psychosis

## 2024-01-31 NOTE — BH CONSULTATION LIAISON PROGRESS NOTE - NSBHMSEMUSCLE_PSY_A_CORE
-Prick testing to Pre-Pen and Penicillin G 10,000 units negative. Intradermal testing to Pre-Pen and Penicillin G 10,000 units negative in duplicate.  - The negative predictive value for penicillin testing with PrePen and Penicillin G is approximately 98%.   -Continue Fluticasone (Flonase) Nasal Spray 2 sprays to each nostril daily. Avoid pointing the applicator at the nasal septum.   -Start Azelastine Nasal Spray (Astelin) 0.1% two sprays to each nostril twice daily. Avoid pointing the applicator at the nasal septum.    -Hold oral antihistamines (Cetirizine, LevoCetirizine) may be drying  -Use rewetting eye drops every few hours during the day  -See ENT  -Keep appointment with eye doctor    Allergy Skin Test      Prick Applied by: Shagufta Bridges RN 9/6/2022  3:07 PM   Prick Read by: Radha Edwards MD 9/6/2022  3:27 PM      Intradermal Applied by: Shagufta Bridges RN 9/6/2022  4:00 PM   Intradermal Read by: Radha Edwards MD 9/6/2022  4:20 PM      Site (prick): Back    Site (intradermal): Arm left    : Dipti      Is the patient on beta-blockers?: No Is the patient pregnant?: No   Is the patient on antihistamines?: No Is Asthma stable?: N/A          Allergy Skin Testing - Prick       ALLERGEN ROUTE REACTION WHEAL FLARE COMMENT    Maple Prick Negative       Elm Prick Negative       Oak Prick Negative       Poplar Prick Negative       Foster Prick Negative       Birch  Prick Negative       H[place]burg Prick Negative       Darron Prick Negative       Juniper Prick Negative       Saulsbury Prick Negative         ALLERGEN ROUTE REACTION WHEAL FLARE COMMENT    Ragweed Mix Prick Negative       Cocklebur Prick Positive 7 7     English Plantain Prick Positive 7 7     Mugwort Prick Negative       Russian Thistle Prick Negative       Lamb's quarter Prick Negative       Dock / Sorrel Prick Negative       Marshelder Prick Negative       Pigweed Prick Negative       Kochia Prick Negative         ALLERGEN ROUTE 
Normal muscle tone/strength
REACTION WHEAL FLARE COMMENT    Grass Prick Negative       Jose Prick Positive 7 7     Bermuda Prick Negative       House Dust Mite Farinae Prick Negative       House Dust Mite  Pteronyssinus Prick Positive 7 7     AP Dog Prick Negative       Cat Prick Negative       Mouse Prick Negative       Cockroach Prick Negative       Feathers Prick Negative         ALLERGEN ROUTE REACTION WHEAL FLARE COMMENT    Aspergillus Prick Negative       Alternaria Prick Negative       Cladosporium Prick Negative       Penicillium Prick Negative       Bipolaris Prick Negative       Epicoccum Prick Negative       Curvularia Prick Negative       Fusarium Prick Negative       Rhizopus Prick Negative       Chaetomium Prick Negative         ALLERGEN ROUTE REACTION WHEAL FLARE COMMENT    Saline Negative Control Prick Negative 5 7     Histamine Positive Control Prick Positive 11 30           Allergy Skin Testing - Intradermal       ALLERGEN ROUTE REACTION WHEAL FLARE COMMENT    Maple Intradermal Negative       Elm Intradermal Negative       Oak Intradermal Negative       Mecosta Intradermal Negative       Birch Intradermal Negative       Darron Intradermal Negative       Grass Mix Intradermal Negative       Dust Mite Farinae Intradermal Negative       Dust Mite Pteronyssinus Intradermal Negative       AP Dog Intradermal Negative       Cat Intradermal Negative       Ragweed Mix Intradermal Negative       Aspergillus Intradermal Negative       Alternaria Intradermal Negative       Cladosporium Intradermal Negative       Penicillium Intradermal Negative       Saline Negative Control Intradermal Negative 7 0             
Normal muscle tone/strength

## 2024-01-31 NOTE — BH CONSULTATION LIAISON PROGRESS NOTE - NSBHMSEKNOWHOW_PSY_ALL_CORE
Current Events

## 2024-01-31 NOTE — PROGRESS NOTE ADULT - REASON FOR ADMISSION
schizophrenia

## 2024-01-31 NOTE — BH CONSULTATION LIAISON PROGRESS NOTE - CURRENT MEDICATION
MEDICATIONS  (STANDING):  calcium gluconate IVPB 1 Gram(s) IV Intermittent once  clopidogrel Tablet 75 milliGRAM(s) Oral daily  dextrose 5%. 1000 milliLiter(s) (50 mL/Hr) IV Continuous <Continuous>  dextrose 5%. 1000 milliLiter(s) (100 mL/Hr) IV Continuous <Continuous>  dextrose 50% Injectable 12.5 Gram(s) IV Push once  dextrose 50% Injectable 25 Gram(s) IV Push once  dextrose 50% Injectable 25 Gram(s) IV Push once  divalproex  milliGRAM(s) Oral two times a day  finasteride 5 milliGRAM(s) Oral daily  glipiZIDE. 5 milliGRAM(s) Oral daily  glucagon  Injectable 1 milliGRAM(s) IntraMuscular once  haloperidol     Tablet 2 milliGRAM(s) Oral at bedtime  hydrALAZINE 25 milliGRAM(s) Oral three times a day  insulin glargine Injectable (LANTUS) 8 Unit(s) SubCutaneous at bedtime  insulin lispro (ADMELOG) corrective regimen sliding scale   SubCutaneous three times a day before meals  isosorbide   mononitrate ER Tablet (IMDUR) 30 milliGRAM(s) Oral daily  metoprolol tartrate 100 milliGRAM(s) Oral daily  sodium bicarbonate 1300 milliGRAM(s) Oral three times a day  tamsulosin 0.4 milliGRAM(s) Oral at bedtime  traZODone 100 milliGRAM(s) Oral at bedtime    MEDICATIONS  (PRN):  dextrose Oral Gel 15 Gram(s) Oral once PRN Blood Glucose LESS THAN 70 milliGRAM(s)/deciliter  haloperidol    Injectable 2 milliGRAM(s) IV Push every 6 hours PRN agitation  
MEDICATIONS  (STANDING):  clopidogrel Tablet 75 milliGRAM(s) Oral daily  dextrose 5%. 1000 milliLiter(s) (50 mL/Hr) IV Continuous <Continuous>  dextrose 5%. 1000 milliLiter(s) (100 mL/Hr) IV Continuous <Continuous>  dextrose 50% Injectable 12.5 Gram(s) IV Push once  dextrose 50% Injectable 25 Gram(s) IV Push once  dextrose 50% Injectable 25 Gram(s) IV Push once  divalproex  milliGRAM(s) Oral two times a day  finasteride 5 milliGRAM(s) Oral daily  glipiZIDE. 5 milliGRAM(s) Oral daily  glucagon  Injectable 1 milliGRAM(s) IntraMuscular once  haloperidol     Tablet 2 milliGRAM(s) Oral at bedtime  hydrALAZINE 25 milliGRAM(s) Oral three times a day  insulin glargine Injectable (LANTUS) 4 Unit(s) SubCutaneous at bedtime  insulin lispro (ADMELOG) corrective regimen sliding scale   SubCutaneous three times a day before meals  isosorbide   mononitrate ER Tablet (IMDUR) 30 milliGRAM(s) Oral daily  metoprolol tartrate 100 milliGRAM(s) Oral daily  sodium bicarbonate 1300 milliGRAM(s) Oral three times a day  tamsulosin 0.4 milliGRAM(s) Oral at bedtime  traZODone 100 milliGRAM(s) Oral at bedtime    MEDICATIONS  (PRN):  dextrose Oral Gel 15 Gram(s) Oral once PRN Blood Glucose LESS THAN 70 milliGRAM(s)/deciliter  haloperidol    Injectable 2 milliGRAM(s) IV Push every 6 hours PRN agitation  
MEDICATIONS  (STANDING):  clopidogrel Tablet 75 milliGRAM(s) Oral daily  divalproex  milliGRAM(s) Oral two times a day  finasteride 5 milliGRAM(s) Oral daily  haloperidol     Tablet 2 milliGRAM(s) Oral at bedtime  haloperidol     Tablet 1 milliGRAM(s) Oral daily  hydrALAZINE 25 milliGRAM(s) Oral three times a day  insulin glargine Injectable (LANTUS) 4 Unit(s) SubCutaneous at bedtime  insulin lispro (ADMELOG) corrective regimen sliding scale   SubCutaneous three times a day before meals  isosorbide   mononitrate ER Tablet (IMDUR) 30 milliGRAM(s) Oral daily  melatonin 3 milliGRAM(s) Oral at bedtime  metoprolol tartrate 100 milliGRAM(s) Oral daily  sodium bicarbonate 1300 milliGRAM(s) Oral three times a day  tamsulosin 0.4 milliGRAM(s) Oral at bedtime  traZODone 100 milliGRAM(s) Oral at bedtime    MEDICATIONS  (PRN):  dextrose Oral Gel 15 Gram(s) Oral once PRN Blood Glucose LESS THAN 70 milliGRAM(s)/deciliter  haloperidol    Injectable 2 milliGRAM(s) IV Push every 6 hours PRN agitation  
MEDICATIONS  (STANDING):  clopidogrel Tablet 75 milliGRAM(s) Oral daily  divalproex  milliGRAM(s) Oral two times a day  finasteride 5 milliGRAM(s) Oral daily  haloperidol     Tablet 2 milliGRAM(s) Oral at bedtime  haloperidol     Tablet 1 milliGRAM(s) Oral daily  hydrALAZINE 25 milliGRAM(s) Oral three times a day  insulin glargine Injectable (LANTUS) 4 Unit(s) SubCutaneous at bedtime  insulin lispro (ADMELOG) corrective regimen sliding scale   SubCutaneous three times a day before meals  isosorbide   mononitrate ER Tablet (IMDUR) 30 milliGRAM(s) Oral daily  melatonin 3 milliGRAM(s) Oral at bedtime  metoprolol succinate  milliGRAM(s) Oral daily  sodium bicarbonate 1300 milliGRAM(s) Oral three times a day  tamsulosin 0.4 milliGRAM(s) Oral at bedtime  traZODone 100 milliGRAM(s) Oral at bedtime    MEDICATIONS  (PRN):  dextrose Oral Gel 15 Gram(s) Oral once PRN Blood Glucose LESS THAN 70 milliGRAM(s)/deciliter  haloperidol    Injectable 2 milliGRAM(s) IV Push every 6 hours PRN agitation  
MEDICATIONS  (STANDING):  clopidogrel Tablet 75 milliGRAM(s) Oral daily  divalproex  milliGRAM(s) Oral two times a day  finasteride 5 milliGRAM(s) Oral daily  haloperidol     Tablet 1 milliGRAM(s) Oral daily  haloperidol     Tablet 2 milliGRAM(s) Oral at bedtime  hydrALAZINE 25 milliGRAM(s) Oral three times a day  insulin glargine Injectable (LANTUS) 4 Unit(s) SubCutaneous at bedtime  insulin lispro (ADMELOG) corrective regimen sliding scale   SubCutaneous three times a day before meals  isosorbide   mononitrate ER Tablet (IMDUR) 30 milliGRAM(s) Oral daily  melatonin 3 milliGRAM(s) Oral at bedtime  metoprolol tartrate 100 milliGRAM(s) Oral daily  sodium bicarbonate 1300 milliGRAM(s) Oral three times a day  tamsulosin 0.4 milliGRAM(s) Oral at bedtime  traZODone 100 milliGRAM(s) Oral at bedtime    MEDICATIONS  (PRN):  dextrose Oral Gel 15 Gram(s) Oral once PRN Blood Glucose LESS THAN 70 milliGRAM(s)/deciliter  haloperidol    Injectable 2 milliGRAM(s) IV Push every 6 hours PRN agitation  
MEDICATIONS  (STANDING):  clopidogrel Tablet 75 milliGRAM(s) Oral daily  divalproex  milliGRAM(s) Oral two times a day  finasteride 5 milliGRAM(s) Oral daily  haloperidol     Tablet 2 milliGRAM(s) Oral at bedtime  haloperidol     Tablet 1 milliGRAM(s) Oral daily  hydrALAZINE 25 milliGRAM(s) Oral three times a day  insulin glargine Injectable (LANTUS) 4 Unit(s) SubCutaneous at bedtime  insulin lispro (ADMELOG) corrective regimen sliding scale   SubCutaneous three times a day before meals  isosorbide   mononitrate ER Tablet (IMDUR) 30 milliGRAM(s) Oral daily  melatonin 3 milliGRAM(s) Oral at bedtime  metoprolol tartrate 100 milliGRAM(s) Oral daily  sodium bicarbonate 1300 milliGRAM(s) Oral three times a day  tamsulosin 0.4 milliGRAM(s) Oral at bedtime  traZODone 100 milliGRAM(s) Oral at bedtime    MEDICATIONS  (PRN):  dextrose Oral Gel 15 Gram(s) Oral once PRN Blood Glucose LESS THAN 70 milliGRAM(s)/deciliter  haloperidol    Injectable 2 milliGRAM(s) IV Push every 6 hours PRN agitation  
MEDICATIONS  (STANDING):  clopidogrel Tablet 75 milliGRAM(s) Oral daily  darbepoetin Injectable ViaL 40 MICROGram(s) SubCutaneous once  divalproex  milliGRAM(s) Oral two times a day  finasteride 5 milliGRAM(s) Oral daily  haloperidol     Tablet 2 milliGRAM(s) Oral at bedtime  haloperidol     Tablet 1 milliGRAM(s) Oral daily  hydrALAZINE 25 milliGRAM(s) Oral three times a day  insulin glargine Injectable (LANTUS) 4 Unit(s) SubCutaneous at bedtime  insulin lispro (ADMELOG) corrective regimen sliding scale   SubCutaneous three times a day before meals  isosorbide   mononitrate ER Tablet (IMDUR) 30 milliGRAM(s) Oral daily  metoprolol tartrate 100 milliGRAM(s) Oral daily  sodium bicarbonate 1300 milliGRAM(s) Oral three times a day  tamsulosin 0.4 milliGRAM(s) Oral at bedtime  traZODone 100 milliGRAM(s) Oral at bedtime    MEDICATIONS  (PRN):  dextrose Oral Gel 15 Gram(s) Oral once PRN Blood Glucose LESS THAN 70 milliGRAM(s)/deciliter  haloperidol    Injectable 2 milliGRAM(s) IV Push every 6 hours PRN agitation  
MEDICATIONS  (STANDING):  clopidogrel Tablet 75 milliGRAM(s) Oral daily  dextrose 5%. 1000 milliLiter(s) (100 mL/Hr) IV Continuous <Continuous>  dextrose 5%. 1000 milliLiter(s) (50 mL/Hr) IV Continuous <Continuous>  dextrose 50% Injectable 25 Gram(s) IV Push once  dextrose 50% Injectable 25 Gram(s) IV Push once  dextrose 50% Injectable 12.5 Gram(s) IV Push once  divalproex  milliGRAM(s) Oral two times a day  finasteride 5 milliGRAM(s) Oral daily  glipiZIDE. 5 milliGRAM(s) Oral daily  glucagon  Injectable 1 milliGRAM(s) IntraMuscular once  haloperidol     Tablet 2 milliGRAM(s) Oral at bedtime  hydrALAZINE 25 milliGRAM(s) Oral three times a day  insulin glargine Injectable (LANTUS) 4 Unit(s) SubCutaneous at bedtime  insulin lispro (ADMELOG) corrective regimen sliding scale   SubCutaneous three times a day before meals  isosorbide   mononitrate ER Tablet (IMDUR) 30 milliGRAM(s) Oral daily  metoprolol tartrate 100 milliGRAM(s) Oral daily  nystatin Powder 1 Application(s) Topical two times a day  sodium bicarbonate 1300 milliGRAM(s) Oral three times a day  tamsulosin 0.4 milliGRAM(s) Oral at bedtime  traZODone 100 milliGRAM(s) Oral at bedtime    MEDICATIONS  (PRN):  dextrose Oral Gel 15 Gram(s) Oral once PRN Blood Glucose LESS THAN 70 milliGRAM(s)/deciliter  haloperidol    Injectable 2 milliGRAM(s) IV Push every 6 hours PRN agitation  
MEDICATIONS  (STANDING):  clopidogrel Tablet 75 milliGRAM(s) Oral daily  dextrose 5%. 1000 milliLiter(s) (50 mL/Hr) IV Continuous <Continuous>  dextrose 5%. 1000 milliLiter(s) (100 mL/Hr) IV Continuous <Continuous>  dextrose 50% Injectable 12.5 Gram(s) IV Push once  dextrose 50% Injectable 25 Gram(s) IV Push once  dextrose 50% Injectable 25 Gram(s) IV Push once  divalproex  milliGRAM(s) Oral two times a day  finasteride 5 milliGRAM(s) Oral daily  glipiZIDE. 5 milliGRAM(s) Oral daily  glucagon  Injectable 1 milliGRAM(s) IntraMuscular once  haloperidol     Tablet 2 milliGRAM(s) Oral at bedtime  hydrALAZINE 25 milliGRAM(s) Oral three times a day  insulin glargine Injectable (LANTUS) 8 Unit(s) SubCutaneous at bedtime  insulin lispro (ADMELOG) corrective regimen sliding scale   SubCutaneous three times a day before meals  isosorbide   mononitrate ER Tablet (IMDUR) 30 milliGRAM(s) Oral daily  metoprolol tartrate 100 milliGRAM(s) Oral daily  sodium bicarbonate 1300 milliGRAM(s) Oral three times a day  sodium zirconium cyclosilicate 10 Gram(s) Oral three times a day  tamsulosin 0.4 milliGRAM(s) Oral at bedtime  traZODone 100 milliGRAM(s) Oral at bedtime    MEDICATIONS  (PRN):  dextrose Oral Gel 15 Gram(s) Oral once PRN Blood Glucose LESS THAN 70 milliGRAM(s)/deciliter  haloperidol    Injectable 2 milliGRAM(s) IV Push every 6 hours PRN agitation  
MEDICATIONS  (STANDING):  clopidogrel Tablet 75 milliGRAM(s) Oral daily  divalproex  milliGRAM(s) Oral two times a day  finasteride 5 milliGRAM(s) Oral daily  haloperidol     Tablet 2 milliGRAM(s) Oral at bedtime  haloperidol     Tablet 1 milliGRAM(s) Oral daily  hydrALAZINE 25 milliGRAM(s) Oral three times a day  insulin glargine Injectable (LANTUS) 4 Unit(s) SubCutaneous at bedtime  insulin lispro (ADMELOG) corrective regimen sliding scale   SubCutaneous three times a day before meals  isosorbide   mononitrate ER Tablet (IMDUR) 30 milliGRAM(s) Oral daily  melatonin 3 milliGRAM(s) Oral at bedtime  metoprolol tartrate 100 milliGRAM(s) Oral daily  sodium bicarbonate 1300 milliGRAM(s) Oral three times a day  tamsulosin 0.4 milliGRAM(s) Oral at bedtime  traZODone 100 milliGRAM(s) Oral at bedtime    MEDICATIONS  (PRN):  dextrose Oral Gel 15 Gram(s) Oral once PRN Blood Glucose LESS THAN 70 milliGRAM(s)/deciliter  haloperidol    Injectable 2 milliGRAM(s) IV Push every 6 hours PRN agitation  
MEDICATIONS  (STANDING):  clopidogrel Tablet 75 milliGRAM(s) Oral daily  dextrose 5%. 1000 milliLiter(s) (50 mL/Hr) IV Continuous <Continuous>  dextrose 5%. 1000 milliLiter(s) (100 mL/Hr) IV Continuous <Continuous>  dextrose 50% Injectable 12.5 Gram(s) IV Push once  dextrose 50% Injectable 25 Gram(s) IV Push once  dextrose 50% Injectable 25 Gram(s) IV Push once  divalproex  milliGRAM(s) Oral two times a day  finasteride 5 milliGRAM(s) Oral daily  glipiZIDE. 5 milliGRAM(s) Oral daily  glucagon  Injectable 1 milliGRAM(s) IntraMuscular once  haloperidol     Tablet 2 milliGRAM(s) Oral at bedtime  hydrALAZINE 25 milliGRAM(s) Oral three times a day  insulin glargine Injectable (LANTUS) 8 Unit(s) SubCutaneous at bedtime  insulin lispro (ADMELOG) corrective regimen sliding scale   SubCutaneous three times a day before meals  isosorbide   mononitrate ER Tablet (IMDUR) 30 milliGRAM(s) Oral daily  metoprolol tartrate 100 milliGRAM(s) Oral daily  sodium bicarbonate 1300 milliGRAM(s) Oral three times a day  tamsulosin 0.4 milliGRAM(s) Oral at bedtime  traZODone 100 milliGRAM(s) Oral at bedtime    MEDICATIONS  (PRN):  dextrose Oral Gel 15 Gram(s) Oral once PRN Blood Glucose LESS THAN 70 milliGRAM(s)/deciliter  haloperidol    Injectable 2 milliGRAM(s) IV Push every 6 hours PRN agitation  
MEDICATIONS  (STANDING):  clopidogrel Tablet 75 milliGRAM(s) Oral daily  divalproex  milliGRAM(s) Oral two times a day  finasteride 5 milliGRAM(s) Oral daily  haloperidol     Tablet 2 milliGRAM(s) Oral at bedtime  haloperidol     Tablet 1 milliGRAM(s) Oral daily  hydrALAZINE 25 milliGRAM(s) Oral three times a day  insulin glargine Injectable (LANTUS) 4 Unit(s) SubCutaneous at bedtime  insulin lispro (ADMELOG) corrective regimen sliding scale   SubCutaneous three times a day before meals  isosorbide   mononitrate ER Tablet (IMDUR) 30 milliGRAM(s) Oral daily  melatonin 3 milliGRAM(s) Oral at bedtime  metoprolol tartrate 100 milliGRAM(s) Oral daily  sodium bicarbonate 1300 milliGRAM(s) Oral three times a day  tamsulosin 0.4 milliGRAM(s) Oral at bedtime  traZODone 100 milliGRAM(s) Oral at bedtime    MEDICATIONS  (PRN):  dextrose Oral Gel 15 Gram(s) Oral once PRN Blood Glucose LESS THAN 70 milliGRAM(s)/deciliter  haloperidol    Injectable 2 milliGRAM(s) IV Push every 6 hours PRN agitation  
MEDICATIONS  (STANDING):  clopidogrel Tablet 75 milliGRAM(s) Oral daily  divalproex  milliGRAM(s) Oral two times a day  finasteride 5 milliGRAM(s) Oral daily  haloperidol     Tablet 1 milliGRAM(s) Oral daily  haloperidol     Tablet 2 milliGRAM(s) Oral at bedtime  hydrALAZINE 25 milliGRAM(s) Oral three times a day  insulin glargine Injectable (LANTUS) 4 Unit(s) SubCutaneous at bedtime  insulin lispro (ADMELOG) corrective regimen sliding scale   SubCutaneous three times a day before meals  isosorbide   mononitrate ER Tablet (IMDUR) 30 milliGRAM(s) Oral daily  melatonin 3 milliGRAM(s) Oral at bedtime  metoprolol succinate  milliGRAM(s) Oral daily  sodium bicarbonate 1300 milliGRAM(s) Oral three times a day  tamsulosin 0.4 milliGRAM(s) Oral at bedtime  traZODone 100 milliGRAM(s) Oral at bedtime    MEDICATIONS  (PRN):  dextrose Oral Gel 15 Gram(s) Oral once PRN Blood Glucose LESS THAN 70 milliGRAM(s)/deciliter  haloperidol    Injectable 2 milliGRAM(s) IV Push every 6 hours PRN agitation  
MEDICATIONS  (STANDING):  clopidogrel Tablet 75 milliGRAM(s) Oral daily  dextrose 5%. 1000 milliLiter(s) (100 mL/Hr) IV Continuous <Continuous>  dextrose 5%. 1000 milliLiter(s) (50 mL/Hr) IV Continuous <Continuous>  dextrose 50% Injectable 12.5 Gram(s) IV Push once  dextrose 50% Injectable 25 Gram(s) IV Push once  dextrose 50% Injectable 25 Gram(s) IV Push once  divalproex  milliGRAM(s) Oral two times a day  finasteride 5 milliGRAM(s) Oral daily  glipiZIDE. 5 milliGRAM(s) Oral daily  glucagon  Injectable 1 milliGRAM(s) IntraMuscular once  haloperidol     Tablet 2 milliGRAM(s) Oral at bedtime  hydrALAZINE 25 milliGRAM(s) Oral three times a day  insulin glargine Injectable (LANTUS) 8 Unit(s) SubCutaneous at bedtime  insulin lispro (ADMELOG) corrective regimen sliding scale   SubCutaneous three times a day before meals  isosorbide   mononitrate ER Tablet (IMDUR) 30 milliGRAM(s) Oral daily  metoprolol tartrate 100 milliGRAM(s) Oral daily  sodium bicarbonate 1300 milliGRAM(s) Oral three times a day  sodium zirconium cyclosilicate 10 Gram(s) Oral three times a day  tamsulosin 0.4 milliGRAM(s) Oral at bedtime  traZODone 100 milliGRAM(s) Oral at bedtime    MEDICATIONS  (PRN):  dextrose Oral Gel 15 Gram(s) Oral once PRN Blood Glucose LESS THAN 70 milliGRAM(s)/deciliter  haloperidol    Injectable 2 milliGRAM(s) IV Push every 6 hours PRN agitation  
MEDICATIONS  (STANDING):  clopidogrel Tablet 75 milliGRAM(s) Oral daily  dextrose 5%. 1000 milliLiter(s) (100 mL/Hr) IV Continuous <Continuous>  dextrose 5%. 1000 milliLiter(s) (50 mL/Hr) IV Continuous <Continuous>  dextrose 50% Injectable 12.5 Gram(s) IV Push once  dextrose 50% Injectable 25 Gram(s) IV Push once  dextrose 50% Injectable 25 Gram(s) IV Push once  divalproex  milliGRAM(s) Oral two times a day  finasteride 5 milliGRAM(s) Oral daily  glipiZIDE. 5 milliGRAM(s) Oral daily  glucagon  Injectable 1 milliGRAM(s) IntraMuscular once  haloperidol     Tablet 2 milliGRAM(s) Oral at bedtime  hydrALAZINE 25 milliGRAM(s) Oral three times a day  insulin glargine Injectable (LANTUS) 8 Unit(s) SubCutaneous at bedtime  insulin lispro (ADMELOG) corrective regimen sliding scale   SubCutaneous three times a day before meals  isosorbide   mononitrate ER Tablet (IMDUR) 30 milliGRAM(s) Oral daily  metoprolol tartrate 100 milliGRAM(s) Oral daily  sodium bicarbonate 650 milliGRAM(s) Oral three times a day  tamsulosin 0.4 milliGRAM(s) Oral at bedtime  traZODone 100 milliGRAM(s) Oral at bedtime    MEDICATIONS  (PRN):  dextrose Oral Gel 15 Gram(s) Oral once PRN Blood Glucose LESS THAN 70 milliGRAM(s)/deciliter  haloperidol    Injectable 2 milliGRAM(s) IV Push every 6 hours PRN agitation  
MEDICATIONS  (STANDING):  clopidogrel Tablet 75 milliGRAM(s) Oral daily  dextrose 5%. 1000 milliLiter(s) (50 mL/Hr) IV Continuous <Continuous>  dextrose 5%. 1000 milliLiter(s) (100 mL/Hr) IV Continuous <Continuous>  dextrose 50% Injectable 25 Gram(s) IV Push once  dextrose 50% Injectable 25 Gram(s) IV Push once  dextrose 50% Injectable 12.5 Gram(s) IV Push once  divalproex  milliGRAM(s) Oral two times a day  finasteride 5 milliGRAM(s) Oral daily  glipiZIDE. 5 milliGRAM(s) Oral daily  glucagon  Injectable 1 milliGRAM(s) IntraMuscular once  haloperidol     Tablet 2 milliGRAM(s) Oral at bedtime  hydrALAZINE 25 milliGRAM(s) Oral three times a day  insulin glargine Injectable (LANTUS) 8 Unit(s) SubCutaneous at bedtime  insulin lispro (ADMELOG) corrective regimen sliding scale   SubCutaneous three times a day before meals  isosorbide   mononitrate ER Tablet (IMDUR) 30 milliGRAM(s) Oral daily  metoprolol tartrate 100 milliGRAM(s) Oral daily  sodium bicarbonate 650 milliGRAM(s) Oral three times a day  tamsulosin 0.4 milliGRAM(s) Oral at bedtime  traZODone 100 milliGRAM(s) Oral at bedtime    MEDICATIONS  (PRN):  dextrose Oral Gel 15 Gram(s) Oral once PRN Blood Glucose LESS THAN 70 milliGRAM(s)/deciliter  haloperidol    Injectable 2 milliGRAM(s) IV Push every 6 hours PRN agitation  
MEDICATIONS  (STANDING):  clopidogrel Tablet 75 milliGRAM(s) Oral daily  dextrose 5%. 1000 milliLiter(s) (50 mL/Hr) IV Continuous <Continuous>  dextrose 5%. 1000 milliLiter(s) (100 mL/Hr) IV Continuous <Continuous>  dextrose 50% Injectable 12.5 Gram(s) IV Push once  dextrose 50% Injectable 25 Gram(s) IV Push once  dextrose 50% Injectable 25 Gram(s) IV Push once  divalproex  milliGRAM(s) Oral two times a day  finasteride 5 milliGRAM(s) Oral daily  glipiZIDE. 5 milliGRAM(s) Oral daily  glucagon  Injectable 1 milliGRAM(s) IntraMuscular once  haloperidol     Tablet 2 milliGRAM(s) Oral at bedtime  hydrALAZINE 25 milliGRAM(s) Oral three times a day  insulin glargine Injectable (LANTUS) 4 Unit(s) SubCutaneous at bedtime  insulin lispro (ADMELOG) corrective regimen sliding scale   SubCutaneous three times a day before meals  isosorbide   mononitrate ER Tablet (IMDUR) 30 milliGRAM(s) Oral daily  metoprolol tartrate 100 milliGRAM(s) Oral daily  sodium bicarbonate 1300 milliGRAM(s) Oral three times a day  tamsulosin 0.4 milliGRAM(s) Oral at bedtime  traZODone 100 milliGRAM(s) Oral at bedtime    MEDICATIONS  (PRN):  dextrose Oral Gel 15 Gram(s) Oral once PRN Blood Glucose LESS THAN 70 milliGRAM(s)/deciliter  haloperidol    Injectable 2 milliGRAM(s) IV Push every 6 hours PRN agitation  
MEDICATIONS  (STANDING):  clopidogrel Tablet 75 milliGRAM(s) Oral daily  dextrose 5%. 1000 milliLiter(s) (50 mL/Hr) IV Continuous <Continuous>  dextrose 5%. 1000 milliLiter(s) (100 mL/Hr) IV Continuous <Continuous>  dextrose 50% Injectable 25 Gram(s) IV Push once  dextrose 50% Injectable 25 Gram(s) IV Push once  dextrose 50% Injectable 12.5 Gram(s) IV Push once  divalproex  milliGRAM(s) Oral two times a day  finasteride 5 milliGRAM(s) Oral daily  glipiZIDE. 5 milliGRAM(s) Oral daily  glucagon  Injectable 1 milliGRAM(s) IntraMuscular once  haloperidol     Tablet 2 milliGRAM(s) Oral at bedtime  hydrALAZINE 25 milliGRAM(s) Oral three times a day  insulin glargine Injectable (LANTUS) 8 Unit(s) SubCutaneous at bedtime  insulin lispro (ADMELOG) corrective regimen sliding scale   SubCutaneous three times a day before meals  isosorbide   mononitrate ER Tablet (IMDUR) 30 milliGRAM(s) Oral daily  metoprolol tartrate 100 milliGRAM(s) Oral daily  sodium bicarbonate 650 milliGRAM(s) Oral three times a day  tamsulosin 0.4 milliGRAM(s) Oral at bedtime  traZODone 100 milliGRAM(s) Oral at bedtime    MEDICATIONS  (PRN):  dextrose Oral Gel 15 Gram(s) Oral once PRN Blood Glucose LESS THAN 70 milliGRAM(s)/deciliter  haloperidol    Injectable 2 milliGRAM(s) IV Push every 6 hours PRN agitation

## 2024-01-31 NOTE — BH CONSULTATION LIAISON PROGRESS NOTE - NSBHCHARTREVIEWVS_PSY_A_CORE FT
Vital Signs Last 24 Hrs  T(C): 36.3 (11 Jan 2024 14:00), Max: 36.3 (10 Mikel 2024 20:28)  T(F): 97.3 (11 Jan 2024 14:00), Max: 97.3 (10 Mikel 2024 20:28)  HR: 82 (11 Jan 2024 15:00) (82 - 96)  BP: 124/63 (11 Jan 2024 15:00) (124/63 - 164/72)  BP(mean): --  RR: 18 (11 Jan 2024 14:00) (17 - 18)  SpO2: 100% (11 Jan 2024 14:00) (100% - 100%)    Parameters below as of 11 Jan 2024 14:00  Patient On (Oxygen Delivery Method): room air    
Vital Signs Last 24 Hrs  T(C): 36.2 (19 Jan 2024 12:50), Max: 36.8 (18 Jan 2024 21:30)  T(F): 97.2 (19 Jan 2024 12:50), Max: 98.2 (18 Jan 2024 21:30)  HR: 79 (19 Jan 2024 12:50) (79 - 83)  BP: 176/75 (19 Jan 2024 12:50) (110/71 - 176/75)  BP(mean): --  RR: 18 (19 Jan 2024 12:50) (17 - 18)  SpO2: 100% (19 Jan 2024 12:50) (97% - 100%)    Parameters below as of 19 Jan 2024 12:50  Patient On (Oxygen Delivery Method): room air    
Vital Signs Last 24 Hrs  T(C): 36.6 (18 Jan 2024 15:12), Max: 36.7 (18 Jan 2024 12:14)  T(F): 97.8 (18 Jan 2024 15:12), Max: 98 (18 Jan 2024 12:14)  HR: 73 (18 Jan 2024 15:12) (70 - 78)  BP: 158/77 (18 Jan 2024 15:12) (142/66 - 173/74)  BP(mean): --  RR: 17 (18 Jan 2024 15:12) (17 - 18)  SpO2: 99% (18 Jan 2024 15:12) (99% - 100%)    Parameters below as of 18 Jan 2024 15:12  Patient On (Oxygen Delivery Method): room air    
Vital Signs Last 24 Hrs  T(C): 36.7 (14 Jan 2024 05:05), Max: 36.9 (13 Jan 2024 14:14)  T(F): 98 (14 Jan 2024 05:05), Max: 98.5 (13 Jan 2024 14:14)  HR: 71 (14 Jan 2024 05:05) (71 - 84)  BP: 135/55 (14 Jan 2024 05:05) (135/55 - 155/71)  BP(mean): --  RR: 18 (14 Jan 2024 05:05) (18 - 18)  SpO2: 99% (14 Jan 2024 05:05) (97% - 99%)    Parameters below as of 14 Jan 2024 05:05  Patient On (Oxygen Delivery Method): room air    
Vital Signs Last 24 Hrs  T(C): 36.4 (08 Jan 2024 11:19), Max: 36.8 (08 Jan 2024 06:06)  T(F): 97.5 (08 Jan 2024 11:19), Max: 98.2 (08 Jan 2024 06:06)  HR: 75 (08 Jan 2024 17:22) (75 - 93)  BP: 147/75 (08 Jan 2024 17:22) (147/75 - 168/74)  BP(mean): --  RR: 18 (08 Jan 2024 17:22) (18 - 20)  SpO2: 100% (08 Jan 2024 17:22) (99% - 100%)    Parameters below as of 08 Jan 2024 11:19  Patient On (Oxygen Delivery Method): room air    
Vital Signs Last 24 Hrs  T(C): 36.9 (31 Jan 2024 13:03), Max: 37 (31 Jan 2024 06:00)  T(F): 98.5 (31 Jan 2024 13:03), Max: 98.6 (31 Jan 2024 06:00)  HR: 64 (31 Jan 2024 13:03) (64 - 86)  BP: 166/76 (31 Jan 2024 13:03) (148/76 - 166/76)  BP(mean): --  RR: 18 (31 Jan 2024 16:15) (18 - 18)  SpO2: 95% (31 Jan 2024 16:15) (95% - 98%)    Parameters below as of 31 Jan 2024 16:15  Patient On (Oxygen Delivery Method): room air    
Vital Signs Last 24 Hrs  T(C): 36.6 (16 Jan 2024 13:22), Max: 36.6 (16 Jan 2024 13:22)  T(F): 97.8 (16 Jan 2024 13:22), Max: 97.8 (16 Jan 2024 13:22)  HR: 76 (16 Jan 2024 13:22) (71 - 76)  BP: 149/72 (16 Jan 2024 04:59) (149/72 - 173/95)  BP(mean): --  RR: 18 (16 Jan 2024 13:22) (18 - 18)  SpO2: 100% (16 Jan 2024 13:22) (99% - 100%)    Parameters below as of 16 Jan 2024 13:22  Patient On (Oxygen Delivery Method): room air    
Vital Signs Last 24 Hrs  T(C): 36.4 (29 Jan 2024 12:40), Max: 36.6 (29 Jan 2024 05:12)  T(F): 97.5 (29 Jan 2024 12:40), Max: 97.9 (29 Jan 2024 05:12)  HR: 56 (29 Jan 2024 12:40) (56 - 89)  BP: 162/78 (29 Jan 2024 12:40) (145/73 - 162/78)  BP(mean): --  RR: 18 (29 Jan 2024 12:40) (18 - 18)  SpO2: 99% (29 Jan 2024 12:40) (99% - 100%)    Parameters below as of 29 Jan 2024 12:40  Patient On (Oxygen Delivery Method): room air    
Vital Signs Last 24 Hrs  T(C): 36.6 (21 Jan 2024 04:21), Max: 36.6 (21 Jan 2024 04:21)  T(F): 97.9 (21 Jan 2024 04:21), Max: 97.9 (21 Jan 2024 04:21)  HR: 89 (21 Jan 2024 04:21) (89 - 91)  BP: 160/70 (21 Jan 2024 04:21) (160/70 - 168/79)  BP(mean): --  RR: 18 (21 Jan 2024 04:21) (18 - 18)  SpO2: 100% (21 Jan 2024 04:21) (99% - 100%)    Parameters below as of 21 Jan 2024 04:21  Patient On (Oxygen Delivery Method): room air    
Vital Signs Last 24 Hrs  T(C): 36.6 (15 Mikel 2024 04:25), Max: 36.6 (15 Mikel 2024 04:25)  T(F): 97.8 (15 Mikel 2024 04:25), Max: 97.8 (15 Mikel 2024 04:25)  HR: 84 (15 Mikel 2024 04:25) (66 - 84)  BP: 155/78 (15 Mikel 2024 04:25) (144/67 - 155/78)  BP(mean): --  RR: 18 (15 Mikel 2024 04:25) (18 - 18)  SpO2: 100% (15 Mikel 2024 04:25) (99% - 100%)    Parameters below as of 15 Mikel 2024 04:25  Patient On (Oxygen Delivery Method): room air    
Vital Signs Last 24 Hrs  T(C): 36.7 (13 Jan 2024 04:26), Max: 36.7 (12 Jan 2024 21:40)  T(F): 98 (13 Jan 2024 04:26), Max: 98.1 (12 Jan 2024 21:40)  HR: 80 (13 Jan 2024 04:26) (80 - 85)  BP: 111/67 (13 Jan 2024 04:26) (111/67 - 151/74)  BP(mean): --  RR: 18 (13 Jan 2024 04:26) (18 - 18)  SpO2: 98% (13 Jan 2024 04:26) (98% - 100%)    Parameters below as of 13 Jan 2024 04:26  Patient On (Oxygen Delivery Method): room air    
Vital Signs Last 24 Hrs  T(C): 36.3 (23 Jan 2024 04:26), Max: 36.4 (22 Jan 2024 14:09)  T(F): 97.3 (23 Jan 2024 04:26), Max: 97.6 (22 Jan 2024 14:09)  HR: 67 (23 Jan 2024 04:26) (67 - 85)  BP: 152/73 (23 Jan 2024 04:26) (147/74 - 164/63)  BP(mean): --  RR: 18 (23 Jan 2024 04:26) (18 - 18)  SpO2: 100% (23 Jan 2024 04:26) (99% - 100%)    Parameters below as of 23 Jan 2024 04:26  Patient On (Oxygen Delivery Method): room air    
Vital Signs Last 24 Hrs  T(C): 36.4 (22 Jan 2024 14:09), Max: 36.6 (22 Jan 2024 04:14)  T(F): 97.6 (22 Jan 2024 14:09), Max: 97.9 (22 Jan 2024 04:14)  HR: 84 (22 Jan 2024 14:09) (83 - 89)  BP: 147/74 (22 Jan 2024 14:09) (136/65 - 160/72)  BP(mean): --  RR: 18 (22 Jan 2024 14:09) (18 - 18)  SpO2: 99% (22 Jan 2024 14:09) (99% - 100%)    Parameters below as of 22 Jan 2024 14:09  Patient On (Oxygen Delivery Method): room air    
Vital Signs Last 24 Hrs  T(C): 36.6 (17 Jan 2024 11:19), Max: 36.6 (17 Jan 2024 11:19)  T(F): 97.8 (17 Jan 2024 11:19), Max: 97.8 (17 Jan 2024 11:19)  HR: 70 (17 Jan 2024 16:17) (67 - 73)  BP: 142/66 (17 Jan 2024 16:17) (141/64 - 154/69)  BP(mean): --  RR: 18 (17 Jan 2024 11:19) (18 - 18)  SpO2: 100% (17 Jan 2024 11:19) (100% - 100%)    Parameters below as of 17 Jan 2024 11:19  Patient On (Oxygen Delivery Method): room air    
Vital Signs Last 24 Hrs  T(C): 36.4 (10 Mikel 2024 12:28), Max: 36.8 (09 Jan 2024 19:10)  T(F): 97.5 (10 Mikel 2024 12:28), Max: 98.3 (09 Jan 2024 19:10)  HR: 83 (10 Mikel 2024 12:28) (76 - 90)  BP: 135/72 (10 Mikel 2024 12:28) (120/66 - 155/75)  BP(mean): --  RR: 18 (10 Mikel 2024 12:28) (18 - 18)  SpO2: 100% (10 Mikel 2024 12:28) (98% - 100%)    Parameters below as of 10 Mikel 2024 12:28  Patient On (Oxygen Delivery Method): room air    
Vital Signs Last 24 Hrs  T(C): 36.7 (20 Jan 2024 04:13), Max: 36.7 (20 Jan 2024 04:13)  T(F): 98 (20 Jan 2024 04:13), Max: 98 (20 Jan 2024 04:13)  HR: 86 (20 Jan 2024 04:13) (85 - 86)  BP: 158/75 (20 Jan 2024 04:13) (131/63 - 158/75)  BP(mean): --  RR: 18 (20 Jan 2024 04:13) (18 - 18)  SpO2: 100% (20 Jan 2024 04:13) (100% - 100%)    Parameters below as of 20 Jan 2024 04:13  Patient On (Oxygen Delivery Method): room air    
Vital Signs Last 24 Hrs  T(C): 37 (09 Jan 2024 14:52), Max: 37 (09 Jan 2024 14:52)  T(F): 98.6 (09 Jan 2024 14:52), Max: 98.6 (09 Jan 2024 14:52)  HR: 65 (09 Jan 2024 14:52) (65 - 85)  BP: 144/69 (09 Jan 2024 14:52) (140/79 - 167/74)  BP(mean): --  RR: 18 (09 Jan 2024 14:52) (18 - 18)  SpO2: 99% (09 Jan 2024 14:52) (99% - 100%)    Parameters below as of 09 Jan 2024 14:52  Patient On (Oxygen Delivery Method): room air    
Vital Signs Last 24 Hrs  T(C): 36.9 (12 Jan 2024 04:03), Max: 36.9 (12 Jan 2024 04:03)  T(F): 98.4 (12 Jan 2024 04:03), Max: 98.4 (12 Jan 2024 04:03)  HR: 86 (12 Jan 2024 04:03) (82 - 86)  BP: 154/64 (12 Jan 2024 04:03) (124/63 - 154/64)  BP(mean): --  RR: 18 (12 Jan 2024 04:03) (18 - 18)  SpO2: 100% (12 Jan 2024 04:03) (98% - 100%)    Parameters below as of 12 Jan 2024 04:03  Patient On (Oxygen Delivery Method): room air

## 2024-01-31 NOTE — BH CONSULTATION LIAISON PROGRESS NOTE - NSBHCONSULTMEDPRNREASON_PSY_A_CORE
severe agitation...
no

## 2024-01-31 NOTE — BH CONSULTATION LIAISON PROGRESS NOTE - NSBHMSEPERCEPT_PSY_A_CORE
No abnormalities
Unable to assess
Unable to assess
No abnormalities
Unable to assess
No abnormalities
Unable to assess
Unable to assess
No abnormalities
Unable to assess

## 2024-01-31 NOTE — BH CONSULTATION LIAISON PROGRESS NOTE - NSBHMSETHTCONTENT_PSY_A_CORE
Unable to assess
Unable to assess
Unremarkable
Unable to assess
Unremarkable
Unable to assess
Unable to assess
Unremarkable
Unable to assess

## 2024-01-31 NOTE — BH CONSULTATION LIAISON PROGRESS NOTE - NSBHMSEAFFQUAL_PSY_A_CORE
Irritable
Unable to assess
Irritable
Unable to assess
Unable to assess
Irritable

## 2024-05-27 RX ORDER — IPRATROPIUM/ALBUTEROL SULFATE 18-103MCG
3 AEROSOL WITH ADAPTER (GRAM) INHALATION
Refills: 0 | DISCHARGE

## 2024-05-27 RX ORDER — ISOSORBIDE MONONITRATE 60 MG/1
1 TABLET, EXTENDED RELEASE ORAL
Refills: 0 | DISCHARGE

## 2024-05-27 RX ORDER — SENNA PLUS 8.6 MG/1
1 TABLET ORAL
Refills: 0 | DISCHARGE

## 2024-05-27 RX ORDER — METOPROLOL TARTRATE 50 MG
1 TABLET ORAL
Refills: 0 | DISCHARGE

## 2024-05-27 RX ORDER — INSULIN GLARGINE 100 [IU]/ML
10 INJECTION, SOLUTION SUBCUTANEOUS
Refills: 0 | DISCHARGE

## 2024-05-27 RX ORDER — TAMSULOSIN HYDROCHLORIDE 0.4 MG/1
1 CAPSULE ORAL
Refills: 0 | DISCHARGE

## 2024-05-27 RX ORDER — DIVALPROEX SODIUM 500 MG/1
1 TABLET, DELAYED RELEASE ORAL
Refills: 0 | DISCHARGE

## 2024-05-27 RX ORDER — HYDRALAZINE HCL 50 MG
1 TABLET ORAL
Refills: 0 | DISCHARGE

## 2024-05-27 RX ORDER — TRAZODONE HCL 50 MG
1 TABLET ORAL
Refills: 0 | DISCHARGE

## 2024-05-27 RX ORDER — SOD,AMMONIUM,POTASSIUM LACTATE
1 CREAM (GRAM) TOPICAL
Refills: 0 | DISCHARGE

## 2024-05-27 RX ORDER — FINASTERIDE 5 MG/1
1 TABLET, FILM COATED ORAL
Refills: 0 | DISCHARGE

## 2024-05-27 RX ORDER — CLOPIDOGREL BISULFATE 75 MG/1
1 TABLET, FILM COATED ORAL
Refills: 0 | DISCHARGE

## 2024-05-27 RX ORDER — SENNOSIDES/DOCUSATE SODIUM 8.6MG-50MG
1 TABLET ORAL
Refills: 0 | DISCHARGE

## 2024-05-27 RX ORDER — ACETAMINOPHEN 500 MG
2 TABLET ORAL
Refills: 0 | DISCHARGE

## 2024-05-27 RX ORDER — SODIUM BICARBONATE 1 MEQ/ML
1 SYRINGE (ML) INTRAVENOUS
Refills: 0 | DISCHARGE

## 2024-05-27 RX ORDER — FERROUS SULFATE 325(65) MG
1 TABLET ORAL
Refills: 0 | DISCHARGE

## 2024-05-27 RX ORDER — LANOLIN ALCOHOL/MO/W.PET/CERES
1 CREAM (GRAM) TOPICAL
Refills: 0 | DISCHARGE

## 2025-03-31 NOTE — BH CONSULTATION LIAISON PROGRESS NOTE - NSBHCHARTREVIEWINVESTIGATE_PSY_A_CORE FT
Procedure rescheduled to 4/18/25 with Dr. Gomez at the [facility]   < from: 12 Lead ECG (01.04.24 @ 23:51) >      Ventricular Rate 69 BPM    Atrial Rate 69 BPM    P-R Interval 160 ms    QRS Duration 76 ms    Q-T Interval 398 ms    QTC Calculation(Bazett) 426 ms    P Axis 63 degrees    R Axis 72 degrees    T Axis 52 degrees    Diagnosis Line BASELINE ARTIFACT  NORMAL SINUS RHYTHM  NORMAL ECG  WHEN COMPARED WITH ECG OF 04-JAN-2024 12:45, (UNCONFIRMED)  NO SIGNIFICANT CHANGE WAS FOUND  Confirmed by MD Minh, Mukesh ([id number]) on 1/5/2024 7:37:24 PM    < end of copied text > < from: 12 Lead ECG (01.04.24 @ 23:51) >      Ventricular Rate 69 BPM    Atrial Rate 69 BPM    P-R Interval 160 ms    QRS Duration 76 ms    Q-T Interval 398 ms    QTC Calculation(Bazett) 426 ms    P Axis 63 degrees    R Axis 72 degrees    T Axis 52 degrees    Diagnosis Line BASELINE ARTIFACT  NORMAL SINUS RHYTHM  NORMAL ECG  WHEN COMPARED WITH ECG OF 04-JAN-2024 12:45, (UNCONFIRMED)  NO SIGNIFICANT CHANGE WAS FOUND  Confirmed by MD Minh, Mukesh ([id number]) on 1/5/2024 7:37:24 PM    < end of copied text >